# Patient Record
Sex: FEMALE | Race: ASIAN | Employment: FULL TIME | URBAN - METROPOLITAN AREA
[De-identification: names, ages, dates, MRNs, and addresses within clinical notes are randomized per-mention and may not be internally consistent; named-entity substitution may affect disease eponyms.]

---

## 2020-10-13 ENCOUNTER — OFFICE VISIT (OUTPATIENT)
Dept: OBGYN CLINIC | Facility: CLINIC | Age: 35
End: 2020-10-13
Payer: COMMERCIAL

## 2020-10-13 VITALS — WEIGHT: 160 LBS | SYSTOLIC BLOOD PRESSURE: 115 MMHG | DIASTOLIC BLOOD PRESSURE: 82 MMHG | HEART RATE: 90 BPM

## 2020-10-13 DIAGNOSIS — Z32.01 PREGNANCY EXAMINATION OR TEST, POSITIVE RESULT: Primary | ICD-10-CM

## 2020-10-13 DIAGNOSIS — R39.9 UTI SYMPTOMS: ICD-10-CM

## 2020-10-13 DIAGNOSIS — N92.6 MISSED MENSES: ICD-10-CM

## 2020-10-13 PROBLEM — Z86.59 HISTORY OF ANOREXIA NERVOSA: Status: ACTIVE | Noted: 2020-10-13

## 2020-10-13 PROCEDURE — 81025 URINE PREGNANCY TEST: CPT | Performed by: OBSTETRICS & GYNECOLOGY

## 2020-10-13 PROCEDURE — 99202 OFFICE O/P NEW SF 15 MIN: CPT | Performed by: OBSTETRICS & GYNECOLOGY

## 2020-10-13 PROCEDURE — 3074F SYST BP LT 130 MM HG: CPT | Performed by: OBSTETRICS & GYNECOLOGY

## 2020-10-13 PROCEDURE — 3079F DIAST BP 80-89 MM HG: CPT | Performed by: OBSTETRICS & GYNECOLOGY

## 2020-10-13 RX ORDER — NITROFURANTOIN 25; 75 MG/1; MG/1
100 CAPSULE ORAL 2 TIMES DAILY
Qty: 14 CAPSULE | Refills: 0 | Status: SHIPPED | OUTPATIENT
Start: 2020-10-13 | End: 2020-10-20

## 2020-10-13 NOTE — PROGRESS NOTES
[facility], [facility]  Obstetrics and Gynecology  Pregnancy Confirmation  MD CHIRAG Linda     Sukumar Pandey is a 29year old  with Patient's last menstrual period was 2020 (exact date). who presents for pregnancy confirmation.      [employer] Financial resource strain: Not on file      Food insecurity        Worry: Not on file        Inability: Not on file      Transportation needs        Medical: Not on file        Non-medical: Not on file    Tobacco Use      Smoking status: Never Smoker which will be done at RN visit. Pt counseled on AMA and the option of screening versus diagnostic tests. We will discussed this in depth after her ultrasound confirms viability.   Patient counseled on options of empiric treatment for UTI and waiting on re

## 2020-10-20 ENCOUNTER — OFFICE VISIT (OUTPATIENT)
Dept: OBGYN CLINIC | Facility: CLINIC | Age: 35
End: 2020-10-20
Payer: COMMERCIAL

## 2020-10-20 VITALS — HEART RATE: 111 BPM | SYSTOLIC BLOOD PRESSURE: 136 MMHG | DIASTOLIC BLOOD PRESSURE: 86 MMHG

## 2020-10-20 DIAGNOSIS — Z36.87 UNSURE OF LMP (LAST MENSTRUAL PERIOD) AS REASON FOR ULTRASOUND SCAN: Primary | ICD-10-CM

## 2020-10-20 PROCEDURE — 3079F DIAST BP 80-89 MM HG: CPT | Performed by: OBSTETRICS & GYNECOLOGY

## 2020-10-20 PROCEDURE — 99213 OFFICE O/P EST LOW 20 MIN: CPT | Performed by: OBSTETRICS & GYNECOLOGY

## 2020-10-20 PROCEDURE — 76817 TRANSVAGINAL US OBSTETRIC: CPT | Performed by: OBSTETRICS & GYNECOLOGY

## 2020-10-20 PROCEDURE — 3075F SYST BP GE 130 - 139MM HG: CPT | Performed by: OBSTETRICS & GYNECOLOGY

## 2020-10-29 ENCOUNTER — APPOINTMENT (OUTPATIENT)
Dept: GENERAL RADIOLOGY | Age: 35
End: 2020-10-29
Attending: EMERGENCY MEDICINE

## 2020-10-29 ENCOUNTER — HOSPITAL ENCOUNTER (OUTPATIENT)
Age: 35
Setting detail: OBSERVATION
Discharge: HOME | End: 2020-10-30
Attending: EMERGENCY MEDICINE | Admitting: INTERNAL MEDICINE

## 2020-10-29 DIAGNOSIS — R07.9 CHEST PAIN, UNSPECIFIED TYPE: Primary | ICD-10-CM

## 2020-10-29 LAB
ALBUMIN SERPL-MCNC: 3.5 G/DL (ref 3.6–5.1)
ALBUMIN/GLOB SERPL: 0.9 {RATIO} (ref 1–2.4)
ALP SERPL-CCNC: 50 UNITS/L (ref 45–117)
ALT SERPL-CCNC: 24 UNITS/L
ANION GAP SERPL CALC-SCNC: 10 MMOL/L (ref 10–20)
AST SERPL-CCNC: 23 UNITS/L
BASOPHILS # BLD: 0.1 K/MCL (ref 0–0.3)
BASOPHILS NFR BLD: 1 %
BILIRUB SERPL-MCNC: 0.3 MG/DL (ref 0.2–1)
BUN SERPL-MCNC: 14 MG/DL (ref 6–20)
BUN/CREAT SERPL: 23 (ref 7–25)
CALCIUM SERPL-MCNC: 9.2 MG/DL (ref 8.4–10.2)
CHLORIDE SERPL-SCNC: 104 MMOL/L (ref 98–107)
CO2 SERPL-SCNC: 26 MMOL/L (ref 21–32)
CREAT SERPL-MCNC: 0.6 MG/DL (ref 0.51–0.95)
DIFFERENTIAL METHOD BLD: NORMAL
EOSINOPHIL # BLD: 0.2 K/MCL (ref 0.1–0.5)
EOSINOPHIL NFR BLD: 2 %
ERYTHROCYTE [DISTWIDTH] IN BLOOD: 13.1 % (ref 11–15)
GLOBULIN SER-MCNC: 4.1 G/DL (ref 2–4)
GLUCOSE SERPL-MCNC: 108 MG/DL (ref 65–99)
HCG SERPL-ACNC: ABNORMAL MUNITS/ML
HCT VFR BLD CALC: 38.1 % (ref 36–46.5)
HGB BLD-MCNC: 12.5 G/DL (ref 12–15.5)
IMM GRANULOCYTES # BLD AUTO: 0 K/MCL (ref 0–0.2)
IMM GRANULOCYTES NFR BLD: 0 %
LYMPHOCYTES # BLD: 3.3 K/MCL (ref 1–4.8)
LYMPHOCYTES NFR BLD: 31 %
MCH RBC QN AUTO: 29.6 PG (ref 26–34)
MCHC RBC AUTO-ENTMCNC: 32.8 G/DL (ref 32–36.5)
MCV RBC AUTO: 90.3 FL (ref 78–100)
MONOCYTES # BLD: 0.6 K/MCL (ref 0.3–0.9)
MONOCYTES NFR BLD: 6 %
NEUTROPHILS # BLD: 6.6 K/MCL (ref 1.8–7.7)
NEUTROPHILS NFR BLD: 60 %
NRBC BLD MANUAL-RTO: 0 /100 WBC
PLATELET # BLD: 311 K/MCL (ref 140–450)
POTASSIUM SERPL-SCNC: 4 MMOL/L (ref 3.4–5.1)
PROT SERPL-MCNC: 7.6 G/DL (ref 6.4–8.2)
RBC # BLD: 4.22 MIL/MCL (ref 4–5.2)
SODIUM SERPL-SCNC: 136 MMOL/L (ref 135–145)
TROPONIN I SERPL HS-MCNC: <0.02 NG/ML
WBC # BLD: 10.8 K/MCL (ref 4.2–11)

## 2020-10-29 PROCEDURE — 84484 ASSAY OF TROPONIN QUANT: CPT

## 2020-10-29 PROCEDURE — 10004651 HB RX, NO CHARGE ITEM: Performed by: INTERNAL MEDICINE

## 2020-10-29 PROCEDURE — 84702 CHORIONIC GONADOTROPIN TEST: CPT

## 2020-10-29 PROCEDURE — 93005 ELECTROCARDIOGRAM TRACING: CPT | Performed by: EMERGENCY MEDICINE

## 2020-10-29 PROCEDURE — 85379 FIBRIN DEGRADATION QUANT: CPT

## 2020-10-29 PROCEDURE — 99285 EMERGENCY DEPT VISIT HI MDM: CPT

## 2020-10-29 PROCEDURE — 99285 EMERGENCY DEPT VISIT HI MDM: CPT | Performed by: EMERGENCY MEDICINE

## 2020-10-29 PROCEDURE — 96374 THER/PROPH/DIAG INJ IV PUSH: CPT

## 2020-10-29 PROCEDURE — 80053 COMPREHEN METABOLIC PANEL: CPT

## 2020-10-29 PROCEDURE — 85025 COMPLETE CBC W/AUTO DIFF WBC: CPT

## 2020-10-29 PROCEDURE — 71045 X-RAY EXAM CHEST 1 VIEW: CPT

## 2020-10-29 RX ORDER — ACETAMINOPHEN 325 MG/1
650 TABLET ORAL ONCE
Status: COMPLETED | OUTPATIENT
Start: 2020-10-29 | End: 2020-10-29

## 2020-10-29 RX ADMIN — ACETAMINOPHEN 650 MG: 325 TABLET, FILM COATED ORAL at 23:12

## 2020-10-29 ASSESSMENT — PAIN SCALES - GENERAL: PAINLEVEL_OUTOF10: 8

## 2020-10-30 ENCOUNTER — APPOINTMENT (OUTPATIENT)
Dept: CARDIOLOGY | Age: 35
End: 2020-10-30
Attending: INTERNAL MEDICINE

## 2020-10-30 ENCOUNTER — APPOINTMENT (OUTPATIENT)
Dept: CT IMAGING | Age: 35
End: 2020-10-30
Attending: INTERNAL MEDICINE

## 2020-10-30 VITALS
WEIGHT: 165.12 LBS | HEIGHT: 63 IN | BODY MASS INDEX: 29.26 KG/M2 | HEART RATE: 72 BPM | OXYGEN SATURATION: 96 % | DIASTOLIC BLOOD PRESSURE: 61 MMHG | RESPIRATION RATE: 16 BRPM | SYSTOLIC BLOOD PRESSURE: 99 MMHG | TEMPERATURE: 98.2 F

## 2020-10-30 PROBLEM — R07.89 MUSCULOSKELETAL CHEST PAIN: Status: ACTIVE | Noted: 2020-10-30

## 2020-10-30 PROBLEM — Z3A.08 8 WEEKS GESTATION OF PREGNANCY: Status: ACTIVE | Noted: 2020-10-30

## 2020-10-30 PROBLEM — R79.89 ELEVATED D-DIMER: Status: ACTIVE | Noted: 2020-10-30

## 2020-10-30 LAB
ATRIAL RATE (BPM): 63
ATRIAL RATE (BPM): 69
ATRIAL RATE (BPM): 99
CHOLEST SERPL-MCNC: 198 MG/DL
CHOLEST/HDLC SERPL: 3 {RATIO}
CRP SERPL-MCNC: 0.4 MG/DL
D DIMER PPP FEU-MCNC: 0.99 MG/L (FEU)
ERYTHROCYTE [SEDIMENTATION RATE] IN BLOOD BY WESTERGREN METHOD: 8 MM/HR (ref 0–20)
HDLC SERPL-MCNC: 66 MG/DL
LDLC SERPL CALC-MCNC: 104 MG/DL
NONHDLC SERPL-MCNC: 132 MG/DL
P AXIS (DEGREES): 14
P AXIS (DEGREES): 46
P AXIS (DEGREES): 49
PR-INTERVAL (MSEC): 161
PR-INTERVAL (MSEC): 165
PR-INTERVAL (MSEC): 171
QRS-INTERVAL (MSEC): 75
QRS-INTERVAL (MSEC): 79
QRS-INTERVAL (MSEC): 83
QT-INTERVAL (MSEC): 328
QT-INTERVAL (MSEC): 395
QT-INTERVAL (MSEC): 395
QTC: 408
QTC: 419
QTC: 423
R AXIS (DEGREES): 14
R AXIS (DEGREES): 27
R AXIS (DEGREES): 44
REPORT TEXT: NORMAL
SARS-COV-2 RNA RESP QL NAA+PROBE: NOT DETECTED
SERVICE CMNT-IMP: NORMAL
SPECIMEN SOURCE: NORMAL
T AXIS (DEGREES): 18
T AXIS (DEGREES): 18
T AXIS (DEGREES): 47
TRIGL SERPL-MCNC: 140 MG/DL
TROPONIN I SERPL HS-MCNC: <0.02 NG/ML
VENTRICULAR RATE EKG/MIN (BPM): 64
VENTRICULAR RATE EKG/MIN (BPM): 69
VENTRICULAR RATE EKG/MIN (BPM): 98

## 2020-10-30 PROCEDURE — 71275 CT ANGIOGRAPHY CHEST: CPT

## 2020-10-30 PROCEDURE — G0378 HOSPITAL OBSERVATION PER HR: HCPCS

## 2020-10-30 PROCEDURE — 10004651 HB RX, NO CHARGE ITEM: Performed by: NURSE PRACTITIONER

## 2020-10-30 PROCEDURE — 93306 TTE W/DOPPLER COMPLETE: CPT

## 2020-10-30 PROCEDURE — 86140 C-REACTIVE PROTEIN: CPT

## 2020-10-30 PROCEDURE — 84484 ASSAY OF TROPONIN QUANT: CPT

## 2020-10-30 PROCEDURE — 36415 COLL VENOUS BLD VENIPUNCTURE: CPT

## 2020-10-30 PROCEDURE — 87635 SARS-COV-2 COVID-19 AMP PRB: CPT

## 2020-10-30 PROCEDURE — 10002800 HB RX 250 W HCPCS: Performed by: INTERNAL MEDICINE

## 2020-10-30 PROCEDURE — 10002807 HB RX 258: Performed by: INTERNAL MEDICINE

## 2020-10-30 PROCEDURE — 93005 ELECTROCARDIOGRAM TRACING: CPT | Performed by: NURSE PRACTITIONER

## 2020-10-30 PROCEDURE — 93005 ELECTROCARDIOGRAM TRACING: CPT | Performed by: INTERNAL MEDICINE

## 2020-10-30 PROCEDURE — 85652 RBC SED RATE AUTOMATED: CPT

## 2020-10-30 PROCEDURE — 96361 HYDRATE IV INFUSION ADD-ON: CPT

## 2020-10-30 PROCEDURE — 10004651 HB RX, NO CHARGE ITEM: Performed by: INTERNAL MEDICINE

## 2020-10-30 PROCEDURE — 10002805 HB CONTRAST AGENT: Performed by: INTERNAL MEDICINE

## 2020-10-30 PROCEDURE — 10002800 HB RX 250 W HCPCS: Performed by: NURSE PRACTITIONER

## 2020-10-30 PROCEDURE — 96376 TX/PRO/DX INJ SAME DRUG ADON: CPT

## 2020-10-30 PROCEDURE — U0003 INFECTIOUS AGENT DETECTION BY NUCLEIC ACID (DNA OR RNA); SEVERE ACUTE RESPIRATORY SYNDROME CORONAVIRUS 2 (SARS-COV-2) (CORONAVIRUS DISEASE [COVID-19]), AMPLIFIED PROBE TECHNIQUE, MAKING USE OF HIGH THROUGHPUT TECHNOLOGIES AS DESCRIBED BY CMS-2020-01-R: HCPCS

## 2020-10-30 PROCEDURE — 80061 LIPID PANEL: CPT

## 2020-10-30 PROCEDURE — 99244 OFF/OP CNSLTJ NEW/EST MOD 40: CPT | Performed by: INTERNAL MEDICINE

## 2020-10-30 PROCEDURE — 10002803 HB RX 637: Performed by: INTERNAL MEDICINE

## 2020-10-30 RX ORDER — SIMETHICONE 125 MG
125 TABLET,CHEWABLE ORAL 4 TIMES DAILY PRN
Status: DISCONTINUED | OUTPATIENT
Start: 2020-10-30 | End: 2020-10-30 | Stop reason: HOSPADM

## 2020-10-30 RX ORDER — ACETAMINOPHEN 325 MG/1
650 TABLET ORAL EVERY 4 HOURS PRN
Qty: 24 TABLET | Refills: 0 | Status: SHIPPED | COMMUNITY
Start: 2020-10-30

## 2020-10-30 RX ORDER — ONDANSETRON 2 MG/ML
4 INJECTION INTRAMUSCULAR; INTRAVENOUS EVERY 6 HOURS PRN
Status: DISCONTINUED | OUTPATIENT
Start: 2020-10-30 | End: 2020-10-30 | Stop reason: HOSPADM

## 2020-10-30 RX ORDER — LIDOCAINE 4 G/G
1 PATCH TOPICAL ONCE
Status: DISCONTINUED | OUTPATIENT
Start: 2020-10-30 | End: 2020-10-30 | Stop reason: HOSPADM

## 2020-10-30 RX ORDER — ACETAMINOPHEN 325 MG/1
975 TABLET ORAL ONCE
Status: COMPLETED | OUTPATIENT
Start: 2020-10-30 | End: 2020-10-30

## 2020-10-30 RX ORDER — ACETAMINOPHEN 325 MG/1
650 TABLET ORAL EVERY 4 HOURS PRN
Status: DISCONTINUED | OUTPATIENT
Start: 2020-10-30 | End: 2020-10-30 | Stop reason: HOSPADM

## 2020-10-30 RX ORDER — LIDOCAINE 4 G/G
1 PATCH TOPICAL ONCE
Status: COMPLETED | OUTPATIENT
Start: 2020-10-30 | End: 2020-10-30

## 2020-10-30 RX ORDER — VITAMIN A, VITAMIN C, VITAMIN D-3, VITAMIN E, VITAMIN B-1, VITAMIN B-2, NIACIN, VITAMIN B-6, CALCIUM, IRON, ZINC, COPPER 4000; 120; 400; 22; 1.84; 3; 20; 10; 1; 12; 200; 27; 25; 2 [IU]/1; MG/1; [IU]/1; MG/1; MG/1; MG/1; MG/1; MG/1; MG/1; UG/1; MG/1; MG/1; MG/1; MG/1
1 TABLET ORAL DAILY
Status: DISCONTINUED | OUTPATIENT
Start: 2020-10-30 | End: 2020-10-30 | Stop reason: HOSPADM

## 2020-10-30 RX ORDER — 0.9 % SODIUM CHLORIDE 0.9 %
2 VIAL (ML) INJECTION EVERY 12 HOURS SCHEDULED
Status: DISCONTINUED | OUTPATIENT
Start: 2020-10-30 | End: 2020-10-30 | Stop reason: HOSPADM

## 2020-10-30 RX ORDER — VITAMIN A ACETATE, BETA CAROTENE, ASCORBIC ACID, CHOLECALCIFEROL, .ALPHA.-TOCOPHEROL ACETATE, DL-, THIAMINE MONONITRATE, RIBOFLAVIN, NIACINAMIDE, PYRIDOXINE HYDROCHLORIDE, FOLIC ACID, CYANOCOBALAMIN, CALCIUM CARBONATE, FERROUS FUMARATE, ZINC OXIDE, CUPRIC OXIDE 3080; 12; 120; 400; 1; 1.84; 3; 20; 22; 920; 25; 200; 27; 10; 2 [IU]/1; UG/1; MG/1; [IU]/1; MG/1; MG/1; MG/1; MG/1; MG/1; [IU]/1; MG/1; MG/1; MG/1; MG/1; MG/1
1 TABLET, FILM COATED ORAL DAILY
COMMUNITY

## 2020-10-30 RX ADMIN — IOHEXOL 80 ML: 350 INJECTION, SOLUTION INTRAVENOUS at 01:30

## 2020-10-30 RX ADMIN — ACETAMINOPHEN 975 MG: 325 TABLET, FILM COATED ORAL at 01:14

## 2020-10-30 RX ADMIN — ACETAMINOPHEN 650 MG: 325 TABLET, FILM COATED ORAL at 16:32

## 2020-10-30 RX ADMIN — SODIUM CHLORIDE, PRESERVATIVE FREE 2 ML: 5 INJECTION INTRAVENOUS at 09:07

## 2020-10-30 RX ADMIN — MORPHINE SULFATE 2 MG: 2 INJECTION, SOLUTION INTRAMUSCULAR; INTRAVENOUS at 01:56

## 2020-10-30 RX ADMIN — ACETAMINOPHEN 650 MG: 325 TABLET, FILM COATED ORAL at 06:41

## 2020-10-30 RX ADMIN — MORPHINE SULFATE 2 MG: 2 INJECTION, SOLUTION INTRAMUSCULAR; INTRAVENOUS at 09:13

## 2020-10-30 RX ADMIN — SODIUM CHLORIDE 1000 ML: 0.9 INJECTION, SOLUTION INTRAVENOUS at 10:31

## 2020-10-30 RX ADMIN — LIDOCAINE 1 PATCH: 560 PATCH PERCUTANEOUS; TOPICAL; TRANSDERMAL at 01:14

## 2020-10-30 ASSESSMENT — COLUMBIA-SUICIDE SEVERITY RATING SCALE - C-SSRS
2. HAVE YOU ACTUALLY HAD ANY THOUGHTS OF KILLING YOURSELF?: NO
IS THE PATIENT ABLE TO COMPLETE C-SSRS: YES
6. HAVE YOU EVER DONE ANYTHING, STARTED TO DO ANYTHING, OR PREPARED TO DO ANYTHING TO END YOUR LIFE?: NO
1. WITHIN THE PAST MONTH, HAVE YOU WISHED YOU WERE DEAD OR WISHED YOU COULD GO TO SLEEP AND NOT WAKE UP?: NO

## 2020-10-30 ASSESSMENT — PATIENT HEALTH QUESTIONNAIRE - PHQ9
SUM OF ALL RESPONSES TO PHQ9 QUESTIONS 1 AND 2: 0
IS PATIENT ABLE TO COMPLETE PHQ2 OR PHQ9: YES
SUM OF ALL RESPONSES TO PHQ9 QUESTIONS 1 AND 2: 0
2. FEELING DOWN, DEPRESSED OR HOPELESS: NOT AT ALL
1. LITTLE INTEREST OR PLEASURE IN DOING THINGS: NOT AT ALL
CLINICAL INTERPRETATION OF PHQ2 SCORE: NO FURTHER SCREENING NEEDED
CLINICAL INTERPRETATION OF PHQ9 SCORE: NO FURTHER SCREENING NEEDED

## 2020-10-30 ASSESSMENT — ENCOUNTER SYMPTOMS
BACK PAIN: 0
ENDOCRINE NEGATIVE: 1
NAUSEA: 0
WEAKNESS: 0
LIGHT-HEADEDNESS: 0
NUMBNESS: 0
ALLERGIC/IMMUNOLOGIC NEGATIVE: 1
FATIGUE: 0
CONSTIPATION: 0
HEMATOLOGIC/LYMPHATIC NEGATIVE: 1
CONSTITUTIONAL NEGATIVE: 1
HEADACHES: 0
EYE PAIN: 0
EYES NEGATIVE: 1
NEAR-SYNCOPE: 0
FEVER: 0
SHORTNESS OF BREATH: 0
ORTHOPNEA: 0
NEUROLOGICAL NEGATIVE: 1
SYNCOPE: 0
RESPIRATORY NEGATIVE: 1
RHINORRHEA: 0
CHILLS: 0
PSYCHIATRIC NEGATIVE: 1
COUGH: 0
VOMITING: 0
DIZZINESS: 0
DIARRHEA: 0
ABDOMINAL PAIN: 0
SORE THROAT: 0
GASTROINTESTINAL NEGATIVE: 1

## 2020-10-30 ASSESSMENT — ACTIVITIES OF DAILY LIVING (ADL)
CHRONIC_PAIN_PRESENT: NO
ADL_SHORT_OF_BREATH: YES
ADL_SCORE: 12
ADL_BEFORE_ADMISSION: INDEPENDENT
RECENT_DECLINE_ADL: NO

## 2020-10-30 ASSESSMENT — LIFESTYLE VARIABLES
AUDIT-C TOTAL SCORE: 0
HOW MANY STANDARD DRINKS CONTAINING ALCOHOL DO YOU HAVE ON A TYPICAL DAY: 0,1 OR 2
HOW OFTEN DO YOU HAVE A DRINK CONTAINING ALCOHOL: NEVER
HOW OFTEN DO YOU HAVE 6 OR MORE DRINKS ON ONE OCCASION: NEVER

## 2020-10-30 ASSESSMENT — PAIN SCALES - GENERAL
PAINLEVEL_OUTOF10: 6
PAINLEVEL_OUTOF10: 4
PAINLEVEL_OUTOF10: 8
PAINLEVEL_OUTOF10: 8
PAINLEVEL_OUTOF10: 9
PAINLEVEL_OUTOF10: 3
PAINLEVEL_OUTOF10: 4

## 2020-10-30 ASSESSMENT — COGNITIVE AND FUNCTIONAL STATUS - GENERAL
ARE YOU DEAF OR DO YOU HAVE SERIOUS DIFFICULTY  HEARING: NO
ARE YOU BLIND OR DO YOU HAVE SERIOUS DIFFICULTY SEEING, EVEN WHEN WEARING GLASSES: NO

## 2020-10-30 ASSESSMENT — PAIN DESCRIPTION - PAIN TYPE: TYPE: CHEST PAIN

## 2020-11-03 ENCOUNTER — OFFICE VISIT (OUTPATIENT)
Dept: OBGYN CLINIC | Facility: CLINIC | Age: 35
End: 2020-11-03
Payer: COMMERCIAL

## 2020-11-03 VITALS — DIASTOLIC BLOOD PRESSURE: 74 MMHG | SYSTOLIC BLOOD PRESSURE: 118 MMHG | WEIGHT: 164 LBS

## 2020-11-03 DIAGNOSIS — Z36.87 UNSURE OF LMP (LAST MENSTRUAL PERIOD) AS REASON FOR ULTRASOUND SCAN: Primary | ICD-10-CM

## 2020-11-03 DIAGNOSIS — F32.A DEPRESSION, UNSPECIFIED DEPRESSION TYPE: ICD-10-CM

## 2020-11-03 PROCEDURE — 99072 ADDL SUPL MATRL&STAF TM PHE: CPT | Performed by: OBSTETRICS & GYNECOLOGY

## 2020-11-03 PROCEDURE — 76817 TRANSVAGINAL US OBSTETRIC: CPT | Performed by: OBSTETRICS & GYNECOLOGY

## 2020-11-03 PROCEDURE — 99214 OFFICE O/P EST MOD 30 MIN: CPT | Performed by: OBSTETRICS & GYNECOLOGY

## 2020-11-03 PROCEDURE — 3078F DIAST BP <80 MM HG: CPT | Performed by: OBSTETRICS & GYNECOLOGY

## 2020-11-03 PROCEDURE — 3074F SYST BP LT 130 MM HG: CPT | Performed by: OBSTETRICS & GYNECOLOGY

## 2020-11-04 NOTE — PROGRESS NOTES
[facility], [facility]  Obstetrics and Gynecology  Ultrasound Visit  MD CHIRAG Barrow Ivroman Bañuelos is a 29year old  with Patient's last menstrual period was 2020 (exact date).  who presents for ultrasound to confirm viability and da History   Social History    Socioeconomic History      Marital status: Single      Spouse name: Not on file      Number of children: Not on file      Years of education: Not on file      Highest education level: Not on file    Occupational History      Not ovaries bilateral.  Normal cervical length. Bennie Bailey is a 29year old female  with viable IUP at 7-0/7 weeks by ultrasound not consistent with LMP. We will plan on using ultrasound WALLY. Advanced maternal age pregnancy.   Depression in pre

## 2020-11-11 ENCOUNTER — NURSE ONLY (OUTPATIENT)
Dept: OBGYN CLINIC | Facility: CLINIC | Age: 35
End: 2020-11-11

## 2020-11-11 VITALS — HEIGHT: 63 IN | BODY MASS INDEX: 29 KG/M2

## 2020-11-11 DIAGNOSIS — O09.521 MULTIGRAVIDA OF ADVANCED MATERNAL AGE IN FIRST TRIMESTER: ICD-10-CM

## 2020-11-11 DIAGNOSIS — Z34.81 ENCOUNTER FOR SUPERVISION OF OTHER NORMAL PREGNANCY IN FIRST TRIMESTER: Primary | ICD-10-CM

## 2020-11-11 DIAGNOSIS — O99.340 DEPRESSION AFFECTING PREGNANCY: ICD-10-CM

## 2020-11-11 DIAGNOSIS — F32.A DEPRESSION AFFECTING PREGNANCY: ICD-10-CM

## 2020-11-11 RX ORDER — CHOLECALCIFEROL (VITAMIN D3) 25 MCG
1 TABLET,CHEWABLE ORAL DAILY
COMMUNITY
End: 2020-11-16

## 2020-11-11 NOTE — PROGRESS NOTES
Pt is a with EDC of 2021 based on in office U/S on 11/3/20 . Med Hx-depression/anxiety/eating disorder    OB Hx-Primary  section  for pre-eclampsia and oligohydramnios    Telephone OB RN Education visit.  Education materials revie

## 2020-11-13 ENCOUNTER — TELEPHONE (OUTPATIENT)
Dept: OBGYN CLINIC | Facility: CLINIC | Age: 35
End: 2020-11-13

## 2020-11-13 NOTE — TELEPHONE ENCOUNTER
ROSENDA Tavares,     On 11/12, the following referrals for therapy were provided to the patient:     Paulo Marshall, Therapist, [facility]   Berta Mooney, Therapist, [address]

## 2020-11-16 PROBLEM — R79.89 ELEVATED D-DIMER: Status: ACTIVE | Noted: 2020-10-30

## 2020-11-16 PROBLEM — F32.9 MAJOR DEPRESSIVE DISORDER: Status: ACTIVE | Noted: 2019-08-05

## 2020-11-16 PROBLEM — F33.9 EPISODE OF RECURRENT MAJOR DEPRESSIVE DISORDER (HCC): Status: ACTIVE | Noted: 2019-08-05

## 2020-11-16 PROBLEM — R45.89 THOUGHTS OF SELF HARM: Status: ACTIVE | Noted: 2020-11-16

## 2020-11-16 PROBLEM — R45.89 THOUGHTS OF SELF HARM: Chronic | Status: ACTIVE | Noted: 2020-11-16

## 2020-11-16 PROBLEM — F43.12 CHRONIC POST-TRAUMATIC STRESS DISORDER (PTSD): Status: ACTIVE | Noted: 2020-11-16

## 2020-11-16 PROBLEM — F33.9 EPISODE OF RECURRENT MAJOR DEPRESSIVE DISORDER: Status: ACTIVE | Noted: 2019-08-05

## 2020-11-16 PROBLEM — R07.89 MUSCULOSKELETAL CHEST PAIN: Status: ACTIVE | Noted: 2020-10-30

## 2020-11-16 PROBLEM — F90.0 ATTENTION DEFICIT HYPERACTIVITY DISORDER (ADHD), PREDOMINANTLY INATTENTIVE TYPE: Status: ACTIVE | Noted: 2019-08-05

## 2020-11-20 ENCOUNTER — LAB ENCOUNTER (OUTPATIENT)
Dept: LAB | Age: 35
End: 2020-11-20
Attending: OBSTETRICS & GYNECOLOGY
Payer: COMMERCIAL

## 2020-11-20 DIAGNOSIS — Z34.81 ENCOUNTER FOR SUPERVISION OF OTHER NORMAL PREGNANCY IN FIRST TRIMESTER: ICD-10-CM

## 2020-11-20 PROCEDURE — 86850 RBC ANTIBODY SCREEN: CPT

## 2020-11-20 PROCEDURE — 85025 COMPLETE CBC W/AUTO DIFF WBC: CPT

## 2020-11-20 PROCEDURE — 86780 TREPONEMA PALLIDUM: CPT

## 2020-11-20 PROCEDURE — 86900 BLOOD TYPING SEROLOGIC ABO: CPT

## 2020-11-20 PROCEDURE — 86803 HEPATITIS C AB TEST: CPT

## 2020-11-20 PROCEDURE — 36415 COLL VENOUS BLD VENIPUNCTURE: CPT

## 2020-11-20 PROCEDURE — 87389 HIV-1 AG W/HIV-1&-2 AB AG IA: CPT

## 2020-11-20 PROCEDURE — 80053 COMPREHEN METABOLIC PANEL: CPT

## 2020-11-20 PROCEDURE — 87340 HEPATITIS B SURFACE AG IA: CPT

## 2020-11-20 PROCEDURE — 86901 BLOOD TYPING SEROLOGIC RH(D): CPT

## 2020-12-08 ENCOUNTER — INITIAL PRENATAL (OUTPATIENT)
Dept: OBGYN CLINIC | Facility: CLINIC | Age: 35
End: 2020-12-08
Payer: COMMERCIAL

## 2020-12-08 ENCOUNTER — TELEPHONE (OUTPATIENT)
Dept: OBGYN CLINIC | Facility: CLINIC | Age: 35
End: 2020-12-08

## 2020-12-08 VITALS
SYSTOLIC BLOOD PRESSURE: 120 MMHG | WEIGHT: 163.81 LBS | HEART RATE: 108 BPM | DIASTOLIC BLOOD PRESSURE: 79 MMHG | BODY MASS INDEX: 29 KG/M2

## 2020-12-08 DIAGNOSIS — O09.529 SUPERVISION OF ELDERLY MULTIGRAVIDA, UNSPECIFIED TRIMESTER: Primary | ICD-10-CM

## 2020-12-08 DIAGNOSIS — Z34.82 ENCOUNTER FOR SUPERVISION OF OTHER NORMAL PREGNANCY IN SECOND TRIMESTER: ICD-10-CM

## 2020-12-08 PROCEDURE — 3078F DIAST BP <80 MM HG: CPT | Performed by: OBSTETRICS & GYNECOLOGY

## 2020-12-08 PROCEDURE — 3074F SYST BP LT 130 MM HG: CPT | Performed by: OBSTETRICS & GYNECOLOGY

## 2020-12-08 PROCEDURE — 90686 IIV4 VACC NO PRSV 0.5 ML IM: CPT | Performed by: OBSTETRICS & GYNECOLOGY

## 2020-12-08 PROCEDURE — 90471 IMMUNIZATION ADMIN: CPT | Performed by: OBSTETRICS & GYNECOLOGY

## 2020-12-09 NOTE — TELEPHONE ENCOUNTER
12/09/20 [facility], for pt to call back. Panorama kit is pending for pt for . Double check with patient if she would like to know the sex of the baby.

## 2020-12-09 NOTE — TELEPHONE ENCOUNTER
Pt informed req updated per request. Asked pt to send a my chart message when she was here so nurse can receive her at the entrance.

## 2020-12-09 NOTE — TELEPHONE ENCOUNTER
Patient is supposed to have a cell free DNA test with panorama at this time in her pregnancy. Please assist patient in having this ordered. The indication is advanced maternal age.

## 2020-12-09 NOTE — PROGRESS NOTES
[facility], [facility]  Obstetrics and Gynecology  Prenatal Visit  Hector Holt MD    CHIRAG Duff is a 28year old.o.   female with Patient's last menstrual period was 2020 (exact date).   and with an estimated date of delivery of:  Value Reference Range Date Time    HCT        HGB        Platelets        GTT 1 Hr        Glucose Fasting        Glucose 1 Hr        Glucose 2 Hr        Glucose 3 Hr        TSH         Profile        Urine Culture        GC DNA        Chlamydia DN Allergies  Medications     Current Outpatient Medications   Medication Sig Dispense Refill   • buPROPion HCl ER, XL, 300 MG Oral Tablet 24 Hr Take 1 tablet (300 mg total) by mouth daily. • Doxylamine Succinate, Sleep, 25 MG Oral Tab Take by mouth. Not on file    Social History Narrative      Not on file    Exam   /79   Pulse 108   Wt 163 lb 12.8 oz (74.3 kg)   LMP 09/01/2020 (Exact Date)   BMI 29.02 kg/m²   GENERAL: well developed, well nourished, in no apparent distress  SKIN: no rashes, no household contacts or regular caregivers. Flu vaccine offered and was given. Patient follow-up for prenatal visit in 4 weeks.       Daisy Khan MD, MD  7:29 PM  12/8/2020

## 2020-12-09 NOTE — TELEPHONE ENCOUNTER
Patient is calling to make her selection for testing. She would like Advanced panorama panel & H4 tests. Please call her when and where paperwork is ready for signatures. She can be at the offices at 5:30 or later today (12/9) or tomorrow (12/10).

## 2020-12-09 NOTE — TELEPHONE ENCOUNTER
Pt stated she would like to know the gender. Per pt would also like to consider doing the extended panel and carrier screening panel. Pt given codes and Panorama number to call regarding cost of tests.  Per pt will call back once she has made decision about

## 2020-12-14 ENCOUNTER — TELEPHONE (OUTPATIENT)
Dept: OBGYN CLINIC | Facility: CLINIC | Age: 35
End: 2020-12-14

## 2020-12-14 ENCOUNTER — HOSPITAL ENCOUNTER (EMERGENCY)
Facility: HOSPITAL | Age: 35
Discharge: HOME | End: 2020-12-14
Attending: PHYSICIAN ASSISTANT
Payer: COMMERCIAL

## 2020-12-14 ENCOUNTER — APPOINTMENT (OUTPATIENT)
Dept: ULTRASOUND IMAGING | Facility: HOSPITAL | Age: 35
End: 2020-12-14
Attending: PHYSICIAN ASSISTANT
Payer: COMMERCIAL

## 2020-12-14 VITALS
HEART RATE: 84 BPM | RESPIRATION RATE: 20 BRPM | HEIGHT: 63 IN | OXYGEN SATURATION: 97 % | DIASTOLIC BLOOD PRESSURE: 66 MMHG | TEMPERATURE: 98 F | BODY MASS INDEX: 29 KG/M2 | SYSTOLIC BLOOD PRESSURE: 110 MMHG

## 2020-12-14 DIAGNOSIS — M79.661 RIGHT CALF PAIN: Primary | ICD-10-CM

## 2020-12-14 PROCEDURE — 99284 EMERGENCY DEPT VISIT MOD MDM: CPT

## 2020-12-14 PROCEDURE — 93971 EXTREMITY STUDY: CPT | Performed by: PHYSICIAN ASSISTANT

## 2020-12-14 NOTE — ED PROVIDER NOTES
Patient Seen in: [facility] AND [facility]    History   Patient presents with:  Leg Pain    Stated Complaint: R leg pain    HPI      22-year-old female, 13 weeks pregnant presents with chief complaint of posterior right lower leg pain.   Onse All other systems reviewed and negative except as noted above. PSFH elements reviewed from today and agreed except as otherwise stated in HPI.     Physical Exam     ED Triage Vitals [12/14/20 1346]   /68   Pulse 108   Resp 20   Temp 98.2 °F (36 musculoskeletal system is grossly intact. There is no obvious deformity. Neurological: Gross motor movement is intact in all 4 extremities. Patient exhibits normal speech. Skin: Skin is normal to inspection. Warm and dry. No obvious bruising.   No obv

## 2020-12-14 NOTE — ED NOTES
Pt here for right lower leg pain. OB sent for DVT workup. Pt last appt was approx 1 week ago, is taking prenatals and has prenatal care.

## 2020-12-14 NOTE — TELEPHONE ENCOUNTER
Patient name and  verified. Patient informed of MLM recommendations. ER physician recommends scheduling follow up visit for tingling and numbness in right leg . Follow up appointment made for 2020 with ALEKSANDERF.  Patient verbalized understanding,

## 2020-12-14 NOTE — TELEPHONE ENCOUNTER
Pt Name and  verified. Pt states that she has never had a blood clot before. Pt states that she has numbness, pain in one spot of R. calf. Pt states that the pain has increased in intensity since yesterday. Pt is currently 12w.  Pt does feel some heat

## 2020-12-14 NOTE — ED INITIAL ASSESSMENT (HPI)
Pt is 13 weeks pregnant complaining of right lower leg pain since yesterday. Pt sent here by her OB doctor's office for rule out DVT. Pt denies shortness of breath.

## 2020-12-14 NOTE — TELEPHONE ENCOUNTER
Pt went to ER, they admitted her, took ultrasound, didn't show any blood clots, nurse at ER wants to know if she should run anymore test due to the numbness and pain in legs

## 2020-12-14 NOTE — TELEPHONE ENCOUNTER
Patient wanted a nurse to call her back. Patient states she thinks she has a blood clot in her right calf and wants to know if she should go to urgent care of if she can do something to help the issue at home.

## 2020-12-14 NOTE — TELEPHONE ENCOUNTER
Patient called to inform us that she will be in around noon today to  all of the genetic testing material.

## 2020-12-14 NOTE — TELEPHONE ENCOUNTER
No further tests need to be done. If the doppler studies show no blood clot then I would say the pain she is experiencing is a muscle cramp that can be painful in pregnancy.   I recommend that he patient take a Calcium supplement daily and she can increase

## 2020-12-29 ENCOUNTER — TELEPHONE (OUTPATIENT)
Dept: OBGYN CLINIC | Facility: CLINIC | Age: 35
End: 2020-12-29

## 2020-12-29 NOTE — TELEPHONE ENCOUNTER
I reviewed results of Panorama genetic screen results. Please inform patient that all screens were reassuring and low risk- Trisomies, 21,18, 13, Monosomy X, Triploidy, and several microdeletions. If she wishes to know the sex, it is a GIRL!

## 2020-12-29 NOTE — TELEPHONE ENCOUNTER
Incoming fax received from [place]. Report left on [facility] desk to review as JJF not in office.

## 2021-01-05 ENCOUNTER — ROUTINE PRENATAL (OUTPATIENT)
Dept: OBGYN CLINIC | Facility: CLINIC | Age: 36
End: 2021-01-05
Payer: COMMERCIAL

## 2021-01-05 VITALS — DIASTOLIC BLOOD PRESSURE: 76 MMHG | SYSTOLIC BLOOD PRESSURE: 110 MMHG | WEIGHT: 167.69 LBS | BODY MASS INDEX: 30 KG/M2

## 2021-01-05 DIAGNOSIS — O09.522 MULTIGRAVIDA OF ADVANCED MATERNAL AGE IN SECOND TRIMESTER: ICD-10-CM

## 2021-01-05 DIAGNOSIS — O34.219 PREVIOUS CESAREAN DELIVERY, ANTEPARTUM CONDITION OR COMPLICATION: ICD-10-CM

## 2021-01-05 DIAGNOSIS — Z34.82 ENCOUNTER FOR SUPERVISION OF OTHER NORMAL PREGNANCY IN SECOND TRIMESTER: Primary | ICD-10-CM

## 2021-01-05 PROCEDURE — 81002 URINALYSIS NONAUTO W/O SCOPE: CPT | Performed by: OBSTETRICS & GYNECOLOGY

## 2021-01-05 PROCEDURE — 3074F SYST BP LT 130 MM HG: CPT | Performed by: OBSTETRICS & GYNECOLOGY

## 2021-01-05 PROCEDURE — 3078F DIAST BP <80 MM HG: CPT | Performed by: OBSTETRICS & GYNECOLOGY

## 2021-01-05 NOTE — PROGRESS NOTES
[facility], [facility]  Obstetrics and Gynecology  Prenatal Visit  Meghana Ocampo MD    CHIRAG Salinas is a 28year old.o.  16w0d weeks. Here for routine prenatal visit and is without complaints.   Patient denies any regular uterine contractions, patient's facial rash. MFM ultrasound ordered for 4 weeks. Pt counseled on option of MSAFP to screen for NTD such as spina bifida.   I discussed that with an elevated MSAFP the initial follow up would be an US to confirm dating (which she has already had)

## 2021-01-20 ENCOUNTER — ROUTINE PRENATAL (OUTPATIENT)
Dept: OBGYN CLINIC | Facility: CLINIC | Age: 36
End: 2021-01-20
Payer: COMMERCIAL

## 2021-01-20 ENCOUNTER — TELEPHONE (OUTPATIENT)
Dept: OBGYN CLINIC | Facility: CLINIC | Age: 36
End: 2021-01-20

## 2021-01-20 VITALS — DIASTOLIC BLOOD PRESSURE: 82 MMHG | SYSTOLIC BLOOD PRESSURE: 126 MMHG

## 2021-01-20 DIAGNOSIS — Z34.82 ENCOUNTER FOR SUPERVISION OF OTHER NORMAL PREGNANCY IN SECOND TRIMESTER: Primary | ICD-10-CM

## 2021-01-20 LAB
APPEARANCE: CLEAR
MULTISTIX LOT#: 1044 NUMERIC
PH, URINE: 6.5 (ref 4.5–8)
SPECIFIC GRAVITY: 1.01 (ref 1–1.03)
URINE-COLOR: YELLOW
UROBILINOGEN,SEMI-QN: 0.2 MG/DL (ref 0–1.9)

## 2021-01-20 PROCEDURE — 3079F DIAST BP 80-89 MM HG: CPT | Performed by: OBSTETRICS & GYNECOLOGY

## 2021-01-20 PROCEDURE — 3074F SYST BP LT 130 MM HG: CPT | Performed by: OBSTETRICS & GYNECOLOGY

## 2021-01-20 NOTE — TELEPHONE ENCOUNTER
For the past couple of days, pt has had a stuffy nose and dry cough. No fever, extreme fatigue, or loss of taste or smell. Pt voices since she has been sick, she has not felt her baby move. See pt's [place] message.  Scheduled pt to see mlm today at 4:00 pm

## 2021-01-20 NOTE — PROGRESS NOTES
Problem Visit:  Patient has not felt movement in 3 days. + FHTs on exam.  Patient has OB ultrasound scheduled in 2 weeks. Encouraged patient to schedule Colposcopy next week. Keep regular appointment.

## 2021-01-20 NOTE — TELEPHONE ENCOUNTER
----- Message from Di Torres sent at 1/20/2021  8:16 AM CST -----  Regarding: RE: Non-Urgent Medical Question  Contact: [phone]  Hello,    Since I've had a cold, I haven't felt my baby move. It's been 2 days now.  I started to feel her move at

## 2021-02-08 ENCOUNTER — HOSPITAL ENCOUNTER (OUTPATIENT)
Dept: PERINATAL CARE | Facility: HOSPITAL | Age: 36
Discharge: HOME | End: 2021-02-08
Attending: OBSTETRICS & GYNECOLOGY
Payer: COMMERCIAL

## 2021-02-08 VITALS
WEIGHT: 177 LBS | DIASTOLIC BLOOD PRESSURE: 72 MMHG | SYSTOLIC BLOOD PRESSURE: 115 MMHG | HEART RATE: 105 BPM | BODY MASS INDEX: 31 KG/M2

## 2021-02-08 DIAGNOSIS — O34.219 PREVIOUS CESAREAN DELIVERY, ANTEPARTUM CONDITION OR COMPLICATION: ICD-10-CM

## 2021-02-08 DIAGNOSIS — O09.522 MULTIGRAVIDA OF ADVANCED MATERNAL AGE IN SECOND TRIMESTER: ICD-10-CM

## 2021-02-08 DIAGNOSIS — O09.529 SUPERVISION OF ELDERLY MULTIGRAVIDA, UNSPECIFIED TRIMESTER: ICD-10-CM

## 2021-02-08 DIAGNOSIS — F33.2 SEVERE EPISODE OF RECURRENT MAJOR DEPRESSIVE DISORDER, WITHOUT PSYCHOTIC FEATURES (HCC): ICD-10-CM

## 2021-02-08 DIAGNOSIS — O09.529 SUPERVISION OF ELDERLY MULTIGRAVIDA, UNSPECIFIED TRIMESTER: Primary | ICD-10-CM

## 2021-02-08 PROCEDURE — 76811 OB US DETAILED SNGL FETUS: CPT | Performed by: OBSTETRICS & GYNECOLOGY

## 2021-02-08 PROCEDURE — 99243 OFF/OP CNSLTJ NEW/EST LOW 30: CPT | Performed by: OBSTETRICS & GYNECOLOGY

## 2021-02-08 NOTE — PROGRESS NOTES
Outpatient Maternal-Fetal Medicine Consultation    Dear Dr. Chilango Wong    Thank you for requesting ultrasound evaluation and maternal fetal medicine consultation on your patient Jorge Alberto Myles.   As you are aware she is a 28year old female  with a Used    Alcohol use: Not Currently      Frequency: Monthly or less      Binge frequency: Never      Comment: hx alcohol use disorder    Drug use: Not Currently      Types: Cannabis      Comment: \"doesn't work for her\"         PHYSICAL EXAMINATION:  BP 11 is a higher rate of:  · Fetal malformations  · Preeclampsia  · Gestational diabetes  · Intrauterine fetal death    As a result, enhanced pregnancy surveillance is advised for these patients including a comprehensive ultrasound to assess for fetal malformat weekly testing starting at 36 weeks of gestation would drop the risk of fetal death from 5.2 to 1.3 per 1000 pregnancies.  While a policy of antepartum testing in older women does increase the chance that a women will be induced (71 inductions per fetal candi lowest false positive rate) for the detection of fetal aneuploidy amongst high-risk patients. The limitations of detailed mid-trimester sonography was reviewed with the patient.  First trimester screening and second trimester multiple-marker serum serum scr pregnancy-induced hypertension, Chronic hypertension,  Chronic renal disease,  Antiphospholipid antibody syndrome or inherited thrombophilia, Vascular or connective tissue disease, Diabetes mellitus (pregestational and gestational),  Multifetal gestation, or maternal factors, with significant overlap among these entities. The susceptibility to FGR is also heritable; in epidemiologic studies, women who were SGA themselves at birth have a two-fold increase in risk of FGR in their offspring.  Women who give b protect against the development of new onset major depression or relapse of existing major depressive illness. Women who discontinue or reduce their doses of antidepressants are at particularly high risk.   The overall risk of suicide in pregnancy is low; d hyper-reflexia, and jitteriness or tremor. Exposure to SSRIs late in pregnancy has also been associated with persistent pulmonary hypertension of the .    Adverse effects may be due to toxic effects of the SSRI or drug withdrawal due to discontinua further emphasizes the need for an individualized approach to care.                  IMPRESSION:  · IUP at  20w6d  AMA---NIPT low risk, declines invasive testing  · Hx preeclampsia, oligo  · Hx IUGR  · PTSD/recurrent major depression with history severe dep

## 2021-02-08 NOTE — PROGRESS NOTES
Pt for level 2 US  HX low risk cfdna  Denies pregnancy complaints  States active fetus  PMADS information given and discussed

## 2021-02-22 ENCOUNTER — HOSPITAL ENCOUNTER (OUTPATIENT)
Facility: HOSPITAL | Age: 36
Setting detail: OBSERVATION
Discharge: ANOTHER HOSPITAL | End: 2021-02-22
Attending: OBSTETRICS & GYNECOLOGY | Admitting: OBSTETRICS & GYNECOLOGY
Payer: COMMERCIAL

## 2021-02-22 ENCOUNTER — PATIENT MESSAGE (OUTPATIENT)
Dept: OBGYN CLINIC | Facility: CLINIC | Age: 36
End: 2021-02-22

## 2021-02-22 ENCOUNTER — HOSPITAL ENCOUNTER (EMERGENCY)
Facility: HOSPITAL | Age: 36
Discharge: HOME HEALTH | End: 2021-02-22
Payer: COMMERCIAL

## 2021-02-22 ENCOUNTER — HOSPITAL ENCOUNTER (OUTPATIENT)
Facility: HOSPITAL | Age: 36
End: 2021-02-22
Attending: OBSTETRICS & GYNECOLOGY | Admitting: OBSTETRICS & GYNECOLOGY
Payer: COMMERCIAL

## 2021-02-22 ENCOUNTER — HOSPITAL ENCOUNTER (OUTPATIENT)
Age: 36
Discharge: ANOTHER HOSPITAL | End: 2021-02-22
Payer: COMMERCIAL

## 2021-02-22 ENCOUNTER — HOSPITAL ENCOUNTER (EMERGENCY)
Facility: HOSPITAL | Age: 36
Discharge: STILL ADMITTED OR ADMINISTRATIVE | End: 2021-02-22
Payer: COMMERCIAL

## 2021-02-22 VITALS
SYSTOLIC BLOOD PRESSURE: 128 MMHG | HEART RATE: 105 BPM | DIASTOLIC BLOOD PRESSURE: 73 MMHG | OXYGEN SATURATION: 100 % | RESPIRATION RATE: 20 BRPM | TEMPERATURE: 98 F

## 2021-02-22 VITALS
TEMPERATURE: 99 F | OXYGEN SATURATION: 99 % | HEART RATE: 92 BPM | DIASTOLIC BLOOD PRESSURE: 79 MMHG | SYSTOLIC BLOOD PRESSURE: 118 MMHG | BODY MASS INDEX: 31.25 KG/M2 | HEIGHT: 63 IN | RESPIRATION RATE: 18 BRPM | WEIGHT: 176.38 LBS

## 2021-02-22 VITALS
SYSTOLIC BLOOD PRESSURE: 117 MMHG | HEART RATE: 95 BPM | RESPIRATION RATE: 18 BRPM | TEMPERATURE: 98 F | DIASTOLIC BLOOD PRESSURE: 69 MMHG

## 2021-02-22 DIAGNOSIS — O26.812 PREGNANCY RELATED FATIGUE IN SECOND TRIMESTER: ICD-10-CM

## 2021-02-22 DIAGNOSIS — Z34.92 SECOND TRIMESTER PREGNANCY: Primary | ICD-10-CM

## 2021-02-22 DIAGNOSIS — R10.9 ABDOMINAL PAIN, ACUTE: ICD-10-CM

## 2021-02-22 DIAGNOSIS — N93.9 VAGINAL SPOTTING: ICD-10-CM

## 2021-02-22 PROBLEM — Z34.90 PREGNANCY: Status: ACTIVE | Noted: 2021-02-22

## 2021-02-22 PROBLEM — Z34.90 PREGNANCY (HCC): Status: ACTIVE | Noted: 2021-02-22

## 2021-02-22 PROCEDURE — 99212 OFFICE O/P EST SF 10 MIN: CPT

## 2021-02-22 PROCEDURE — 99215 OFFICE O/P EST HI 40 MIN: CPT | Performed by: NURSE PRACTITIONER

## 2021-02-22 RX ORDER — ASPIRIN 81 MG/1
TABLET, CHEWABLE ORAL DAILY
Status: ON HOLD | COMMUNITY
End: 2021-04-04

## 2021-02-22 RX ORDER — CETIRIZINE HYDROCHLORIDE 5 MG/1
5 TABLET ORAL DAILY
Status: ON HOLD | COMMUNITY
End: 2021-05-12

## 2021-02-22 NOTE — ED NOTES
Pt has history of precclampsia with her first pregnancy. States she doesn't feel well since Thursday. She alert in no distress.

## 2021-02-22 NOTE — ED PROVIDER NOTES
Patient presents with:  Fatigue      HPI:     Nancy Moreno is a 28year old female who presents with a chief complaint of fatigue, abdominal pain, and vaginal spotting. The symptoms started 3 to 4 days ago.   The patient is currently a  2 para 1 and is 21 w file      Transportation needs        Medical: Not on file        Non-medical: Not on file    Tobacco Use      Smoking status: Former Smoker        Packs/day: 0.25        Years: 16.00        Pack years: 4      Smokeless tobacco: Never Used    Substance and without murmur  EXTREMITIES: Mild swelling to bilateral hands. PECK without difficulty  GI: Abdomen pregnant. Generalized tenderness to entire abdomen with palpation. No CVA tenderness. NEURO: No deficts.    MDM/Assessment/Plan:   Orders for this encounter:

## 2021-02-22 NOTE — ED INITIAL ASSESSMENT (HPI)
Extreme fatigue, 23 weeks pregnant, lower abd pain with cramping all over her belly, shooting pain to her back also. Spotting on Thursday, brownish and slight. Pt feels fetal movement. Also reports hands and feet swelling.
no

## 2021-02-23 ENCOUNTER — LAB ENCOUNTER (OUTPATIENT)
Dept: LAB | Age: 36
End: 2021-02-23
Attending: OBSTETRICS & GYNECOLOGY
Payer: COMMERCIAL

## 2021-02-23 ENCOUNTER — ROUTINE PRENATAL (OUTPATIENT)
Dept: OBGYN CLINIC | Facility: CLINIC | Age: 36
End: 2021-02-23
Payer: COMMERCIAL

## 2021-02-23 ENCOUNTER — PATIENT MESSAGE (OUTPATIENT)
Dept: OBGYN CLINIC | Facility: CLINIC | Age: 36
End: 2021-02-23

## 2021-02-23 VITALS
SYSTOLIC BLOOD PRESSURE: 109 MMHG | WEIGHT: 179 LBS | BODY MASS INDEX: 32 KG/M2 | HEART RATE: 114 BPM | DIASTOLIC BLOOD PRESSURE: 71 MMHG

## 2021-02-23 DIAGNOSIS — O26.812 FATIGUE DURING PREGNANCY IN SECOND TRIMESTER: ICD-10-CM

## 2021-02-23 DIAGNOSIS — Z34.92 NORMAL PREGNANCY IN SECOND TRIMESTER: Primary | ICD-10-CM

## 2021-02-23 LAB
APPEARANCE: CLEAR
BASOPHILS # BLD AUTO: 0.05 X10(3) UL (ref 0–0.2)
BASOPHILS NFR BLD AUTO: 0.4 %
DEPRECATED RDW RBC AUTO: 42.3 FL (ref 35.1–46.3)
EOSINOPHIL # BLD AUTO: 0.12 X10(3) UL (ref 0–0.7)
EOSINOPHIL NFR BLD AUTO: 0.9 %
ERYTHROCYTE [DISTWIDTH] IN BLOOD BY AUTOMATED COUNT: 13.1 % (ref 11–15)
HCT VFR BLD AUTO: 34.3 %
HGB BLD-MCNC: 11.3 G/DL
IMM GRANULOCYTES # BLD AUTO: 0.08 X10(3) UL (ref 0–1)
IMM GRANULOCYTES NFR BLD: 0.6 %
LYMPHOCYTES # BLD AUTO: 2.23 X10(3) UL (ref 1–4)
LYMPHOCYTES NFR BLD AUTO: 16.3 %
MCH RBC QN AUTO: 30 PG (ref 26–34)
MCHC RBC AUTO-ENTMCNC: 32.9 G/DL (ref 31–37)
MCV RBC AUTO: 91 FL
MONOCYTES # BLD AUTO: 0.73 X10(3) UL (ref 0.1–1)
MONOCYTES NFR BLD AUTO: 5.3 %
MULTISTIX LOT#: 1044 NUMERIC
NEUTROPHILS # BLD AUTO: 10.47 X10 (3) UL (ref 1.5–7.7)
NEUTROPHILS # BLD AUTO: 10.47 X10(3) UL (ref 1.5–7.7)
NEUTROPHILS NFR BLD AUTO: 76.5 %
PH, URINE: 6 (ref 4.5–8)
PLATELET # BLD AUTO: 322 10(3)UL (ref 150–450)
RBC # BLD AUTO: 3.77 X10(6)UL
SPECIFIC GRAVITY: 1.02 (ref 1–1.03)
TSI SER-ACNC: 1.7 MIU/ML (ref 0.36–3.74)
URINE-COLOR: YELLOW
UROBILINOGEN,SEMI-QN: 0.2 MG/DL (ref 0–1.9)
WBC # BLD AUTO: 13.7 X10(3) UL (ref 4–11)

## 2021-02-23 PROCEDURE — 3074F SYST BP LT 130 MM HG: CPT | Performed by: OBSTETRICS & GYNECOLOGY

## 2021-02-23 PROCEDURE — 3078F DIAST BP <80 MM HG: CPT | Performed by: OBSTETRICS & GYNECOLOGY

## 2021-02-23 PROCEDURE — 36415 COLL VENOUS BLD VENIPUNCTURE: CPT

## 2021-02-23 PROCEDURE — 84443 ASSAY THYROID STIM HORMONE: CPT

## 2021-02-23 PROCEDURE — 81002 URINALYSIS NONAUTO W/O SCOPE: CPT | Performed by: OBSTETRICS & GYNECOLOGY

## 2021-02-23 PROCEDURE — 85025 COMPLETE CBC W/AUTO DIFF WBC: CPT

## 2021-02-23 NOTE — PROGRESS NOTES
Pt is a 28year old female admitted to TR5/TR5-A.    Patient presents with:  Medical Complication: pt states overall not feeling well for past 3 days \"feeling fu-sony and fatigued\"; pt states chest tightness with no cough; pt states increased swelling in h

## 2021-02-23 NOTE — PROGRESS NOTES
[facility], [facility]  Obstetrics and Gynecology  Prenatal Visit  MD CHIRAG Robbins   Jorge Alberto Myles is a 28year old.matty. Margot Monaco 23w0d weeks. Here for routine prenatal visit.   Patient denies any regular uterine contractions, spontaneous rupture membr mouth daily. • Cetirizine HCl 5 MG Oral Tab Take 5 mg by mouth daily. • buPROPion HCl ER, XL, 300 MG Oral Tablet 24 Hr Take 1 tablet (300 mg total) by mouth daily. • Doxylamine Succinate, Sleep, 25 MG Oral Tab Take by mouth.      • Prenatal MV-M

## 2021-02-23 NOTE — PROGRESS NOTES
Patient was just seen at [facility] yesterday for severe nausea, dizziness, short of breath and  Fatigue no fever ,good FM patient was sent to ER from [facility] last night but patient didn't want to wait in the ER decided to come here today.  Patient missed days of work

## 2021-02-23 NOTE — TRIAGE
[facility]      Triage Note    Jorge Alberto Myles Patient Status:  Outpatient    1985 MRN [id number]   Location [address] Attending No att. providers found   [address] Day # 0 PCP Evelin Stroud MD, MD increased swelling in hands and feet; pt states one occurance brown spotting Thursday and again this morning; pt states +FM; pt states lower pelvic pressure that has been increasing discomfort    FHR tracing moderate with no decels and no cxns by TOCO; all

## 2021-02-23 NOTE — ED INITIAL ASSESSMENT (HPI)
23 wk preg already cleared by [facility].     C/o fatigue, abd cramping, swelling on and off in 4 extermities

## 2021-02-24 NOTE — TELEPHONE ENCOUNTER
Anjel Mendoza RN 2/23/2021 2:36 PM CST      ----- Message -----  From: BIBA Apparels Nani  Sent: 2/23/2021 2:28 PM CST  To: Brandy Mccloud Ob/Gyne Clinical Staff  Subject: Test Results Question     I have a question about CBC W/ DIFFERENTIAL resulted on 2/23/21

## 2021-02-24 NOTE — TELEPHONE ENCOUNTER
I spoke with patient on the phone regarding her results. No further testing at this time is necessary.

## 2021-03-05 ENCOUNTER — HOSPITAL ENCOUNTER (EMERGENCY)
Facility: HOSPITAL | Age: 36
Discharge: HOME | End: 2021-03-05
Attending: EMERGENCY MEDICINE
Payer: COMMERCIAL

## 2021-03-05 ENCOUNTER — APPOINTMENT (OUTPATIENT)
Dept: MRI IMAGING | Facility: HOSPITAL | Age: 36
End: 2021-03-05
Attending: EMERGENCY MEDICINE
Payer: COMMERCIAL

## 2021-03-05 ENCOUNTER — ROUTINE PRENATAL (OUTPATIENT)
Dept: OBGYN CLINIC | Facility: CLINIC | Age: 36
End: 2021-03-05
Payer: COMMERCIAL

## 2021-03-05 ENCOUNTER — PATIENT MESSAGE (OUTPATIENT)
Dept: OBGYN CLINIC | Facility: CLINIC | Age: 36
End: 2021-03-05

## 2021-03-05 VITALS
BODY MASS INDEX: 32 KG/M2 | SYSTOLIC BLOOD PRESSURE: 108 MMHG | RESPIRATION RATE: 18 BRPM | HEART RATE: 92 BPM | OXYGEN SATURATION: 97 % | HEIGHT: 63 IN | TEMPERATURE: 98 F | DIASTOLIC BLOOD PRESSURE: 86 MMHG

## 2021-03-05 VITALS — DIASTOLIC BLOOD PRESSURE: 79 MMHG | SYSTOLIC BLOOD PRESSURE: 128 MMHG

## 2021-03-05 DIAGNOSIS — Z34.90 PREGNANCY, UNSPECIFIED GESTATIONAL AGE: ICD-10-CM

## 2021-03-05 DIAGNOSIS — M54.9 BACK PAIN AFFECTING PREGNANCY IN SECOND TRIMESTER: ICD-10-CM

## 2021-03-05 DIAGNOSIS — O99.891 BACK PAIN AFFECTING PREGNANCY IN SECOND TRIMESTER: ICD-10-CM

## 2021-03-05 DIAGNOSIS — O26.892 PELVIC PAIN AFFECTING PREGNANCY IN SECOND TRIMESTER, ANTEPARTUM: Primary | ICD-10-CM

## 2021-03-05 DIAGNOSIS — R10.2 PELVIC PAIN IN FEMALE: Primary | ICD-10-CM

## 2021-03-05 DIAGNOSIS — R10.2 PELVIC PAIN AFFECTING PREGNANCY IN SECOND TRIMESTER, ANTEPARTUM: Primary | ICD-10-CM

## 2021-03-05 LAB
ANION GAP SERPL CALC-SCNC: 9 MMOL/L (ref 0–18)
B-HCG UR QL: POSITIVE
BASOPHILS # BLD AUTO: 0.04 X10(3) UL (ref 0–0.2)
BASOPHILS NFR BLD AUTO: 0.4 %
BILIRUB UR QL: NEGATIVE
BUN BLD-MCNC: 8 MG/DL (ref 7–18)
BUN/CREAT SERPL: 10 (ref 10–20)
CALCIUM BLD-MCNC: 8.9 MG/DL (ref 8.5–10.1)
CHLORIDE SERPL-SCNC: 108 MMOL/L (ref 98–112)
CLARITY UR: CLEAR
CO2 SERPL-SCNC: 23 MMOL/L (ref 21–32)
COLOR UR: YELLOW
CREAT BLD-MCNC: 0.8 MG/DL
DEPRECATED RDW RBC AUTO: 42.6 FL (ref 35.1–46.3)
EOSINOPHIL # BLD AUTO: 0.08 X10(3) UL (ref 0–0.7)
EOSINOPHIL NFR BLD AUTO: 0.7 %
ERYTHROCYTE [DISTWIDTH] IN BLOOD BY AUTOMATED COUNT: 13.1 % (ref 11–15)
GLUCOSE BLD-MCNC: 149 MG/DL (ref 70–99)
GLUCOSE UR-MCNC: NEGATIVE MG/DL
HCT VFR BLD AUTO: 32.3 %
HGB BLD-MCNC: 10.7 G/DL
HGB UR QL STRIP.AUTO: NEGATIVE
IMM GRANULOCYTES # BLD AUTO: 0.07 X10(3) UL (ref 0–1)
IMM GRANULOCYTES NFR BLD: 0.6 %
KETONES UR-MCNC: NEGATIVE MG/DL
LEUKOCYTE ESTERASE UR QL STRIP.AUTO: NEGATIVE
LYMPHOCYTES # BLD AUTO: 2.17 X10(3) UL (ref 1–4)
LYMPHOCYTES NFR BLD AUTO: 19.5 %
MCH RBC QN AUTO: 30.1 PG (ref 26–34)
MCHC RBC AUTO-ENTMCNC: 33.1 G/DL (ref 31–37)
MCV RBC AUTO: 90.7 FL
MONOCYTES # BLD AUTO: 0.67 X10(3) UL (ref 0.1–1)
MONOCYTES NFR BLD AUTO: 6 %
NEUTROPHILS # BLD AUTO: 8.08 X10 (3) UL (ref 1.5–7.7)
NEUTROPHILS # BLD AUTO: 8.08 X10(3) UL (ref 1.5–7.7)
NEUTROPHILS NFR BLD AUTO: 72.8 %
NITRITE UR QL STRIP.AUTO: NEGATIVE
OSMOLALITY SERPL CALC.SUM OF ELEC: 291 MOSM/KG (ref 275–295)
PH UR: 6 [PH] (ref 5–8)
PLATELET # BLD AUTO: 290 10(3)UL (ref 150–450)
POTASSIUM SERPL-SCNC: 3.8 MMOL/L (ref 3.5–5.1)
PROT UR-MCNC: NEGATIVE MG/DL
RBC # BLD AUTO: 3.56 X10(6)UL
SODIUM SERPL-SCNC: 140 MMOL/L (ref 136–145)
SP GR UR STRIP: 1.02 (ref 1–1.03)
UROBILINOGEN UR STRIP-ACNC: <2
WBC # BLD AUTO: 11.1 X10(3) UL (ref 4–11)

## 2021-03-05 PROCEDURE — 96361 HYDRATE IV INFUSION ADD-ON: CPT

## 2021-03-05 PROCEDURE — 80048 BASIC METABOLIC PNL TOTAL CA: CPT | Performed by: EMERGENCY MEDICINE

## 2021-03-05 PROCEDURE — 99285 EMERGENCY DEPT VISIT HI MDM: CPT

## 2021-03-05 PROCEDURE — 97530 THERAPEUTIC ACTIVITIES: CPT

## 2021-03-05 PROCEDURE — 96375 TX/PRO/DX INJ NEW DRUG ADDON: CPT

## 2021-03-05 PROCEDURE — 97162 PT EVAL MOD COMPLEX 30 MIN: CPT

## 2021-03-05 PROCEDURE — 72195 MRI PELVIS W/O DYE: CPT | Performed by: EMERGENCY MEDICINE

## 2021-03-05 PROCEDURE — 85025 COMPLETE CBC W/AUTO DIFF WBC: CPT | Performed by: EMERGENCY MEDICINE

## 2021-03-05 PROCEDURE — 97116 GAIT TRAINING THERAPY: CPT

## 2021-03-05 PROCEDURE — 3074F SYST BP LT 130 MM HG: CPT | Performed by: OBSTETRICS & GYNECOLOGY

## 2021-03-05 PROCEDURE — 81025 URINE PREGNANCY TEST: CPT

## 2021-03-05 PROCEDURE — 74181 MRI ABDOMEN W/O CONTRAST: CPT | Performed by: EMERGENCY MEDICINE

## 2021-03-05 PROCEDURE — 81003 URINALYSIS AUTO W/O SCOPE: CPT | Performed by: EMERGENCY MEDICINE

## 2021-03-05 PROCEDURE — 96374 THER/PROPH/DIAG INJ IV PUSH: CPT

## 2021-03-05 PROCEDURE — 99214 OFFICE O/P EST MOD 30 MIN: CPT | Performed by: OBSTETRICS & GYNECOLOGY

## 2021-03-05 PROCEDURE — 3078F DIAST BP <80 MM HG: CPT | Performed by: OBSTETRICS & GYNECOLOGY

## 2021-03-05 RX ORDER — HYDROCODONE BITARTRATE AND ACETAMINOPHEN 5; 325 MG/1; MG/1
1 TABLET ORAL EVERY 6 HOURS PRN
Qty: 15 TABLET | Refills: 0 | Status: ON HOLD | OUTPATIENT
Start: 2021-03-05 | End: 2021-04-13

## 2021-03-05 RX ORDER — ONDANSETRON 2 MG/ML
4 INJECTION INTRAMUSCULAR; INTRAVENOUS ONCE
Status: COMPLETED | OUTPATIENT
Start: 2021-03-05 | End: 2021-03-05

## 2021-03-05 RX ORDER — MORPHINE SULFATE 4 MG/ML
2 INJECTION, SOLUTION INTRAMUSCULAR; INTRAVENOUS ONCE
Status: COMPLETED | OUTPATIENT
Start: 2021-03-05 | End: 2021-03-05

## 2021-03-05 RX ORDER — ACETAMINOPHEN 500 MG
1000 TABLET ORAL ONCE
Status: COMPLETED | OUTPATIENT
Start: 2021-03-05 | End: 2021-03-05

## 2021-03-05 NOTE — ED INITIAL ASSESSMENT (HPI)
C/o pelvic pain x 2 weeks with difficulty walking r/t pain. Evaluated by her OB in the office today. 24 weeks preg.

## 2021-03-05 NOTE — TELEPHONE ENCOUNTER
From: Tino Prabhakar  To: Ashley Ulrich MD, MD  Sent: 3/5/2021 5:55 AM CST  Subject: Other    Hi Dr. Alejandra Carmichael,    This past week, I've been experiencing severe pelvic/pubic/groin area pain.  It is limiting my ability to walk, get up, move, go up and do

## 2021-03-05 NOTE — ED PROVIDER NOTES
Patient Seen in: [facility] AND [facility] Emergency Department    History   Patient presents with:  Pelvic Pain  Difficulty Walking    Stated Complaint: pelvic pain difficulty walking sent by OB from the office    HPI    Patient is here with pelvic pain for 3 m GUMMIES/DHA & FA OR),           Review of Systems  Constitutional: no fever  Cardiovascular: no chest pain  Respiratory: no shortness of breath  Genitourinary: no dysuria        Positive for stated complaint: pelvic pain difficulty walking sent by OB from discussed giving anything IV she did want pain medication we will give her morphine 2 mg and Zofran with that as well. We will do blood work urinalysis as well as do MRI to ensure no acute serious process going on. Patient work-up was reassuring.   I di results for these tests on the individual orders.    RAINBOW DRAW BLUE   RAINBOW DRAW GOLD        MDM     99% Normal  Pulse oximetry         Disposition and Plan     We recommend that you schedule follow up care with a primary care provider within the next

## 2021-03-05 NOTE — CM/SW NOTE
Cm notified patient is requesting physical therapy at home    Physical therapy notified for eval    Per RN, Pt recommends outpatient physical therapy, RX provided by MD

## 2021-03-05 NOTE — TELEPHONE ENCOUNTER
Please contact patient and have her make an appointment to be seen in the office with one of our providers to discuss her concerns.

## 2021-03-05 NOTE — PHYSICAL THERAPY NOTE
PHYSICAL THERAPY EVALUATION - INPATIENT     Room Number: 11/11  Evaluation Date: 3/5/2021  Type of Evaluation: Initial   Physician Order: See Comment for Specific Order(unable to ambulate)    Presenting Problem: severe L hip/pelvic pain, inability to ambu with twisting and bending. Pt educated on log roll to reduce hip pain. Pt transferred sit to supine at MOD A for BLE management, verbal cues for sequencing. Spouse present for education and to assist at home.  Pt educated on positioning in bed to reduce nickolas likely related to current pregnancy, recommend pt f/u with OP Women's Health specialist to address current deficits. PT recommendation home with 24 hr supervision, OP Women's Health PT at d/c.      Patient will benefit from continued IP PT services to addre functional limits     Lower extremity ROM is within functional limits except L hip flexion ROM limited to ~90 deg PROM and AROM d/t pain    Lower extremity strength is within functional limits     BALANCE  Static Sitting: Fair  Dynamic Sitting: Fair  [employer] BLE management, verbal cues for sequencing              Supine to sit tx via log roll to L at MIN A, assist for trunk control, verbal cues for sequencing    Transfers: sit to stand tx with rolling walker x 3 reps at SBA, verbal cues for walker positioning

## 2021-03-05 NOTE — PROGRESS NOTES
Problem :  Patient complains of severe pain across her lower abdomen and pubic area and lower back progressively worse over the last 3 to 4 days with inability to walk without support from her .   She states that the pain began to some degree 3 month

## 2021-03-24 ENCOUNTER — OFFICE VISIT (OUTPATIENT)
Dept: FAMILY MEDICINE CLINIC | Facility: CLINIC | Age: 36
End: 2021-03-24
Payer: COMMERCIAL

## 2021-03-24 VITALS
HEIGHT: 63 IN | WEIGHT: 179 LBS | SYSTOLIC BLOOD PRESSURE: 115 MMHG | TEMPERATURE: 97 F | OXYGEN SATURATION: 98 % | DIASTOLIC BLOOD PRESSURE: 70 MMHG | HEART RATE: 102 BPM | RESPIRATION RATE: 16 BRPM | BODY MASS INDEX: 31.71 KG/M2

## 2021-03-24 DIAGNOSIS — J31.0 OTHER RHINITIS: Primary | ICD-10-CM

## 2021-03-24 DIAGNOSIS — Z34.90 PREGNANCY, UNSPECIFIED GESTATIONAL AGE: ICD-10-CM

## 2021-03-24 LAB
OPERATOR ID: NORMAL
RAPID SARS-COV-2 BY PCR: NOT DETECTED

## 2021-03-24 PROCEDURE — U0002 COVID-19 LAB TEST NON-CDC: HCPCS | Performed by: NURSE PRACTITIONER

## 2021-03-24 PROCEDURE — 3074F SYST BP LT 130 MM HG: CPT | Performed by: NURSE PRACTITIONER

## 2021-03-24 PROCEDURE — 3078F DIAST BP <80 MM HG: CPT | Performed by: NURSE PRACTITIONER

## 2021-03-24 PROCEDURE — 3008F BODY MASS INDEX DOCD: CPT | Performed by: NURSE PRACTITIONER

## 2021-03-24 PROCEDURE — 99213 OFFICE O/P EST LOW 20 MIN: CPT | Performed by: NURSE PRACTITIONER

## 2021-03-24 NOTE — PROGRESS NOTES
Rob Valadez    Dear  Baby Jamul    Thank you for requesting an ultrasound and maternal-fetal medicine consultation on your patient Lev Estrada.   As you are aware she is a 28year old female  with a pisano p detailed review.     IMPRESSION:  · IUP at 28w0d  · AMA---NIPT low risk, declines invasive testing  · Hx preeclampsia, oligo  · Hx IUGR  · PTSD/recurrent major depression with history severe depression     RECOMMENDATIONS:  · Continue care with Dr. Chel Pfeiffer

## 2021-03-24 NOTE — PROGRESS NOTES
CHIEF COMPLAINT:   Patient presents with:  Sinus Problem: On-going sinus/nasal issues for the past few weeks, more bothersome the past 4 days. HPI:   Sarkis Tatum is a 28year old female who presents for nasal/sinus issues.   Pt is 27 weeks pregna Diagnosis Date   • ADHD    • Anxiety    • Chronic post-traumatic stress disorder (PTSD)    • Decorative tattoo    • Depression    • History of anorexia nervosa       Past Surgical History:   Pro[facility] Laterality Date   •                [facility])  Pregnancy, unspecified gestational age    PLAN:   - Negative rapid COVID test.  - Suspect pregnancy rhinitis (vs allergies). No signs of true bacterial infection/sinusitis.   - Advised nasal saline, elevate HOB at night, keep beneficial for treatment of allergic rhinitis [7]. ? Exercise – Regular physical exercise can help by causing physiologic nasal vasoconstriction [8,9].     ?Nasal dilator strips – Adhesive strips that mechanically hold the nasal valves open (external nasa

## 2021-03-24 NOTE — PATIENT INSTRUCTIONS
Nondrug therapies — Nonpharmacologic interventions may be considered first in pregnant women with rhinitis symptoms:    ?Saline nasal sprays or nasal irrigation – Nasal irrigation is a nonpharmacologic intervention that is associated with improveme

## 2021-03-30 ENCOUNTER — HOSPITAL ENCOUNTER (OUTPATIENT)
Dept: PERINATAL CARE | Facility: HOSPITAL | Age: 36
Discharge: HOME | End: 2021-03-30
Attending: OBSTETRICS & GYNECOLOGY
Payer: COMMERCIAL

## 2021-03-30 VITALS
WEIGHT: 179 LBS | SYSTOLIC BLOOD PRESSURE: 126 MMHG | HEART RATE: 116 BPM | DIASTOLIC BLOOD PRESSURE: 82 MMHG | BODY MASS INDEX: 32 KG/M2

## 2021-03-30 DIAGNOSIS — O09.523 AMA (ADVANCED MATERNAL AGE) MULTIGRAVIDA 35+, THIRD TRIMESTER: ICD-10-CM

## 2021-03-30 DIAGNOSIS — O09.523 AMA (ADVANCED MATERNAL AGE) MULTIGRAVIDA 35+, THIRD TRIMESTER: Primary | ICD-10-CM

## 2021-03-30 PROCEDURE — 99213 OFFICE O/P EST LOW 20 MIN: CPT | Performed by: OBSTETRICS & GYNECOLOGY

## 2021-03-30 PROCEDURE — 76816 OB US FOLLOW-UP PER FETUS: CPT | Performed by: OBSTETRICS & GYNECOLOGY

## 2021-04-01 ENCOUNTER — HOSPITAL ENCOUNTER (INPATIENT)
Facility: HOSPITAL | Age: 36
LOS: 3 days | Discharge: HOME | DRG: 832 | End: 2021-04-04
Attending: OBSTETRICS & GYNECOLOGY | Admitting: OBSTETRICS & GYNECOLOGY
Payer: COMMERCIAL

## 2021-04-01 PROBLEM — N93.9 VAGINAL BLEEDING: Status: ACTIVE | Noted: 2021-04-01

## 2021-04-01 PROCEDURE — 99222 1ST HOSP IP/OBS MODERATE 55: CPT | Performed by: OBSTETRICS & GYNECOLOGY

## 2021-04-01 PROCEDURE — 59025 FETAL NON-STRESS TEST: CPT | Performed by: OBSTETRICS & GYNECOLOGY

## 2021-04-01 RX ORDER — DIPHENHYDRAMINE HCL 50 MG
50 CAPSULE ORAL NIGHTLY PRN
Status: DISCONTINUED | OUTPATIENT
Start: 2021-04-01 | End: 2021-04-04

## 2021-04-01 RX ORDER — BETAMETHASONE SODIUM PHOSPHATE AND BETAMETHASONE ACETATE 3; 3 MG/ML; MG/ML
12 INJECTION, SUSPENSION INTRA-ARTICULAR; INTRALESIONAL; INTRAMUSCULAR; SOFT TISSUE EVERY 24 HOURS
Status: COMPLETED | OUTPATIENT
Start: 2021-04-01 | End: 2021-04-02

## 2021-04-01 RX ORDER — DEXTROSE, SODIUM CHLORIDE, SODIUM LACTATE, POTASSIUM CHLORIDE, AND CALCIUM CHLORIDE 5; .6; .31; .03; .02 G/100ML; G/100ML; G/100ML; G/100ML; G/100ML
INJECTION, SOLUTION INTRAVENOUS CONTINUOUS
Status: DISCONTINUED | OUTPATIENT
Start: 2021-04-01 | End: 2021-04-04

## 2021-04-01 RX ORDER — ACETAMINOPHEN 500 MG
1000 TABLET ORAL EVERY 6 HOURS PRN
Status: DISCONTINUED | OUTPATIENT
Start: 2021-04-01 | End: 2021-04-04

## 2021-04-01 RX ORDER — BETAMETHASONE SODIUM PHOSPHATE AND BETAMETHASONE ACETATE 3; 3 MG/ML; MG/ML
INJECTION, SUSPENSION INTRA-ARTICULAR; INTRALESIONAL; INTRAMUSCULAR; SOFT TISSUE
Status: COMPLETED
Start: 2021-04-01 | End: 2021-04-01

## 2021-04-01 NOTE — CONSULTS
[facility]  Maternal-Fetal Medicine Inpatient Consultation    Date of Admission:  4/1/2021  Date of Consult:  4/1/2021    Reason for Consult:   A maternal-fetal medicine consultation was requested secondary to vaginal bleeding.     History of Allergies:  No Known Allergies   Current Meds:  No current outpatient medications on file.         Medications:    Current Facility-Administered Medications:   •  dextrose 5 % /lactated ringers infusion, , Intravenous, Continuous  •  acetaminophen (TY 272.0 04/01/2021       HIV Antigen Antibody Combo   Date Value Ref Range Status   11/20/2020 Non-Reactive Non-Reactive Final     Fibrinogen 524.     OBSTETRIC ULTRASOUND  N/A  See PACS/Imaging Tab For Complete Ultrasound Report  I interpreted the results an majority of the time (>50%) was spent in review of records, consultation and coordination of care. Our discussion is summarized above. The approximate physician face-to-face time was 45 minutes.       Sean Justice MD  4/1/2021  1:01 PM

## 2021-04-01 NOTE — PROGRESS NOTES
Pt is a 28year old female admitted to TR1/TR1-A. Patient presents with:  Vaginal Bleeding: Pt reported seeing mucousy bright red, dark red, and pinkish bleeding this morning when she wiped at home.  No bleeding on her pad or underwear, and a scant amount

## 2021-04-01 NOTE — H&P
[address] Patient Status:  Observation    1985 MRN [id number]   Location [address]  Attending Yelena Flores MD   Hosp Day # 0 PCP Severa Dixons, MD, MD tobacco: Never Used    Alcohol use: Not Currently      Comment: hx alcohol use disorder       Allergies/Medications: Allergies:   No Known Allergies  Medications:  aspirin 81 MG Oral Chew Tab, Chew by mouth daily. , Disp: , Rfl: , Past Week at Unknown sin List:     History of anorexia nervosa     Attention deficit hyperactivity disorder (ADHD), predominantly inattentive type     Elevated d-dimer     Episode of recurrent major depressive disorder (HCC)     Musculoskeletal chest pain     Chronic post-traumati

## 2021-04-02 PROCEDURE — 99231 SBSQ HOSP IP/OBS SF/LOW 25: CPT | Performed by: OBSTETRICS & GYNECOLOGY

## 2021-04-02 RX ORDER — ACETAMINOPHEN 500 MG
TABLET ORAL
Status: DISPENSED
Start: 2021-04-02 | End: 2021-04-02

## 2021-04-02 RX ORDER — ACETAMINOPHEN 500 MG
TABLET ORAL
Status: COMPLETED
Start: 2021-04-02 | End: 2021-04-02

## 2021-04-02 RX ORDER — BUPROPION HYDROCHLORIDE 300 MG/1
300 TABLET ORAL DAILY
Status: DISCONTINUED | OUTPATIENT
Start: 2021-04-02 | End: 2021-04-04

## 2021-04-02 RX ORDER — ZOLPIDEM TARTRATE 5 MG/1
5 TABLET ORAL NIGHTLY PRN
Status: DISCONTINUED | OUTPATIENT
Start: 2021-04-02 | End: 2021-04-04

## 2021-04-02 RX ORDER — HYDROCODONE BITARTRATE AND ACETAMINOPHEN 5; 325 MG/1; MG/1
1 TABLET ORAL ONCE
Status: COMPLETED | OUTPATIENT
Start: 2021-04-02 | End: 2021-04-02

## 2021-04-02 RX ORDER — CHOLECALCIFEROL (VITAMIN D3) 25 MCG
1 TABLET,CHEWABLE ORAL DAILY
Status: DISCONTINUED | OUTPATIENT
Start: 2021-04-02 | End: 2021-04-04

## 2021-04-02 RX ORDER — CETIRIZINE HYDROCHLORIDE 10 MG/1
10 TABLET ORAL NIGHTLY
Status: DISCONTINUED | OUTPATIENT
Start: 2021-04-02 | End: 2021-04-04

## 2021-04-02 NOTE — PROGRESS NOTES
Patient noticed some bright red bleeding when using washroom at 0900. Patient flushed toilet but did save last wipe she used. On wipe rn visualized a small amount of old blood/ mucous. Lamra Lopez md notified.  Patient to be observed for 48 hours from 0900 ble

## 2021-04-02 NOTE — PROGRESS NOTES
[facility]OSP - [facility]    Antepartum  Progress Note    Lanny Ruthie Patient Status:  Inpatient    1985 MRN [id number]   Location [address] [facility] Attending Joseph Rdz MD   Hosp Day # 1 PCP Carola Aquino MD,

## 2021-04-03 RX ORDER — HYDROCODONE BITARTRATE AND ACETAMINOPHEN 5; 325 MG/1; MG/1
1 TABLET ORAL DAILY
Status: DISCONTINUED | OUTPATIENT
Start: 2021-04-03 | End: 2021-04-04

## 2021-04-03 NOTE — PROGRESS NOTES
[facility] HOSP - [facility]    OB/GYNE Progress Note      Formerly [facility] Patient Status:  Inpatient    1985 MRN [id number]   [address]  Attending Diego Braxton MD   Hosp Day # 2 PCP Emily Dobson MD, Margie Mejia HCT 33.1 (L) 04/01/2021    .0 04/01/2021    CREATSERUM 0.80 03/05/2021    BUN 8 03/05/2021     03/05/2021    K 3.8 03/05/2021     03/05/2021    CO2 23.0 03/05/2021     (H) 03/05/2021    CA 8.9 03/05/2021    ALB 3.7 11/20/2020    A

## 2021-04-04 VITALS
HEART RATE: 93 BPM | RESPIRATION RATE: 18 BRPM | TEMPERATURE: 98 F | DIASTOLIC BLOOD PRESSURE: 71 MMHG | SYSTOLIC BLOOD PRESSURE: 121 MMHG

## 2021-04-04 PROBLEM — R82.71 GBS BACTERIURIA: Status: ACTIVE | Noted: 2021-04-04

## 2021-04-04 PROCEDURE — 59025 FETAL NON-STRESS TEST: CPT | Performed by: OBSTETRICS & GYNECOLOGY

## 2021-04-04 PROCEDURE — 99239 HOSP IP/OBS DSCHRG MGMT >30: CPT | Performed by: OBSTETRICS & GYNECOLOGY

## 2021-04-04 RX ORDER — ACETAMINOPHEN 500 MG
1000 TABLET ORAL EVERY 6 HOURS PRN
Refills: 0 | Status: ON HOLD | COMMUNITY
Start: 2021-04-04 | End: 2021-05-12

## 2021-04-04 NOTE — DISCHARGE SUMMARY
[facility]    OB Discharge    Sharif Ugalde Patient Status:  Inpatient    1985 MRN [id number]   [address]  Attending Jaja Redmond MD   Hosp Day # 3 PCP Daisy Khan MD, MD     Date , Rfl: , 3/31/2021 at Unknown time  buPROPion HCl ER, XL, 300 MG Oral Tablet 24 Hr, Take 1 tablet (300 mg total) by mouth daily. , Disp: , Rfl: , 3/31/2021 at Unknown time  Doxylamine Succinate, Sleep, 25 MG Oral Tab, Take by mouth., Disp: , Rfl: , 3/31/202 post-traumatic stress disorder (PTSD)     Thoughts of self harm     Supervision of elderly multigravida, unspecified trimester     Previous  delivery, antepartum condition or complication     Pregnancy     Pelvic pain affecting pregnancy in second

## 2021-04-04 NOTE — PLAN OF CARE
Problem: ANTEPARTUM/LABOR and DELIVERY  Goal: Maintain pregnancy as long as maternal and/or fetal condition is stable  Description: INTERVENTIONS:  - Maternal surveillance  - Fetal surveillance  - Monitor uterine activity  - Medications as ordered  - Bed you? Having a girl   - Provide timely, complete, and accurate information to patient/family  - Incorporate patient and family knowledge, values, beliefs, and cultural backgrounds into the planning and delivery of care  - Encourage patient/family to partici

## 2021-04-04 NOTE — PROGRESS NOTES
Discharge orders received and AVS discharge paperwork reviewed w/ pt. All questions answered and encouraged. Pt discharged to home in stable condition via wheelchair accompanied by .

## 2021-04-05 ENCOUNTER — ROUTINE PRENATAL (OUTPATIENT)
Dept: OBGYN CLINIC | Facility: CLINIC | Age: 36
End: 2021-04-05
Payer: COMMERCIAL

## 2021-04-05 VITALS — DIASTOLIC BLOOD PRESSURE: 86 MMHG | BODY MASS INDEX: 33 KG/M2 | WEIGHT: 187.38 LBS | SYSTOLIC BLOOD PRESSURE: 132 MMHG

## 2021-04-05 DIAGNOSIS — O34.219 PREVIOUS CESAREAN DELIVERY, ANTEPARTUM CONDITION OR COMPLICATION: ICD-10-CM

## 2021-04-05 DIAGNOSIS — O09.529 SUPERVISION OF ELDERLY MULTIGRAVIDA, UNSPECIFIED TRIMESTER: Primary | ICD-10-CM

## 2021-04-05 PROCEDURE — 3079F DIAST BP 80-89 MM HG: CPT | Performed by: OBSTETRICS & GYNECOLOGY

## 2021-04-05 PROCEDURE — 3075F SYST BP GE 130 - 139MM HG: CPT | Performed by: OBSTETRICS & GYNECOLOGY

## 2021-04-05 NOTE — PROGRESS NOTES
[address] [facility]  Obstetrics and Gynecology  Prenatal Visit  Thang Siegel MD    CHIRAG Camara is a 28year old.o.  28w6d weeks. Here for routine prenatal visit and is without complaints. Patient feeling normal fetal movement.   She had viable IUP at 28w6d weeks.   AMA pregnancy with prior C/S.    (O09.529) Supervision of elderly multigravida, unspecified trimester  (primary encounter diagnosis)  Plan: GLUCOSE 1HR OB    (O34.219) Previous  delivery, antepartum condition or complica

## 2021-04-05 NOTE — PAYOR COMM NOTE
--------------  ADMISSION REVIEW     Payor: [employer]  Subscriber #:  [id number]  Authorization Number: [id number]    Admit date: 4/1/21  Admit time:  1:10 PM       Admitting Physician: Chepe Benavides MD  Attending Physician:  No att. providers found (PTSD)    • Decorative tattoo    • Depression    • History of anorexia nervosa      Past Social History:   Past Surgical History:   Procedure Laterality Date   •            Family History:   Family History   Problem Relation Age of Onset   • S speculum: dark blood in vault. No active bleeding per cervix. No inflammation of cervix.   Closed/thick/posterior/-3      Results:   @BRIEFLAB(TREPONEMALAB,RAPIDHIVSCRN,HBVSAG,ABO,RH,WBC,HGB,HCT,PLT,CREATSERUM,BUN,NA,K,CL,CO2,GLU,CA,ALB,ALKPHO,BILT,TP,AST MD on 4/1/2021 12:32 PM         [facility] Consultation   Date of Admission: 4/1/2021   Date of Consult: 4/1/2021   Reason for Consult:   A maternal-fetal medicine consultation was requested secondary to va Oligohydramnios     Allergies:   No Known Allergies   Current Meds:   No current outpatient medications on file.       Medications:     Current Facility-Administered Medications:   • dextrose 5 % /lactated ringers infusion, , Intravenous, Continuous   • ace Antibody Combo   Date Value Ref Range Status   11/20/2020 Non-Reactive Non-Reactive Final     Fibrinogen 524. OBSTETRIC ULTRASOUND   N/A   See PACS/Imaging Tab For Complete Ultrasound Report   I interpreted the results and reviewed them with the patient. and coordination of care. Our discussion is summarized above. The approximate physician face-to-face time was 45 minutes.    Teddy Taylor MD   4/1/2021   1:01 PM    4/2     Antepartum  Progress Note           Monika Monae Patient Status:  Inpatient   D bleeding  Resolved.   Last episode 4/2/2021 @ 9 AM.  Negotiated that patient can have one Norco 5/325 daily at the time of her choosing.                Discussed with:  nurse                               patient and spouse      Plan discussed with patient 42 11/20/2020     BILT 0.3 11/20/2020     TP 7.7 11/20/2020     AST 15 11/20/2020     ALT 18 11/20/2020     TSH 1.700 02/23/2021               Lab Results   Component Value Date     COLORUR Yellow 04/01/2021     CLARITY Hazy (A) 04/01/2021     SPECGRAVITY Medical History:        Past Medical History:   Diagnosis Date   • ADHD     • Anxiety     • Chronic post-traumatic stress disorder (PTSD)     • Decorative tattoo     • Depression     • History of anorexia nervosa        Past Social History:         Past [place] cooperative  Respiratory: clear to auscultation bilaterally  Cardiac: regular rate and rhythm, S1, S2 normal, no murmur, click, rub or gallop  Abdomen: soft, nontender  Fetal heart rate:  Reactive; category 1  Uterine monitoring:  No u/c  Sterile vaginal e and possible complications have been discussed in detail with the patient. Pre-admission, admission, and post admission procedures and expectations were discussed in detail.     All questions answered, all appropriate consents will be signed at the [facility]

## 2021-04-13 ENCOUNTER — HOSPITAL ENCOUNTER (OUTPATIENT)
Dept: PERINATAL CARE | Facility: HOSPITAL | Age: 36
Setting detail: OBSERVATION
Discharge: HOME | End: 2021-04-13
Attending: OBSTETRICS & GYNECOLOGY
Payer: COMMERCIAL

## 2021-04-13 ENCOUNTER — HOSPITAL ENCOUNTER (OUTPATIENT)
Facility: HOSPITAL | Age: 36
Setting detail: OBSERVATION
Discharge: HOME | End: 2021-04-14
Attending: OBSTETRICS & GYNECOLOGY | Admitting: OBSTETRICS & GYNECOLOGY
Payer: COMMERCIAL

## 2021-04-13 DIAGNOSIS — O47.03 PRETERM UTERINE CONTRACTIONS IN THIRD TRIMESTER, ANTEPARTUM: Primary | ICD-10-CM

## 2021-04-13 DIAGNOSIS — N93.9 VAGINAL BLEEDING: ICD-10-CM

## 2021-04-13 PROCEDURE — 76816 OB US FOLLOW-UP PER FETUS: CPT | Performed by: OBSTETRICS & GYNECOLOGY

## 2021-04-13 PROCEDURE — 99220 INITIAL OBSERVATION CARE,LEVL III: CPT | Performed by: OBSTETRICS & GYNECOLOGY

## 2021-04-13 PROCEDURE — 59025 FETAL NON-STRESS TEST: CPT | Performed by: OBSTETRICS & GYNECOLOGY

## 2021-04-13 RX ORDER — SODIUM CHLORIDE, SODIUM LACTATE, POTASSIUM CHLORIDE, CALCIUM CHLORIDE 600; 310; 30; 20 MG/100ML; MG/100ML; MG/100ML; MG/100ML
INJECTION, SOLUTION INTRAVENOUS CONTINUOUS
Status: DISCONTINUED | OUTPATIENT
Start: 2021-04-13 | End: 2021-04-14

## 2021-04-13 RX ORDER — NIFEDIPINE 10 MG/1
10 CAPSULE ORAL EVERY 6 HOURS
Status: DISCONTINUED | OUTPATIENT
Start: 2021-04-13 | End: 2021-04-14

## 2021-04-13 RX ORDER — HYDROCODONE BITARTRATE AND ACETAMINOPHEN 5; 325 MG/1; MG/1
1 TABLET ORAL ONCE
Status: COMPLETED | OUTPATIENT
Start: 2021-04-13 | End: 2021-04-13

## 2021-04-13 RX ORDER — CETIRIZINE HYDROCHLORIDE 5 MG/1
5 TABLET ORAL DAILY
Status: DISCONTINUED | OUTPATIENT
Start: 2021-04-13 | End: 2021-04-14

## 2021-04-13 RX ORDER — DOCUSATE SODIUM 100 MG/1
100 CAPSULE, LIQUID FILLED ORAL 2 TIMES DAILY
Status: DISCONTINUED | OUTPATIENT
Start: 2021-04-13 | End: 2021-04-14

## 2021-04-13 RX ORDER — ACETAMINOPHEN 500 MG
1000 TABLET ORAL EVERY 6 HOURS PRN
Status: DISCONTINUED | OUTPATIENT
Start: 2021-04-13 | End: 2021-04-14

## 2021-04-13 RX ORDER — NIFEDIPINE 10 MG/1
10 CAPSULE ORAL EVERY 6 HOURS SCHEDULED
Status: DISCONTINUED | OUTPATIENT
Start: 2021-04-13 | End: 2021-04-13

## 2021-04-13 RX ORDER — ACETAMINOPHEN 500 MG
500 TABLET ORAL EVERY 6 HOURS PRN
Status: DISCONTINUED | OUTPATIENT
Start: 2021-04-13 | End: 2021-04-14

## 2021-04-13 RX ORDER — CALCIUM CARBONATE 200(500)MG
1000 TABLET,CHEWABLE ORAL
Status: DISCONTINUED | OUTPATIENT
Start: 2021-04-13 | End: 2021-04-14

## 2021-04-13 RX ORDER — HYDROCODONE BITARTRATE AND ACETAMINOPHEN 5; 325 MG/1; MG/1
1 TABLET ORAL EVERY 6 HOURS PRN
Status: ON HOLD | COMMUNITY
End: 2021-05-12

## 2021-04-13 RX ORDER — ZOLPIDEM TARTRATE 5 MG/1
5 TABLET ORAL NIGHTLY PRN
Status: DISCONTINUED | OUTPATIENT
Start: 2021-04-13 | End: 2021-04-14

## 2021-04-13 NOTE — PROGRESS NOTES
Pt is a 28year old female admitted to 375/375-A. Patient presents with:  R/o  Labor: cxs since 2 am  Vaginal Bleeding: started bleeding at 6 am and \"lost mucous plug at 10 am\"     Pt is  30w0d intra-uterine pregnancy.   History obtained,

## 2021-04-13 NOTE — PLAN OF CARE
Problem: ANTEPARTUM/LABOR and DELIVERY  Goal: Maintain pregnancy as long as maternal and/or fetal condition is stable  Description: INTERVENTIONS:  - Maternal surveillance  - Fetal surveillance  - Monitor uterine activity  - Medications as ordered  - Bed perspectives and choices  Outcome: Progressing     Problem: Patient/Family Goals  Goal: Patient/Family Long Term Goal  Description: Patient's Long Term Goal: Maintain pregnancy    Interventions:  - Multidisciplinary approach to provide holistic care  - Pro

## 2021-04-13 NOTE — H&P
[facility]    Triage History & Physical    [facility] Patient Status:  Observation    1985 MRN [id number]   Location [address]  Attending Lucy Cedeno MD   Hosp Day # 0 PCP ALPHONSO Celeste Chronic post-traumatic stress disorder (PTSD)    • Decorative tattoo    • Depression    • History of anorexia nervosa      Past Social History:   Past Surgical History:   Procedure Laterality Date   •            Family History:   Family Histor exam: closed/thick/posterior/-3 per RN; no blood in vagina      Results:   @BRIEFLAB(TREPONEMALAB,RAPIDHIVSCRN,HBVSAG,ABO,RH,WBC,HGB,HCT,PLT,CREATSERUM,BUN,NA,K,CL,CO2,GLU,CA,ALB,ALKPHO,BILT,TP,AST,ALT,PTT,INR,PT,T4F,TSH,TSHREFLEX,SHANNON,LIP,GGT,DDIMER,ESRML,

## 2021-04-13 NOTE — PROGRESS NOTES
Pt is a 28year old female admitted to TR2/TR2-A. Patient presents with:  R/o  Labor: cxs since 2 am  Vaginal Bleeding: started bleeding at 6 am and \"lost mucous plug at 10 am\"     Pt is  30w0d intra-uterine pregnancy.   History obtained,

## 2021-04-13 NOTE — CONSULTS
[address] Patient Status:  Observation    1985 MRN [id number]   Location [facility] Attending Simi Cosme MD   Hosp Day # 0 PCP Ashley Ulrich MD, MD     [facility] severe oligohydramnios and pre-eclampsia-no labor. pt voices she was pre-diabetic with pregnancy.       Complications: Meconium, Oligohydramnios           Social History:  Social History    Tobacco Use      Smoking status: Former Smoker        Packs/day: 0. without cervical change      PLAN:  Observe overnight for evidence of  labor  Procardia 10mg q 6 hours to help with contractions    Thank you for allowing me to participate in the care of your patient.   Please do not hesitate to call with any questi

## 2021-04-14 ENCOUNTER — TELEPHONE (OUTPATIENT)
Dept: OBGYN CLINIC | Facility: CLINIC | Age: 36
End: 2021-04-14

## 2021-04-14 VITALS
DIASTOLIC BLOOD PRESSURE: 74 MMHG | BODY MASS INDEX: 33.13 KG/M2 | SYSTOLIC BLOOD PRESSURE: 121 MMHG | TEMPERATURE: 99 F | HEIGHT: 63 IN | WEIGHT: 187 LBS | RESPIRATION RATE: 17 BRPM | HEART RATE: 93 BPM

## 2021-04-14 PROBLEM — O47.03 PRETERM UTERINE CONTRACTIONS, ANTEPARTUM, THIRD TRIMESTER: Status: ACTIVE | Noted: 2021-04-13

## 2021-04-14 PROBLEM — O47.03 PRETERM UTERINE CONTRACTIONS, ANTEPARTUM, THIRD TRIMESTER (HCC): Status: ACTIVE | Noted: 2021-04-13

## 2021-04-14 PROCEDURE — 99217 OBSERVATION CARE DISCHARGE: CPT | Performed by: OBSTETRICS & GYNECOLOGY

## 2021-04-14 PROCEDURE — 59025 FETAL NON-STRESS TEST: CPT | Performed by: OBSTETRICS & GYNECOLOGY

## 2021-04-14 RX ORDER — ACETAMINOPHEN 500 MG
TABLET ORAL
Status: COMPLETED
Start: 2021-04-14 | End: 2021-04-14

## 2021-04-14 RX ORDER — ACETAMINOPHEN 500 MG
500 TABLET ORAL EVERY 6 HOURS PRN
Refills: 0 | Status: ON HOLD | COMMUNITY
Start: 2021-04-14 | End: 2021-05-12

## 2021-04-14 NOTE — PLAN OF CARE
Problem: ANTEPARTUM/LABOR and DELIVERY  Goal: Maintain pregnancy as long as maternal and/or fetal condition is stable  Description: INTERVENTIONS:  - Maternal surveillance  - Fetal surveillance  - Monitor uterine activity  - Medications as ordered  - Bed and choices  Outcome: Completed     Problem: Patient/Family Goals  Goal: Patient/Family Long Term Goal  Description: Patient's Long Term Goal: Maintain pregnancy    Interventions:  - Multidisciplinary approach to provide holistic care  - Provide disease sp

## 2021-04-14 NOTE — TELEPHONE ENCOUNTER
Pt was admitted to hospital overnight for bleeding & pre term contractions.  Dr Anjelica Chicas told pt to call to get return to work letter

## 2021-04-14 NOTE — PROGRESS NOTES
Per patient request, Dr. Mathew Clarke called to request for order amendment. Intermittent monitoring ordered over night. Will resume monitoring at 6am unless contractions begin to get more painful and frequent. Patient can have 1 tablet Norco 5 PRN for hip pain.

## 2021-04-14 NOTE — DISCHARGE SUMMARY
[facility]    OB/GYNE Discharge Note      Librado Gann Patient Status:  Observation    1985 MRN [id number]   [address]  Attending Daisha Hackett MD   Hosp Day # 0 PCP Loyda Ayala MD pregnancy in second trimester     Vaginal bleeding     GBS bacteriuria      uterine contractions, antepartum, third trimester     30 1/7 weeks gest age   contractions. Resolved.   Stable  Home to pelvic rest and modified bedrest  FM counting

## 2021-04-14 NOTE — TELEPHONE ENCOUNTER
If dr Franco Jaquez authorized return to work,  make write letter after confirming with dr Franco Jaquez.

## 2021-04-14 NOTE — PROGRESS NOTES
Patient states very few contractions felt since Procardia administration on 4/13. Discharge instructions explained to and given to patient.  Patient acknowledged discharge instructions, including medication changes, next appointments, and opioid medication

## 2021-04-16 NOTE — TELEPHONE ENCOUNTER
Pt Name and  verified. Pt informed of [employer]'s recommendation. States that she was requesting a note for work indicating that she is being recommended to work remotely. Note generated and routed to pt via [address]

## 2021-04-16 NOTE — TELEPHONE ENCOUNTER
Pt calling in regards to return to work note from being in the Hospital recently. Needs a note asap stating to return to work on Monday. Patient would like this in my chart and needs asap.  Please contact

## 2021-04-19 ENCOUNTER — TELEPHONE (OUTPATIENT)
Dept: OBGYN CLINIC | Facility: CLINIC | Age: 36
End: 2021-04-19

## 2021-04-19 NOTE — TELEPHONE ENCOUNTER
Esther Barcenas from [address]   stating she has been trying to reach the patient to help discuss help with options as needed. Wanted to leave this note if the insured contacts us to provide her information.      thanks

## 2021-04-21 ENCOUNTER — TELEPHONE (OUTPATIENT)
Dept: PERINATAL CARE | Facility: HOSPITAL | Age: 36
End: 2021-04-21

## 2021-04-21 NOTE — TELEPHONE ENCOUNTER
LV,  [phone], to explain cancellation of appointments on 4/27 and 5/25/21.   Requested patient return call to reschedule

## 2021-04-23 ENCOUNTER — LAB ENCOUNTER (OUTPATIENT)
Dept: LAB | Facility: HOSPITAL | Age: 36
End: 2021-04-23
Attending: OBSTETRICS & GYNECOLOGY
Payer: COMMERCIAL

## 2021-04-23 DIAGNOSIS — L29.9 ITCHING: ICD-10-CM

## 2021-04-23 DIAGNOSIS — O34.219 PREVIOUS CESAREAN DELIVERY, ANTEPARTUM CONDITION OR COMPLICATION: ICD-10-CM

## 2021-04-23 DIAGNOSIS — O09.529 SUPERVISION OF ELDERLY MULTIGRAVIDA, UNSPECIFIED TRIMESTER: ICD-10-CM

## 2021-04-23 PROCEDURE — 36415 COLL VENOUS BLD VENIPUNCTURE: CPT

## 2021-04-23 PROCEDURE — 82950 GLUCOSE TEST: CPT

## 2021-04-23 PROCEDURE — 80053 COMPREHEN METABOLIC PANEL: CPT

## 2021-04-23 PROCEDURE — 82239 BILE ACIDS TOTAL: CPT

## 2021-04-26 ENCOUNTER — TELEPHONE (OUTPATIENT)
Dept: PERINATAL CARE | Facility: HOSPITAL | Age: 36
End: 2021-04-26

## 2021-04-26 ENCOUNTER — TELEPHONE (OUTPATIENT)
Dept: OBGYN CLINIC | Facility: CLINIC | Age: 36
End: 2021-04-26

## 2021-04-26 DIAGNOSIS — R73.09 ELEVATED GLUCOSE TOLERANCE TEST: Primary | ICD-10-CM

## 2021-04-26 NOTE — TELEPHONE ENCOUNTER
----- Message from Calin Pierre MD sent at 4/25/2021  9:52 PM CDT -----  Please notify patient of abnormal 50 gram glucola that was high enough to give the diagnosis of gestational diabetes.   Patient will need to go for diabetic education and learn to

## 2021-04-27 ENCOUNTER — TELEPHONE (OUTPATIENT)
Dept: OBGYN CLINIC | Facility: CLINIC | Age: 36
End: 2021-04-27

## 2021-04-27 DIAGNOSIS — O24.419 GESTATIONAL DIABETES MELLITUS (GDM) IN THIRD TRIMESTER, GESTATIONAL DIABETES METHOD OF CONTROL UNSPECIFIED: Primary | ICD-10-CM

## 2021-04-27 NOTE — TELEPHONE ENCOUNTER
----- Message from Robbin Barboza MD sent at 4/25/2021  9:52 PM CDT -----  Please notify patient of abnormal 50 gram glucola that was high enough to give the diagnosis of gestational diabetes.   Patient will need to go for diabetic education and learn to

## 2021-04-27 NOTE — TELEPHONE ENCOUNTER
04/27/2021 Pt was informed about her Glucose test results. Pt verbalized understanding, pt did not have any further questions.

## 2021-04-29 ENCOUNTER — APPOINTMENT (OUTPATIENT)
Dept: ENDOCRINOLOGY | Facility: HOSPITAL | Age: 36
End: 2021-04-29
Attending: OBSTETRICS & GYNECOLOGY
Payer: COMMERCIAL

## 2021-05-04 ENCOUNTER — TELEPHONE (OUTPATIENT)
Dept: OBGYN CLINIC | Facility: CLINIC | Age: 36
End: 2021-05-04

## 2021-05-04 ENCOUNTER — HOSPITAL ENCOUNTER (INPATIENT)
Facility: HOSPITAL | Age: 36
LOS: 8 days | Discharge: HOME | End: 2021-05-12
Attending: OBSTETRICS & GYNECOLOGY | Admitting: OBSTETRICS & GYNECOLOGY
Payer: COMMERCIAL

## 2021-05-04 PROCEDURE — 59025 FETAL NON-STRESS TEST: CPT | Performed by: OBSTETRICS & GYNECOLOGY

## 2021-05-04 PROCEDURE — 99223 1ST HOSP IP/OBS HIGH 75: CPT | Performed by: OBSTETRICS & GYNECOLOGY

## 2021-05-04 RX ORDER — HYDRALAZINE HYDROCHLORIDE 20 MG/ML
10 INJECTION INTRAMUSCULAR; INTRAVENOUS ONCE AS NEEDED
Status: COMPLETED | OUTPATIENT
Start: 2021-05-04 | End: 2021-05-06

## 2021-05-04 RX ORDER — ACETAMINOPHEN 500 MG
1000 TABLET ORAL EVERY 6 HOURS PRN
Status: DISCONTINUED | OUTPATIENT
Start: 2021-05-04 | End: 2021-05-08

## 2021-05-04 RX ORDER — BETAMETHASONE SODIUM PHOSPHATE AND BETAMETHASONE ACETATE 3; 3 MG/ML; MG/ML
INJECTION, SUSPENSION INTRA-ARTICULAR; INTRALESIONAL; INTRAMUSCULAR; SOFT TISSUE
Status: COMPLETED
Start: 2021-05-04 | End: 2021-05-04

## 2021-05-04 RX ORDER — DIPHENHYDRAMINE HYDROCHLORIDE 50 MG/ML
50 INJECTION INTRAMUSCULAR; INTRAVENOUS EVERY 4 HOURS PRN
Status: DISCONTINUED | OUTPATIENT
Start: 2021-05-04 | End: 2021-05-08

## 2021-05-04 RX ORDER — BETAMETHASONE SODIUM PHOSPHATE AND BETAMETHASONE ACETATE 3; 3 MG/ML; MG/ML
12 INJECTION, SUSPENSION INTRA-ARTICULAR; INTRALESIONAL; INTRAMUSCULAR; SOFT TISSUE EVERY 24 HOURS
Status: DISPENSED | OUTPATIENT
Start: 2021-05-04 | End: 2021-05-06

## 2021-05-04 RX ORDER — ACETAMINOPHEN 500 MG
500 TABLET ORAL EVERY 6 HOURS PRN
Status: DISCONTINUED | OUTPATIENT
Start: 2021-05-04 | End: 2021-05-08

## 2021-05-04 RX ORDER — SODIUM CHLORIDE, SODIUM LACTATE, POTASSIUM CHLORIDE, CALCIUM CHLORIDE 600; 310; 30; 20 MG/100ML; MG/100ML; MG/100ML; MG/100ML
INJECTION, SOLUTION INTRAVENOUS CONTINUOUS
Status: DISCONTINUED | OUTPATIENT
Start: 2021-05-04 | End: 2021-05-08

## 2021-05-04 RX ORDER — DOCUSATE SODIUM 100 MG/1
100 CAPSULE, LIQUID FILLED ORAL 2 TIMES DAILY
Status: DISCONTINUED | OUTPATIENT
Start: 2021-05-04 | End: 2021-05-08

## 2021-05-04 RX ORDER — LABETALOL HYDROCHLORIDE 5 MG/ML
40 INJECTION, SOLUTION INTRAVENOUS ONCE AS NEEDED
Status: COMPLETED | OUTPATIENT
Start: 2021-05-04 | End: 2021-05-06

## 2021-05-04 RX ORDER — LABETALOL HYDROCHLORIDE 5 MG/ML
80 INJECTION, SOLUTION INTRAVENOUS ONCE AS NEEDED
Status: DISCONTINUED | OUTPATIENT
Start: 2021-05-04 | End: 2021-05-08

## 2021-05-04 RX ORDER — ACETAMINOPHEN 500 MG
1000 TABLET ORAL ONCE
Status: COMPLETED | OUTPATIENT
Start: 2021-05-04 | End: 2021-05-04

## 2021-05-04 RX ORDER — ACETAMINOPHEN 500 MG
TABLET ORAL
Status: DISPENSED
Start: 2021-05-04 | End: 2021-05-05

## 2021-05-04 RX ORDER — DIPHENHYDRAMINE HYDROCHLORIDE 50 MG/ML
INJECTION INTRAMUSCULAR; INTRAVENOUS
Status: DISPENSED
Start: 2021-05-04 | End: 2021-05-05

## 2021-05-04 RX ORDER — LABETALOL HYDROCHLORIDE 5 MG/ML
20 INJECTION, SOLUTION INTRAVENOUS ONCE AS NEEDED
Status: COMPLETED | OUTPATIENT
Start: 2021-05-04 | End: 2021-05-06

## 2021-05-04 RX ORDER — ZOLPIDEM TARTRATE 5 MG/1
5 TABLET ORAL NIGHTLY PRN
Status: DISCONTINUED | OUTPATIENT
Start: 2021-05-04 | End: 2021-05-08

## 2021-05-04 RX ORDER — CALCIUM CARBONATE 200(500)MG
1000 TABLET,CHEWABLE ORAL
Status: DISCONTINUED | OUTPATIENT
Start: 2021-05-04 | End: 2021-05-08

## 2021-05-04 NOTE — TELEPHONE ENCOUNTER
Incoming calling received from [address]. Recommendations for patient:     1. Repeat bile acids lab draw. Order entered for patient per AJB verbal order. 2. Patient can try otc Zyrtec  3.  Patient needs follow up PN appt in office this week.     Can send to derm

## 2021-05-04 NOTE — TELEPHONE ENCOUNTER
Regarding: RE: Other  ----- Message from Persian Mock sent at 5/4/2021  1:15 PM CDT -----       ----- Message sent from Dontrell Miles RN to Selena Menchaca at 5/3/2021 10:38 AM -----   Laney Pierson,    I just tried to reach you but got your voicemail.  Ca liver enzymes so that we can rule out cholestasis as a factor? Additionally, what can I put on my body for itch relief? Is it safe to use calamine lotion or topical cortisteroid creams?     Thank you,  Joyce Bello

## 2021-05-04 NOTE — TELEPHONE ENCOUNTER
----- Message from Sharif Ugalde sent at 5/4/2021  4:18 PM CDT -----  Regarding: Other  Contact: [phone]  Hi Dr. Ananya Hein,    For the past 2 days, my blood pressure has consistently over 150. I've also been experiencing severe fatigue and headaches.

## 2021-05-04 NOTE — TELEPHONE ENCOUNTER
Advised pt if she is having Blood Pressures with systolics over 822'B, swelling in her hands, feet and face and seeing spots and having headaches she needs to go to the [facility] now for evaluation. Pt voices understanding. Report given to [facility].

## 2021-05-04 NOTE — PROGRESS NOTES
Pt is a 28year old female admitted to TR2/TR2-A.    Patient presents with:  R/o Pih: elevated blood pressures at home, severe headache unrelieved by medication, spots in vision, nausea worsened, per patient swelling worse than usual    Assessment:

## 2021-05-05 ENCOUNTER — HOSPITAL ENCOUNTER (INPATIENT)
Dept: PERINATAL CARE | Facility: HOSPITAL | Age: 36
Discharge: HOME | End: 2021-05-05
Attending: OBSTETRICS & GYNECOLOGY
Payer: COMMERCIAL

## 2021-05-05 PROCEDURE — 76816 OB US FOLLOW-UP PER FETUS: CPT | Performed by: OBSTETRICS & GYNECOLOGY

## 2021-05-05 PROCEDURE — 59025 FETAL NON-STRESS TEST: CPT | Performed by: OBSTETRICS & GYNECOLOGY

## 2021-05-05 PROCEDURE — 99233 SBSQ HOSP IP/OBS HIGH 50: CPT | Performed by: OBSTETRICS & GYNECOLOGY

## 2021-05-05 RX ORDER — ONDANSETRON 2 MG/ML
4 INJECTION INTRAMUSCULAR; INTRAVENOUS EVERY 6 HOURS PRN
Status: DISCONTINUED | OUTPATIENT
Start: 2021-05-05 | End: 2021-05-08

## 2021-05-05 RX ORDER — ACETAMINOPHEN 500 MG
TABLET ORAL
Status: DISPENSED
Start: 2021-05-05 | End: 2021-05-06

## 2021-05-05 RX ORDER — ONDANSETRON 2 MG/ML
INJECTION INTRAMUSCULAR; INTRAVENOUS
Status: COMPLETED
Start: 2021-05-05 | End: 2021-05-05

## 2021-05-05 RX ORDER — ACETAMINOPHEN 500 MG
TABLET ORAL
Status: COMPLETED
Start: 2021-05-05 | End: 2021-05-05

## 2021-05-05 NOTE — CONSULTS
[address] Patient Status:  Inpatient    1985 MRN [id number]   Location [facility] Attending Reena Goyal MD   Hosp Day # 1 PCP Rachelle Shabazz MD, MD Roberto Spear History:  OB History    Para Term  AB Living   2 1 1 0 0 1   SAB TAB Ectopic Multiple Live Births           1      # Outcome Date GA Lbr Skip/2nd Weight Sex Delivery Anes PTL Lv   2 Current            1 Term 08 40w0d  5 lb 6 oz (2.438 kg .0 05/05/2021    CREATSERUM 1.05 05/04/2021    BUN 14 05/04/2021     05/04/2021    K 4.0 05/04/2021     05/04/2021    CO2 20.0 05/04/2021     05/04/2021    CA 9.8 05/04/2021    ALB 2.3 05/04/2021    ALKPHO 93 05/04/2021    BILT 0. previous pregnancy,  Age >40 years or <18 years,  Family history of pregnancy-induced hypertension, Chronic hypertension,  Chronic renal disease,  Antiphospholipid antibody syndrome or inherited thrombophilia, Vascular or connective tissue disease, Diabete late in gestation in primigravid women and followed by a normotensive pregnancy does not appear to be associated with increased remote cardiovascular risk. For this reason screening for the inherited thrombophilia may be worth while.                We disc respiratory distress syndrome. The best approach is early delivery, the timing of which should be guided by the patients symptoms (mostly pruritus), the gestational age, and whether the cervix is favorable.  In most patients delivery should be acc

## 2021-05-05 NOTE — CONSULTS
[facility]    Report of Consultation    Luann Jose Patient Status:  Inpatient    1985 MRN [id number]   Location [address]  Attending Marlen Brantley, [address] Day # 1 PCP ALPHONSO Draper Allergies    Medications:    Current Facility-Administered Medications:   •  ondansetron HCl (ZOFRAN) injection 4 mg, 4 mg, Intravenous, Q6H PRN  •  Insulin Regular Human (NOVOLIN R) 100 UNIT/ML injection 2 Units, 2 Units, Subcutaneous, Once  •  lactated r Episode of recurrent major depressive disorder [facility])     Musculoskeletal chest pain     Chronic post-traumatic stress disorder (PTSD)     Thoughts of self harm     Supervision of elderly multigravida, unspecified trimester     Previous  delivery, a

## 2021-05-05 NOTE — PROGRESS NOTES
[facility] HOSP - [facility]    OB/GYNE Progress Note      Sukumar Fat Patient Status:  Inpatient    1985 MRN [id number]   [address]  Attending Tim Wolfe MD   Hosp Day # 1 PCP Nic Salgado MD potential etiology for headache,  Regular insulin ordered and await response. abd gravid, nontender. Ext nt,  scds ordered.      Labs:  ALT (SGPT)  Order: [id number]  Collected:  5/5/2021  7:34 AM Status:  Final result   0 Result Notes  Component   Re TREPONEMALAB Negative 04/02/2021    RAPIDHIVSCRN Nonreactive 04/02/2021    ABO A 05/04/2021    RH Positive 05/04/2021    WBC 11.9 (H) 05/05/2021    HGB 10.7 (L) 05/05/2021    HCT 32.0 (L) 05/05/2021    .0 05/05/2021    CREATSERUM 1.05 (H) 05/04/2021

## 2021-05-05 NOTE — H&P
[address] Patient Status:  Inpatient    1985 MRN [id number]   Location 9 [facility] Attending Caprice Lraa MD   Hosp Day # 0 PCP Everette Contreras MD, MD Packs/day: 0.25        Years: 16.00        Pack years: 4      Smokeless tobacco: Never Used    Alcohol use: Not Currently      Comment: hx alcohol use disorder       Allergies/Medications:    Allergies:   No Known Allergies  Medications:  acetaminophen 5 05/04/2021     05/04/2021    K 4.0 05/04/2021     05/04/2021    CO2 20.0 (L) 05/04/2021     (H) 05/04/2021    CA 9.8 05/04/2021    ALB 2.3 (L) 05/04/2021    ALKPHO 93 05/04/2021    BILT 0.2 05/04/2021    TP 6.5 05/04/2021    AST 46 (H) 0

## 2021-05-05 NOTE — PAYOR COMM NOTE
--------------  ADMISSION REVIEW     Payor: [employer] [facility]  Subscriber #:  [id number]  Authorization Number: [id number]    Admit date: 5/4/21  Admit time:  8:05 PM       Admitting Physician: Marvin Kerr MD  Attending Physician:  Jose Guadalupe Hanna Diagnosis Date   •     •     • Chronic post-traumatic stress disorder (PTSD)    • Decorative tattoo    •     •       Past Social History:   Past Surgical History:   Procedure Laterality Date   • BREAST PROSTHESIS, SILICONE OR EQUAL, WITH INTEGRAL ADHESIVE no murmur, click, rub or gallop  Abdomen: FHT present  Fetal Surveillance:  Reactive nst cat 1  Sterile vaginal exam: deferred  Cervix: no lesions or cervical motion tenderness and not digitally examined    Results:     Lab Results   Component Value Date the patient. Pre-admission, admission, and post admission procedures and expectations were discussed in detail. All questions answered, all appropriate consents will be signed at the Hospital. Admission is planned for today.    Continue present managem accuchecks were ordered last night,  fbs was done by staff this morning and 180,  2 u regular ordered, and I called and spoke with dr Edward Ruiz endocrinology who is coming to see pt this morning to manage pt's diabetes.   Labs done this am shows lfts normal thi Order: [id number]  Collected:  5/5/2021  7:34 AM Status:  Final result   0 Result Notes  Component   Ref Range & Units 5/5/21  7:34 AM   AST   15 - 37 U/L 34    Resulting Agency [facility])          Specimen Collected: 05/05/21  7:34 AM Last Resulted 9.8 05/04/2021     ALB 2.3 (L) 05/04/2021     ALKPHO 93 05/04/2021     BILT 0.2 05/04/2021     TP 6.5 05/04/2021     AST 34 05/05/2021     ALT 32 05/05/2021     TSH 1.700 02/23/2021         Lab Results   Component Value Date     COLORUR Yellow 04/01/2021

## 2021-05-05 NOTE — DIABETES ED
[facility] HOSP - [facility]    Diabetes Education  Note    Fanta Freitas Patient Status:  Inpatient   1985 MRN [id number]  Location [address]  Attending Mary Huertas MD  Hosp Day # 1 PCP Sophia Jha MD, provided.       Recommendations:  Patient to self-administer all insulin doses with RN supervision       Enedina Muir RN  Diabetes Educator  5/5/2021  4:01 PM

## 2021-05-06 ENCOUNTER — APPOINTMENT (OUTPATIENT)
Dept: ENDOCRINOLOGY | Facility: HOSPITAL | Age: 36
End: 2021-05-06
Attending: OBSTETRICS & GYNECOLOGY
Payer: COMMERCIAL

## 2021-05-06 ENCOUNTER — TELEPHONE (OUTPATIENT)
Dept: OBGYN CLINIC | Facility: CLINIC | Age: 36
End: 2021-05-06

## 2021-05-06 PROCEDURE — 99222 1ST HOSP IP/OBS MODERATE 55: CPT | Performed by: INTERNAL MEDICINE

## 2021-05-06 PROCEDURE — 3078F DIAST BP <80 MM HG: CPT | Performed by: INTERNAL MEDICINE

## 2021-05-06 PROCEDURE — 3075F SYST BP GE 130 - 139MM HG: CPT | Performed by: INTERNAL MEDICINE

## 2021-05-06 RX ORDER — ACETAMINOPHEN 325 MG/1
650 TABLET ORAL EVERY 6 HOURS PRN
Status: DISCONTINUED | OUTPATIENT
Start: 2021-05-06 | End: 2021-05-08

## 2021-05-06 RX ORDER — ACETAMINOPHEN 325 MG/1
TABLET ORAL
Status: COMPLETED
Start: 2021-05-06 | End: 2021-05-06

## 2021-05-06 RX ORDER — BUPROPION HYDROCHLORIDE 300 MG/1
300 TABLET ORAL DAILY
Status: DISCONTINUED | OUTPATIENT
Start: 2021-05-06 | End: 2021-05-08

## 2021-05-06 RX ORDER — DEXTROSE MONOHYDRATE 25 G/50ML
50 INJECTION, SOLUTION INTRAVENOUS
Status: DISCONTINUED | OUTPATIENT
Start: 2021-05-06 | End: 2021-05-08

## 2021-05-06 RX ORDER — CHOLECALCIFEROL (VITAMIN D3) 25 MCG
1 TABLET,CHEWABLE ORAL DAILY
Status: DISCONTINUED | OUTPATIENT
Start: 2021-05-06 | End: 2021-05-08

## 2021-05-06 NOTE — PROGRESS NOTES
RN confirming with Dr. Radha Walsh, pt to receive 10 units of novolg before meals and Dr. Radha Walsh adding Levemir dose for 1700 today q day.

## 2021-05-06 NOTE — TELEPHONE ENCOUNTER
Pts partner dropped off STD forms to be filled out. TED signed, payment collected. Would like to  at [facility] once completed.

## 2021-05-06 NOTE — PLAN OF CARE
Problem: ANTEPARTUM/LABOR and DELIVERY  Goal: Maintain pregnancy as long as maternal and/or fetal condition is stable  Description: INTERVENTIONS:  - Maternal surveillance  - Fetal surveillance  - Monitor uterine activity  - Medications as ordered  - Bed range  - Assess barriers to adequate nutritional intake and initiate nutrition consult as needed  - Instruct patient on self management of diabetes  5/6/2021 0227 by Shannon Walsh, RN  Outcome: Progressing  5/6/2021 0226 by Shannon Walsh, RN  Outcome: Prog

## 2021-05-06 NOTE — PROGRESS NOTES
RN received call from Astrid Cárdenas, diabetic educator. Per diabetic educator, insulin drip is to keep pt's glucose between 110-180. If pt has difficulty meeting those parameters, RN to notify Dr. Alona Robison.

## 2021-05-06 NOTE — PLAN OF CARE
Problem: ANTEPARTUM/LABOR and DELIVERY  Goal: Maintain pregnancy as long as maternal and/or fetal condition is stable  Description: INTERVENTIONS:  - Maternal surveillance  - Fetal surveillance  - Monitor uterine activity  - Medications as ordered  - Bed goal  Description: INTERVENTIONS:  - Encourage pt to monitor pain and request assistance  - Assess pain using appropriate pain scale  - Administer analgesics based on type and severity of pain and evaluate response  - Implement non-pharmacological measures

## 2021-05-06 NOTE — NST
Nonstress Test   Patient: Issa Camara    Gestation: 33w2d    NST:       Variability: Moderate           Accelerations: Yes           Decelerations: None            Baseline: 140 BPM           Uterine Irritability: No           Contractions: Not prese

## 2021-05-06 NOTE — PAYOR COMM NOTE
--------------  CONTINUED STAY REVIEW    Payor: [employer]  Subscriber #:  [id number]  Authorization Number: [id number]    Admit date: 5/4/21  Admit time:  8:05 PM    FAXING CLINICAL UPDATE FOR 5/6/21 5/6/21   Assessment & Plan:     Pregnancy  IUP-33 2/ 5/5/2021 1547 Given 1,000 mg Oral       betamethasone sod phos & acet (CELESTONE) 6 (3-3) MG/ML injection 12 mg     Date Action Dose Route     5/5/2021 2102 Given 12 mg Intramuscular (Other)       buPROPion HCl ER (XL) (WELLBUTRIN XL) 300 MG 24 hr tab 300 Units Subcutaneous (Right Upper Arm)       Labetalol HCl (TRANDATE) injection 20 mg     Date Action Dose Route     5/6/2021 1229 Given 20 mg Intravenous       prenatal multivitamin plus DHA (PNV with DHA) cap 1 capsule     Date Action Dose Route     5/6/20

## 2021-05-06 NOTE — PROGRESS NOTES
RN notified Dr. Luis Carlos Blanchard, endocrinologist pt's Imelda Fraser noted to be 217 mg/dl before lunch. T.O. received for pt to receive 10 units novolog before meals and RN to administer 12 mg/dl novolog total subcutaneous now due to pt's recent accucheck.

## 2021-05-06 NOTE — PROGRESS NOTES
[facility]    OB/GYNE Progress Note      Maryam Francis Patient Status:  Inpatient    1985 MRN [id number]   Location [address] Attending Lucia Yoon MD   Hosp Day # 2 PCP Ericka Bass MD CREATSERUM 1.05 (H) 05/04/2021    BUN 14 05/04/2021     05/04/2021    K 4.0 05/04/2021     05/04/2021    CO2 20.0 (L) 05/04/2021     (H) 05/04/2021    CA 9.8 05/04/2021    ALB 2.3 (L) 05/04/2021    ALKPHO 93 05/04/2021    BILT 0.2 05/

## 2021-05-07 ENCOUNTER — APPOINTMENT (OUTPATIENT)
Dept: MRI IMAGING | Facility: HOSPITAL | Age: 36
End: 2021-05-07
Attending: OBSTETRICS & GYNECOLOGY
Payer: COMMERCIAL

## 2021-05-07 PROCEDURE — 99233 SBSQ HOSP IP/OBS HIGH 50: CPT | Performed by: INTERNAL MEDICINE

## 2021-05-07 PROCEDURE — 99223 1ST HOSP IP/OBS HIGH 75: CPT | Performed by: OTHER

## 2021-05-07 PROCEDURE — 70544 MR ANGIOGRAPHY HEAD W/O DYE: CPT | Performed by: OBSTETRICS & GYNECOLOGY

## 2021-05-07 RX ORDER — METOCLOPRAMIDE HYDROCHLORIDE 5 MG/ML
10 INJECTION INTRAMUSCULAR; INTRAVENOUS EVERY 12 HOURS
Status: DISCONTINUED | OUTPATIENT
Start: 2021-05-07 | End: 2021-05-08

## 2021-05-07 RX ORDER — DIPHENHYDRAMINE HYDROCHLORIDE 50 MG/ML
25 INJECTION INTRAMUSCULAR; INTRAVENOUS EVERY 12 HOURS
Status: DISCONTINUED | OUTPATIENT
Start: 2021-05-07 | End: 2021-05-08

## 2021-05-07 NOTE — TELEPHONE ENCOUNTER
Dr. Koo Must,     Please sign off on form: Disab  -Highlight the patient and hit \"Chart\" button.   -In Chart Review, w/in the Encounter tab - click 1 time on the Telephone call encounter for 5/6/21 Scroll down the telephone encounter.  -Click \"scan on\" bl

## 2021-05-07 NOTE — DIABETES ED
[facility] HOSP - [facility]    Diabetes Education  Note    Wesley Homans Patient Status:  Inpatient   1985 MRN [id number]  [address]  Attending Sola Rodriguez MD  Hosp Day # 3 PCP Octaviano Britton MD,

## 2021-05-07 NOTE — PROGRESS NOTES
[facility]    OB Antepartum Note    Oziel Aylin Patient Status:  Inpatient    1985 MRN [id number]   Location [address]  Attending Efrain Bernabe MD   Hosp Day # 3 PCP Mann Duggan MD, MD for severe preeclampsia. She has been having normal movements and denies any cramping contractions or bleeding. Pt currently states she feels unstable with walking. She has been up to void.   Prenatal Care   Prenatal Care with [facility] Bifida (Required questions in OE to answer )        QUAD/AFP - Interpretation        Free Fetal DNA         Genetic testing        Genetic testing        Genetic testing        GC DNA  Negative  Negative 12/08/20 1911    Chlamydia DNA  Negative  Negative 1 HIV Combo        GC DNA        Chlamydia DNA              Optional Labs     Test Value Reference Range Date Time    HgB A1c  7.4 % <5.7 05/04/21 1849    HGB Electrophoresis        Varicella Zoster        Cystic Fibrosis-Old         Cystic Fibrosis[32] (Req Family History   Problem Relation Age of Onset   • Stroke Father    • Personality Disorder Father    • Diabetes Mother    • Personality Disorder Mother    • Diabetes Maternal Grandmother    • Stroke Maternal Grandfather      Social History: Social History deferred    Results:   @  Recent Results (from the past 24 hour(s))   POCT GLUCOSE    Collection Time: 05/06/21 11:25 AM   Result Value Ref Range    POC Glucose  215 (H) 70 - 99 mg/dL   POCT GLUCOSE    Collection Time: 05/06/21  2:09 PM   Result Value Ref Component Value Date    TREPONEMALAB Negative 04/02/2021    RAPIDHIVSCRN Nonreactive 04/02/2021    HIV Non-Reactive 11/20/2020    ABO A 05/04/2021    RH Positive 05/04/2021    WBC 15.7 (H) 05/06/2021    HGB 10.6 (L) 05/06/2021    HCT 32.4 (L) 05/06/2021

## 2021-05-07 NOTE — PROGRESS NOTES
Progress note:  I spoke with dr Skip Montez who saw the pt in consult and she adivsed a neurology consult,  Neurology on call Dr. Javon Hirsch paged//called by cell phone by answering service and he is coming to see her now. rn notified.

## 2021-05-07 NOTE — PAYOR COMM NOTE
--------------  CONTINUED STAY REVIEW    Payor: [employer]MM LYON  Subscriber #:  [id number]  Authorization Number: [id number]    Admit date: 5/4/21  Admit time:  8:05 PM    FAXING CLINICAL UPDATE FOR 5/7/21 5/7/21  OB Antepartum Note           Lidia Garza Result Value Ref Range     POC Glucose  150 (H) 70 - 99 mg/dL      WBC 15.7 (H) 05/06/2021     HGB 10.6 (L) 05/06/2021     HCT 32.4 (L) 05/06/2021     .0 05/06/2021      AST 29 05/06/2021     ALT 29 05/06/2021     Assessment/Plan:    Lisa Parma Units Subcutaneous (Left Buttocks)     5/6/2021 2228 Given 10 Units Subcutaneous (Left Upper Arm)     5/6/2021 1615 Given 10 Units Subcutaneous (Right Buttocks)     5/6/2021 1420 Given 10 Units Subcutaneous (Right Leg)     5/6/2021 1130 Given 10 Units Subc

## 2021-05-07 NOTE — CONSULTS
[facility]    Maternal Fetal Medicine Progress Note    Fanta Freitas Patient Status:  Inpatient    1985 MRN [id number]   Location [address]  Attending [address] [place] [address] Day # 3 PCP RENETTA edema in lower abdomen   Musculoskeletal:         General: Swelling present. Comments: 1+ bilateral edema in calves. No edema in feet. Neurological:      Mental Status: She is alert and oriented to person, place, and time.    Skin:     General: Skin elevated blood pressures and to control diabetes. Elevated glucose could be contributing to her headache. 2. Consult neurology for evaluation of headache and visual changes.   3. If the headache is thought to be due to neurologic symptoms other than preec

## 2021-05-07 NOTE — CONSULTS
[facility]    Report of Consultation    Radha Salinas Patient Status:  Inpatient    1985 MRN [id number]   Location [address]  Attending Magdalene Ivory MD   Hosp Day # 3 PCP Srinivas Doe MD, Personality Disorder Mother    • Diabetes Maternal Grandmother    • Stroke Maternal Grandfather        Social History  Patient Guardians:  Not on file    Other Topics            Concern    None on file    Social History Narrative    None on file infusion, , Intravenous, Continuous  acetaminophen (TYLENOL EXTRA STRENGTH) tab 500 mg, 500 mg, Oral, Q6H PRN   Or  acetaminophen (TYLENOL EXTRA STRENGTH) tab 1,000 mg, 1,000 mg, Oral, Q6H PRN  zolpidem (AMBIEN) tab 5 mg, 5 mg, Oral, Nightly PRN  docusate process intact  Memory intact- recent and remote  Mood intact  Fund of knowledge appropriate for education and age    Language intact including: comprehension, naming, repetition, vocabulary    Cranial Nerves:  II.- Visual fields full to confrontation  III will avoid Depakote, Solu-Medrol, avoid DHE    Thank you for allowing me to participate in the care of your patient.     Brea Pagan  5/7/2021

## 2021-05-07 NOTE — PROGRESS NOTES
Endocrine Update from Yesterday    5/6 - BG levels increased significantly after 2nd dose of betamethasone. Initially attempted to correct BG levels with Novolog doses approximately every 2-3 hours.   Levemir increased to 16 units subcutaneous QAM; 20 unit

## 2021-05-07 NOTE — PROGRESS NOTES
Pt's mother is here and would like to give her a bed bath. Monitors removed shortly for her to bathe.

## 2021-05-07 NOTE — PROGRESS NOTES
MRV normal.      Spoke with dr Lance Barba, he already placed orders for his treatment plan for migraine headache. Called rn.

## 2021-05-07 NOTE — PROGRESS NOTES
Pt c/o of severe headache saying \"it feels like an elephant is sitting on my head\" and increasing blurry vision. States tylenol she has been getting doesn't help headache at all.  Sig other states he has noticed that she seems unsteady when she is up walk

## 2021-05-07 NOTE — PROGRESS NOTES
Informed pt that we needed to get her back on the monitor and would now be on continuous monitoring and she said she needed 15 minutes so she could get up and go to the bathroom. Told her I would be back at 1045.

## 2021-05-08 ENCOUNTER — ANESTHESIA EVENT (OUTPATIENT)
Dept: OBGYN UNIT | Facility: HOSPITAL | Age: 36
End: 2021-05-08
Payer: COMMERCIAL

## 2021-05-08 ENCOUNTER — ANESTHESIA (OUTPATIENT)
Dept: OBGYN UNIT | Facility: HOSPITAL | Age: 36
End: 2021-05-08
Payer: COMMERCIAL

## 2021-05-08 PROCEDURE — 59025 FETAL NON-STRESS TEST: CPT | Performed by: OBSTETRICS & GYNECOLOGY

## 2021-05-08 PROCEDURE — 59514 CESAREAN DELIVERY ONLY: CPT | Performed by: OBSTETRICS & GYNECOLOGY

## 2021-05-08 PROCEDURE — 59510 CESAREAN DELIVERY: CPT | Performed by: OBSTETRICS & GYNECOLOGY

## 2021-05-08 RX ORDER — FAMOTIDINE 20 MG/1
20 TABLET ORAL ONCE
Status: COMPLETED | OUTPATIENT
Start: 2021-05-08 | End: 2021-05-08

## 2021-05-08 RX ORDER — NALBUPHINE HCL 10 MG/ML
2.5 AMPUL (ML) INJECTION EVERY 4 HOURS PRN
Status: ACTIVE | OUTPATIENT
Start: 2021-05-08 | End: 2021-05-09

## 2021-05-08 RX ORDER — DEXAMETHASONE SODIUM PHOSPHATE 4 MG/ML
VIAL (ML) INJECTION AS NEEDED
Status: DISCONTINUED | OUTPATIENT
Start: 2021-05-08 | End: 2021-05-08 | Stop reason: SURG

## 2021-05-08 RX ORDER — MISOPROSTOL 200 UG/1
TABLET ORAL
Status: DISPENSED
Start: 2021-05-08 | End: 2021-05-09

## 2021-05-08 RX ORDER — GABAPENTIN 300 MG/1
300 CAPSULE ORAL EVERY 8 HOURS PRN
Status: DISCONTINUED | OUTPATIENT
Start: 2021-05-08 | End: 2021-05-12

## 2021-05-08 RX ORDER — ONDANSETRON 2 MG/ML
4 INJECTION INTRAMUSCULAR; INTRAVENOUS EVERY 6 HOURS PRN
Status: DISCONTINUED | OUTPATIENT
Start: 2021-05-08 | End: 2021-05-12

## 2021-05-08 RX ORDER — DEXTROSE, SODIUM CHLORIDE, SODIUM LACTATE, POTASSIUM CHLORIDE, AND CALCIUM CHLORIDE 5; .6; .31; .03; .02 G/100ML; G/100ML; G/100ML; G/100ML; G/100ML
INJECTION, SOLUTION INTRAVENOUS CONTINUOUS
Status: DISCONTINUED | OUTPATIENT
Start: 2021-05-08 | End: 2021-05-12

## 2021-05-08 RX ORDER — METOCLOPRAMIDE 10 MG/1
10 TABLET ORAL ONCE
Status: COMPLETED | OUTPATIENT
Start: 2021-05-08 | End: 2021-05-08

## 2021-05-08 RX ORDER — HYDROMORPHONE HYDROCHLORIDE 1 MG/ML
0.6 INJECTION, SOLUTION INTRAMUSCULAR; INTRAVENOUS; SUBCUTANEOUS
Status: ACTIVE | OUTPATIENT
Start: 2021-05-08 | End: 2021-05-09

## 2021-05-08 RX ORDER — METOCLOPRAMIDE 10 MG/1
10 TABLET ORAL 2 TIMES DAILY
Status: DISCONTINUED | OUTPATIENT
Start: 2021-05-08 | End: 2021-05-08

## 2021-05-08 RX ORDER — HYDROCODONE BITARTRATE AND ACETAMINOPHEN 7.5; 325 MG/1; MG/1
2 TABLET ORAL EVERY 6 HOURS PRN
Status: ACTIVE | OUTPATIENT
Start: 2021-05-08 | End: 2021-05-09

## 2021-05-08 RX ORDER — DIPHENHYDRAMINE HCL 25 MG
25 CAPSULE ORAL 2 TIMES DAILY
Status: DISCONTINUED | OUTPATIENT
Start: 2021-05-08 | End: 2021-05-08

## 2021-05-08 RX ORDER — KETOROLAC TROMETHAMINE 30 MG/ML
30 INJECTION, SOLUTION INTRAMUSCULAR; INTRAVENOUS EVERY 6 HOURS
Status: COMPLETED | OUTPATIENT
Start: 2021-05-08 | End: 2021-05-09

## 2021-05-08 RX ORDER — TRISODIUM CITRATE DIHYDRATE AND CITRIC ACID MONOHYDRATE 500; 334 MG/5ML; MG/5ML
SOLUTION ORAL
Status: COMPLETED
Start: 2021-05-08 | End: 2021-05-08

## 2021-05-08 RX ORDER — IBUPROFEN 600 MG/1
600 TABLET ORAL EVERY 6 HOURS
Status: DISCONTINUED | OUTPATIENT
Start: 2021-05-09 | End: 2021-05-09

## 2021-05-08 RX ORDER — BUPIVACAINE HYDROCHLORIDE 7.5 MG/ML
INJECTION, SOLUTION INTRASPINAL
Status: COMPLETED | OUTPATIENT
Start: 2021-05-08 | End: 2021-05-08

## 2021-05-08 RX ORDER — SODIUM PHOSPHATE, DIBASIC AND SODIUM PHOSPHATE, MONOBASIC 7; 19 G/133ML; G/133ML
1 ENEMA RECTAL ONCE AS NEEDED
Status: DISCONTINUED | OUTPATIENT
Start: 2021-05-08 | End: 2021-05-12

## 2021-05-08 RX ORDER — CALCIUM GLUCONATE 94 MG/ML
1 INJECTION, SOLUTION INTRAVENOUS ONCE AS NEEDED
Status: DISCONTINUED | OUTPATIENT
Start: 2021-05-08 | End: 2021-05-12

## 2021-05-08 RX ORDER — CARBOPROST TROMETHAMINE 250 UG/ML
INJECTION, SOLUTION INTRAMUSCULAR
Status: DISPENSED
Start: 2021-05-08 | End: 2021-05-09

## 2021-05-08 RX ORDER — METOCLOPRAMIDE 10 MG/1
TABLET ORAL
Status: COMPLETED
Start: 2021-05-08 | End: 2021-05-08

## 2021-05-08 RX ORDER — SODIUM CHLORIDE, SODIUM LACTATE, POTASSIUM CHLORIDE, CALCIUM CHLORIDE 600; 310; 30; 20 MG/100ML; MG/100ML; MG/100ML; MG/100ML
INJECTION, SOLUTION INTRAVENOUS CONTINUOUS PRN
Status: DISCONTINUED | OUTPATIENT
Start: 2021-05-08 | End: 2021-05-08 | Stop reason: SURG

## 2021-05-08 RX ORDER — NALOXONE HYDROCHLORIDE 0.4 MG/ML
0.08 INJECTION, SOLUTION INTRAMUSCULAR; INTRAVENOUS; SUBCUTANEOUS
Status: ACTIVE | OUTPATIENT
Start: 2021-05-08 | End: 2021-05-09

## 2021-05-08 RX ORDER — METOCLOPRAMIDE HYDROCHLORIDE 5 MG/ML
10 INJECTION INTRAMUSCULAR; INTRAVENOUS ONCE
Status: COMPLETED | OUTPATIENT
Start: 2021-05-08 | End: 2021-05-08

## 2021-05-08 RX ORDER — CEFAZOLIN SODIUM/WATER 2 G/20 ML
2 SYRINGE (ML) INTRAVENOUS ONCE
Status: COMPLETED | OUTPATIENT
Start: 2021-05-08 | End: 2021-05-08

## 2021-05-08 RX ORDER — ONDANSETRON 2 MG/ML
INJECTION INTRAMUSCULAR; INTRAVENOUS AS NEEDED
Status: DISCONTINUED | OUTPATIENT
Start: 2021-05-08 | End: 2021-05-08 | Stop reason: SURG

## 2021-05-08 RX ORDER — POLYETHYLENE GLYCOL 3350 17 G/17G
17 POWDER, FOR SOLUTION ORAL DAILY PRN
Status: DISCONTINUED | OUTPATIENT
Start: 2021-05-08 | End: 2021-05-12

## 2021-05-08 RX ORDER — CARBOPROST TROMETHAMINE 250 UG/ML
INJECTION, SOLUTION INTRAMUSCULAR AS NEEDED
Status: DISCONTINUED | OUTPATIENT
Start: 2021-05-08 | End: 2021-05-08 | Stop reason: SURG

## 2021-05-08 RX ORDER — HYDROMORPHONE HYDROCHLORIDE 1 MG/ML
0.4 INJECTION, SOLUTION INTRAMUSCULAR; INTRAVENOUS; SUBCUTANEOUS
Status: ACTIVE | OUTPATIENT
Start: 2021-05-08 | End: 2021-05-09

## 2021-05-08 RX ORDER — SODIUM CHLORIDE, SODIUM LACTATE, POTASSIUM CHLORIDE, CALCIUM CHLORIDE 600; 310; 30; 20 MG/100ML; MG/100ML; MG/100ML; MG/100ML
10 INJECTION, SOLUTION INTRAVENOUS CONTINUOUS
Status: DISCONTINUED | OUTPATIENT
Start: 2021-05-08 | End: 2021-05-12

## 2021-05-08 RX ORDER — ACETAMINOPHEN 325 MG/1
650 TABLET ORAL EVERY 6 HOURS PRN
Status: ACTIVE | OUTPATIENT
Start: 2021-05-08 | End: 2021-05-09

## 2021-05-08 RX ORDER — PROCHLORPERAZINE EDISYLATE 5 MG/ML
5 INJECTION INTRAMUSCULAR; INTRAVENOUS ONCE AS NEEDED
Status: ACTIVE | OUTPATIENT
Start: 2021-05-08 | End: 2021-05-08

## 2021-05-08 RX ORDER — ACETAMINOPHEN 500 MG
1000 TABLET ORAL EVERY 6 HOURS
Status: DISCONTINUED | OUTPATIENT
Start: 2021-05-09 | End: 2021-05-11

## 2021-05-08 RX ORDER — MORPHINE SULFATE 1 MG/ML
INJECTION, SOLUTION EPIDURAL; INTRATHECAL; INTRAVENOUS
Status: COMPLETED | OUTPATIENT
Start: 2021-05-08 | End: 2021-05-08

## 2021-05-08 RX ORDER — MAGNESIUM OXIDE 400 MG (241.3 MG MAGNESIUM) TABLET
400 TABLET 2 TIMES DAILY WITH MEALS
Status: DISCONTINUED | OUTPATIENT
Start: 2021-05-08 | End: 2021-05-08

## 2021-05-08 RX ORDER — DIPHENHYDRAMINE HCL 25 MG
CAPSULE ORAL
Status: COMPLETED
Start: 2021-05-08 | End: 2021-05-08

## 2021-05-08 RX ORDER — DOCUSATE SODIUM 100 MG/1
100 CAPSULE, LIQUID FILLED ORAL
Status: DISCONTINUED | OUTPATIENT
Start: 2021-05-08 | End: 2021-05-12

## 2021-05-08 RX ORDER — AMMONIA INHALANTS 0.04 G/.3ML
0.3 INHALANT RESPIRATORY (INHALATION) AS NEEDED
Status: DISCONTINUED | OUTPATIENT
Start: 2021-05-08 | End: 2021-05-12

## 2021-05-08 RX ORDER — DIPHENHYDRAMINE HYDROCHLORIDE 50 MG/ML
12.5 INJECTION INTRAMUSCULAR; INTRAVENOUS EVERY 4 HOURS PRN
Status: DISPENSED | OUTPATIENT
Start: 2021-05-08 | End: 2021-05-09

## 2021-05-08 RX ORDER — MISOPROSTOL 100 UG/1
TABLET ORAL
Status: DISCONTINUED | OUTPATIENT
Start: 2021-05-08 | End: 2021-05-08

## 2021-05-08 RX ORDER — BISACODYL 10 MG
10 SUPPOSITORY, RECTAL RECTAL
Status: DISCONTINUED | OUTPATIENT
Start: 2021-05-08 | End: 2021-05-12

## 2021-05-08 RX ORDER — BUPROPION HYDROCHLORIDE 300 MG/1
300 TABLET ORAL DAILY
Status: DISCONTINUED | OUTPATIENT
Start: 2021-05-09 | End: 2021-05-12

## 2021-05-08 RX ORDER — HYDROCODONE BITARTRATE AND ACETAMINOPHEN 7.5; 325 MG/1; MG/1
1 TABLET ORAL EVERY 6 HOURS PRN
Status: ACTIVE | OUTPATIENT
Start: 2021-05-08 | End: 2021-05-09

## 2021-05-08 RX ORDER — ONDANSETRON 2 MG/ML
4 INJECTION INTRAMUSCULAR; INTRAVENOUS ONCE AS NEEDED
Status: ACTIVE | OUTPATIENT
Start: 2021-05-08 | End: 2021-05-08

## 2021-05-08 RX ORDER — SIMETHICONE 80 MG
80 TABLET,CHEWABLE ORAL 3 TIMES DAILY PRN
Status: DISCONTINUED | OUTPATIENT
Start: 2021-05-08 | End: 2021-05-12

## 2021-05-08 RX ORDER — FAMOTIDINE 10 MG/ML
20 INJECTION, SOLUTION INTRAVENOUS ONCE
Status: COMPLETED | OUTPATIENT
Start: 2021-05-08 | End: 2021-05-08

## 2021-05-08 RX ORDER — HALOPERIDOL 5 MG/ML
0.5 INJECTION INTRAMUSCULAR ONCE AS NEEDED
Status: ACTIVE | OUTPATIENT
Start: 2021-05-08 | End: 2021-05-08

## 2021-05-08 RX ORDER — DEXTROSE, SODIUM CHLORIDE, SODIUM LACTATE, POTASSIUM CHLORIDE, AND CALCIUM CHLORIDE 5; .6; .31; .03; .02 G/100ML; G/100ML; G/100ML; G/100ML; G/100ML
INJECTION, SOLUTION INTRAVENOUS CONTINUOUS
Status: DISCONTINUED | OUTPATIENT
Start: 2021-05-08 | End: 2021-05-08

## 2021-05-08 RX ORDER — DIPHENHYDRAMINE HCL 25 MG
25 CAPSULE ORAL EVERY 4 HOURS PRN
Status: ACTIVE | OUTPATIENT
Start: 2021-05-08 | End: 2021-05-09

## 2021-05-08 RX ADMIN — DEXAMETHASONE SODIUM PHOSPHATE 4 MG: 4 MG/ML VIAL (ML) INJECTION at 15:42:00

## 2021-05-08 RX ADMIN — BUPIVACAINE HYDROCHLORIDE 1.6 ML: 7.5 INJECTION, SOLUTION INTRASPINAL at 15:40:00

## 2021-05-08 RX ADMIN — MORPHINE SULFATE 0.3 MG: 1 INJECTION, SOLUTION EPIDURAL; INTRATHECAL; INTRAVENOUS at 15:40:00

## 2021-05-08 RX ADMIN — SODIUM CHLORIDE, SODIUM LACTATE, POTASSIUM CHLORIDE, CALCIUM CHLORIDE: 600; 310; 30; 20 INJECTION, SOLUTION INTRAVENOUS at 16:44:00

## 2021-05-08 RX ADMIN — CEFAZOLIN SODIUM/WATER 2 G: 2 G/20 ML SYRINGE (ML) INTRAVENOUS at 15:44:00

## 2021-05-08 RX ADMIN — ONDANSETRON 4 MG: 2 INJECTION INTRAMUSCULAR; INTRAVENOUS at 15:42:00

## 2021-05-08 RX ADMIN — SODIUM CHLORIDE, SODIUM LACTATE, POTASSIUM CHLORIDE, CALCIUM CHLORIDE: 600; 310; 30; 20 INJECTION, SOLUTION INTRAVENOUS at 15:29:00

## 2021-05-08 RX ADMIN — CARBOPROST TROMETHAMINE 250 MCG: 250 INJECTION, SOLUTION INTRAMUSCULAR at 16:25:00

## 2021-05-08 NOTE — PROGRESS NOTES
[facility]    Progress Note    Edith Downing Patient Status:  Inpatient    1985 MRN [id number]   Location [address]  Attending Marina Velasquez MD   Hosp Day # 3 PCP Ariane Murray MD, MD has never used smokeless tobacco. She reports previous alcohol use. She reports previous drug use. Drug: Cannabis.     Allergies:  No Known Allergies    Medications:    Current Facility-Administered Medications:   •  Insulin Aspart Pen (NOVOLOG) 100 UNIT/ML Intravenous, Continuous  •  acetaminophen (TYLENOL EXTRA STRENGTH) tab 500 mg, 500 mg, Oral, Q6H PRN **OR** acetaminophen (TYLENOL EXTRA STRENGTH) tab 1,000 mg, 1,000 mg, Oral, Q6H PRN  •  zolpidem (AMBIEN) tab 5 mg, 5 mg, Oral, Nightly PRN  •  docusate so antepartum     Back pain affecting pregnancy in second trimester     Vaginal bleeding     GBS bacteriuria      uterine contractions, antepartum, third trimester     Persistent headaches    1.  Gestational Diabetes  - Discussed importance of BG contro

## 2021-05-08 NOTE — PROGRESS NOTES
[facility] HOSP - [facility]    OB/GYNE Progress Note      Vickie Pulido Patient Status:  Inpatient    1985 MRN [id number]   Location [address] [facility] Attending Easton Whitt MD   Hosp Day # 4 PCP Dayna Dean MD and parameters were utilized for visualization of suspected pathology.  Images were performed without contrast.       FINDINGS: Superior sagittal sinus and inferior sagittal sinus, internal cerebral veins, vein of Kyrie, straight sinus, transverse and sigm 7 lb 13 oz  EFW by Kwasi (BPD-HC-AC-FL)  Page 2 of 2 for report of pa? ?ent SAIRA MENDEZ, RIVER 1985  Head / Face / Neck Biometry:  Cephalic index 5.67 59% Nicolaides   7.9 mm  CM 3.8 mm <1% Nicolaides  Extremi??es / Bony Struc Biometry:  FL / 05/07/2021    TSH 1.700 02/23/2021       Lab Results   Component Value Date    COLORUR Yellow 04/01/2021    CLARITY Hazy (A) 04/01/2021    SPECGRAVITY 1.023 04/01/2021    PROUR Negative 04/01/2021    GLUUR Negative 04/01/2021    KETUR Negative 04/01/2021 12/2017

## 2021-05-08 NOTE — L&D DELIVERY NOTE
Jennifer Rome, Girl [[id number]]    Labor Events     labor?: No   steroids?: Full Course  Antibiotics received during labor?: No  Antibiotics (enter # doses in comment): none  Rupture date/time: 2021 1614     Rupture type: AROM  Fluid co Removal  Appearance: Intact  Disposition: Pathology     Apgars    Living status: Living   Apgar Scoring Key:    0 1 2    Skin color Blue or pale Acrocyanotic Completely pink    Heart rate Absent <100 bpm >100 bpm    Reflex irritability No response Grimace Ruth Becky Girl [[id number]]   female    Infant Birthweight  Information for the patient's : Ruth Pena, Girl [[id number]]   7 lb 6 oz (3.345 kg)     Presentation Vertex [1]  Position          Apgars:  1 minute: 8               5 minutes: 9

## 2021-05-08 NOTE — ANESTHESIA PROCEDURE NOTES
Spinal Block    Date/Time: 5/8/2021 3:40 PM  Performed by: Eloise Woodall MD  Authorized by: Eloise Woodall MD       General Information and Staff    Start Time:  5/8/2021 3:36 PM  End Time:  5/8/2021 3:40 PM  Anesthesiologist:  Eloise Woodall MD  Performe

## 2021-05-08 NOTE — ANESTHESIA PREPROCEDURE EVALUATION
Anesthesia PreOp Note    HPI:     Lanny Hendricks is a 28year old female who presents for preoperative consultation requested by: Joseph Rdz MD    Date of Surgery: 2021    Procedure(s):  REPEAT  SECTION  Indication: Prior Uterine Surg Decorative tattoo    • Depression    • History of anorexia nervosa        Past Surgical History:   Procedure Laterality Date   • BREAST PROSTHESIS, SILICONE OR EQUAL, WITH INTEGRAL ADHESIVE Bilateral     saline implants    •              aceta Antonio Fontenot MD, 17 Units at 05/08/21 6079  magnesium sulfate 0.5 g in sodium chloride 0.9% 50 mL IVPB, 0.5 g, Intravenous, Q12H, Marvin Pickering MD, Last Rate: 50 mL/hr at 05/07/21 1907, 0.5 g at 05/07/21 1907  diphenhydrAMINE HCl (BENADRYL) Oral, Q6H PRN, Juan Guerrero MD   Or  acetaminophen (TYLENOL EXTRA STRENGTH) tab 1,000 mg, 1,000 mg, Oral, Q6H PRN, Olegario Valdez MD, 1,000 mg at 05/07/21 0086  zolpidem (AMBIEN) tab 5 mg, 5 mg, Oral, Nightly PRN, Easton Whitt MD, 5 m of Transportation (Medical):       Lack of Transportation (Non-Medical):   Physical Activity:       Days of Exercise per Week:       Minutes of Exercise per Session:   Stress:       Feeling of Stress :   Social Connections:       Frequency of Communication (PTSD), anxiety/panic attacks,  depression,       GI/Hepatic/Renal - negative ROS     Endo/Other    (+) diabetes mellitus,     Comments: Preeclampsia  Abdominal                Anesthesia Plan:   ASA:  2  Plan:   Spinal  Post-op Pain Management: Intrathecal

## 2021-05-08 NOTE — ANESTHESIA POSTPROCEDURE EVALUATION
Patient: Caitlin Calderon    Procedure Summary     Date: 21 Room / Location: [address] [place] [address] L+D OR  [facility] L+D OR    Anesthesia Start: 152 Anesthesia Stop:     Procedure: REPEAT  SECTION (N/A Abdomen) Diagnosis: (as as preop )    Surgeons: Misty Gordon

## 2021-05-09 PROCEDURE — 99233 SBSQ HOSP IP/OBS HIGH 50: CPT | Performed by: INTERNAL MEDICINE

## 2021-05-09 RX ORDER — ACETAMINOPHEN 500 MG
TABLET ORAL
Status: COMPLETED
Start: 2021-05-09 | End: 2021-05-09

## 2021-05-09 RX ORDER — ACETAMINOPHEN 500 MG
TABLET ORAL
Status: DISPENSED
Start: 2021-05-09 | End: 2021-05-10

## 2021-05-09 RX ORDER — DEXTROSE MONOHYDRATE 25 G/50ML
50 INJECTION, SOLUTION INTRAVENOUS
Status: DISCONTINUED | OUTPATIENT
Start: 2021-05-09 | End: 2021-05-12

## 2021-05-09 RX ORDER — HYDROCODONE BITARTRATE AND ACETAMINOPHEN 5; 325 MG/1; MG/1
1 TABLET ORAL EVERY 4 HOURS PRN
Status: DISCONTINUED | OUTPATIENT
Start: 2021-05-09 | End: 2021-05-10

## 2021-05-09 RX ORDER — IBUPROFEN 600 MG/1
TABLET ORAL
Status: COMPLETED
Start: 2021-05-09 | End: 2021-05-09

## 2021-05-09 RX ORDER — LORAZEPAM 0.5 MG/1
0.5 TABLET ORAL 2 TIMES DAILY PRN
Status: DISCONTINUED | OUTPATIENT
Start: 2021-05-09 | End: 2021-05-12

## 2021-05-09 NOTE — PROGRESS NOTES
[facility] HOSP - [facility]    OB/GYNE Progress Note      Issa Camara Patient Status:  Inpatient    1985 MRN [id number]   Location [address]  Attending Soha Mcmahan MD   Hosp Day # 5 PCP Jacinta Ceron MD complication     Pregnancy     Pelvic pain affecting pregnancy in second trimester, antepartum     Back pain affecting pregnancy in second trimester     Vaginal bleeding     GBS bacteriuria      uterine contractions, antepartum, third trimester

## 2021-05-09 NOTE — ANESTHESIA POST-OP FOLLOW-UP NOTE
[facility]  Anesthesiology Epidural Follow-up Note  2021    Patient name: Sukumar Pandey 28year old female  : 1985  MRN: [id number]    Diagnosis: No diagnosis found.     S/P:C/section  Pain treatment modality: Duramorph    C

## 2021-05-09 NOTE — PROGRESS NOTES
Patient continues to be tearful, requesting that RN discuss lab draws and advancing diet with provider. Writer discussed plan of care  with DR. Gilbert.   Due to acuity of medical condition provider states that it is in patients best interest to have labs

## 2021-05-09 NOTE — CM/SW NOTE
CM spoke w/RNReece who stated pt is groggy from IV Magnesium infusion to plan dc this evening the Mg. At that time pt will be encouraged to fill out EPDS. Per Guy Tay pt has been demonstrating appropriate behavior. No harm voiced to self.     Pt

## 2021-05-09 NOTE — PROGRESS NOTES
Patient refusing lab draws at this time. Need for labs explained to patient by RN. Pt. Is tearful and upset, states she is feeling overwhelmed and id tired of have blood draws. Will allow pt to calm down and relax and discuss again.

## 2021-05-09 NOTE — PLAN OF CARE
Problem: ANTEPARTUM/LABOR and DELIVERY  Goal: Maintain pregnancy as long as maternal and/or fetal condition is stable  Description: INTERVENTIONS:  - Maternal surveillance  - Fetal surveillance  - Monitor uterine activity  - Medications as ordered  - Bed goal  Description: INTERVENTIONS:  - Encourage pt to monitor pain and request assistance  - Assess pain using appropriate pain scale  - Administer analgesics based on type and severity of pain and evaluate response  - Implement non-pharmacological measures Assess mother's knowledge and previous experience with breast feeding.  - Provide information as needed about early infant feeding cues (e.g., rooting, lip smacking, sucking fingers/hand) versus late cue of crying.  - Discuss/demonstrate breast feeding aid mother/family.  - Provide /case management support as needed.   Outcome: Progressing

## 2021-05-09 NOTE — LACTATION NOTE
Patient reports she doesn't plan to breastfeed/pump at this time. Reviewed breast changes to expect. Informed patient regarding Lactation Support Services and encouraged her to call for RN or LC if she changes her mind.  Reviewed the importance of colostrum

## 2021-05-09 NOTE — OPERATIVE REPORT
[facility]    PATIENT'S NAME: Sy Hall   ATTENDING PHYSICIAN: Armando A. Irving Frankel, MD   OPERATING PHYSICIAN: Tung Acosta MD   PATIENT ACCOUNT#:   [de-identified]    LOCATION:  [address] [place] [address] #:   [id number]       DATE OF manner. After an adequate level of spinal anesthetic was assessed, a low transverse Pfannenstiel incision was made through the patient's old prior .   Scar carried down through the subcutaneous tissue to the level of the anterior rectus fascia whi then closed with 0 Vicryl suture in continuing stitch in 2 halves, and subcutaneous tissue was irrigated and dried and any oozing point was Bovie cauterized and hemostasis was noted. The subcutaneous tissue was approximated with 3-0 plain gut suture.   The

## 2021-05-10 PROBLEM — O14.13 SEVERE PRE-ECLAMPSIA IN THIRD TRIMESTER: Status: ACTIVE | Noted: 2021-05-10

## 2021-05-10 PROBLEM — Z34.90 PREGNANCY: Status: RESOLVED | Noted: 2021-02-22 | Resolved: 2021-05-10

## 2021-05-10 PROBLEM — O14.13 SEVERE PRE-ECLAMPSIA IN THIRD TRIMESTER (HCC): Status: ACTIVE | Noted: 2021-05-10

## 2021-05-10 PROBLEM — Z34.90 PREGNANCY (HCC): Status: RESOLVED | Noted: 2021-02-22 | Resolved: 2021-05-10

## 2021-05-10 RX ORDER — HYDROCODONE BITARTRATE AND ACETAMINOPHEN 5; 325 MG/1; MG/1
2 TABLET ORAL EVERY 6 HOURS PRN
Status: DISCONTINUED | OUTPATIENT
Start: 2021-05-10 | End: 2021-05-12

## 2021-05-10 NOTE — BH PROGRESS NOTE
MEEK PPD SCREENING    Reason for Referral: EPDS 14    Current Stressors:    History of PPD: hx PPD with first pregnancy, \"I felt a lot of depression, I was very shut down and I was in very different circumstances, at that time I was in a different state an is a lot of circumstantial anxiety because of the preeclampsia and premature delivery. I feel like I've been in a very good place emotionally during the pregnancy and know that I have tools, resources, and support when I need it.  I want to keep taking my w discharge instructions   Other Information Given: MOPS, outpatient providers in discharge instructions    Discussed case with RN following completion of consultation    Dwaine Wilson

## 2021-05-10 NOTE — PLAN OF CARE
Patient received into room 370 via wheelchair. Report received from [facility], [facility]. Patient transferred to bed without incident. Bed in locked and low position. Side rails up x 2. VSS. Fundus firm at u/1, lochia small, no clots noted. IV intact.  Pain level 8/1 maintain glucose within target range  - Assess barriers to adequate nutritional intake and initiate nutrition consult as needed  - Instruct patient on self management of diabetes  Outcome: Progressing     Problem: PAIN - ADULT  Goal: Verbalizes/displays ad INTERVENTIONS:  - Assess caregiver- interactions. - Assess caregiver's emotional status and coping mechanisms. - Encourage caregiver to participate in  daily care.   - Assess support systems available to mother/family.  - Provide social serv

## 2021-05-10 NOTE — PLAN OF CARE
Problem: ANTEPARTUM/LABOR and DELIVERY  Goal: Anxiety is at manageable level  Description: INTERVENTIONS:  - [place] patient to unit/surroundings  - Establish a trusting relationship with patient  - Discuss possible complications or alterations in birth p measures as appropriate and evaluate response  - Consider cultural and social influences on pain and pain management  - Manage/alleviate anxiety  - Utilize distraction and/or relaxation techniques  - Monitor for opioid side effects  - Notify MD/LIP if inte

## 2021-05-10 NOTE — PAYOR COMM NOTE
--------------  CONTINUED STAY REVIEW------REQUESTING ADDITIONAL DAY 5/10, CLINICALS OR OR REPORT 5/8      Payor: [employer]MM LYON  Subscriber #:  [id number]  Authorization Number: [id number]    Admit date: 5/4/21  Admit time:  8:05 PM    Admitting Physician:   piggyback, Cytotec 800 mcg per rectum and Hemabate 250 mcg IM in addition to high-dose Pitocin.     URINE OUTPUT:  150 mL.     INTRAVENOUS FLUID USED:  Crystalloids 500 mL.     FINDINGS:  Viable female infant weighing 7 pounds 6 ounces and Apgar scores of incision was grasped circumferentially with ring forceps, and there was transient brisk uterine bleeding from the lower segment requiring high-dose IV Pitocin as well as IM Hemabate 250 mg and Cytotec 800 mcg per rectum.   Uterine incision was closed with a    Subjective   Seen in SCN. Feeling better. Pain well controlled. No headaches.   Eating and ambulating.           Objective   Vital signs in last 24 hours:  Temp:  [98.1 °F (36.7 °C)-98.7 °F (37.1 °C)] 98.7 °F (37.1 °C)  Pulse:  [69-96] 96  Resp:  [14 Gestational diabetes mellitus (GDM) in third trimester     Severe pre-eclampsia in third trimester     PO 2  S/p repeat LTCS 33 4/7 wks; preeclampsia w/ severe; poorly controlled GDM; macrosomia  Recovering.   Anxiety- on Lorazepam 0.5 mg po bid  Social ser

## 2021-05-10 NOTE — PROGRESS NOTES
[facility] HOSP - [facility]    Progress Note    Amita Rome Patient Status:  Inpatient    1985 MRN [id number]   Location [address]  Attending Johana Hernandez MD   Hosp Day # 5 PCP Eyad Hart MD, MD liquid 15 g, 15 g, Oral, Q15 Min PRN **OR** Glucose-Vitamin C (DEX-4) chewable tab 4 tablet, 4 tablet, Oral, Q15 Min PRN **OR** dextrose 50 % injection 50 mL, 50 mL, Intravenous, Q15 Min PRN **OR** glucose (DEX4) oral liquid 30 g, 30 g, Oral, Q15 Min PRN * rate 16, weight 186 lb 15.2 oz (84.8 kg), last menstrual period 09/01/2020, SpO2 96 %, not currently breastfeeding. General: Alert, orientated x3. Cooperative. No apparent distress. HEENT: Exam is unremarkable. Neck: Supple.   Lungs: Clear bilaterall

## 2021-05-10 NOTE — PROGRESS NOTES
[facility]    OB/GYNE Progress Note      Amita Rome Patient Status:  Inpatient    1985 MRN [id number]   Location [facility] 3SE Attending Johana Hernandez MD   Hosp Day # 6 PCP Eyad Hart MD, MD Korina Carmichael second trimester, antepartum     Back pain affecting pregnancy in second trimester     Vaginal bleeding     GBS bacteriuria      uterine contractions, antepartum, third trimester     Persistent headaches     Gestational diabetes mellitus (GDM) in th

## 2021-05-10 NOTE — CM/SW NOTE
SW self referral due to infant in SCN    SW met with patient and FOB [place] bedside. SW confirmed face sheet contact as correct.     Baby boy/girl name: baby girl: Erick  Date & time of delivery: 21 @4:15pm  Delivery method: Section  [employer]

## 2021-05-11 RX ORDER — ACETAMINOPHEN 500 MG
1000 TABLET ORAL EVERY 6 HOURS PRN
Status: DISCONTINUED | OUTPATIENT
Start: 2021-05-11 | End: 2021-05-12

## 2021-05-11 RX ORDER — IBUPROFEN 600 MG/1
600 TABLET ORAL EVERY 6 HOURS PRN
Status: DISCONTINUED | OUTPATIENT
Start: 2021-05-11 | End: 2021-05-12

## 2021-05-11 NOTE — PROGRESS NOTES
0830  Staff attempted to check blood sugar but patient states she is not ready to eat breakfast yet and declines to have sugar checked at this time. 10:25    Patient ate breakfast and did not call RN/staff for accucheck.  RN not notified by dietary servi

## 2021-05-11 NOTE — PLAN OF CARE
Pain currently managed with Norco 10mg and gabapentin. Voiding freely. Fundus firm and midline. Bleeding WDL. Up ad reshma. Diet tolerated. Interacting appropriately with infant. Plan for D/C home on 05/12.      Problem: ANTEPARTUM/LABOR and DELIVERY  Goal: An to monitor pain and request assistance  - Assess pain using appropriate pain scale  - Administer analgesics based on type and severity of pain and evaluate response  - Implement non-pharmacological measures as appropriate and evaluate response  - Consider

## 2021-05-11 NOTE — PLAN OF CARE
Problem: POSTPARTUM  Goal: Long Term Goal:Experiences normal postpartum course  Description: INTERVENTIONS:  - Assess and monitor vital signs and lab values. - Assess fundus and lochia. - Provide ice/sitz baths for perineum discomfort.   - Monitor heali nursing footstool/pillows, and breast pumps). - Encourage mother/other family members to express feelings/concerns, and actively listen. - Educate father/SO about benefits of breast feeding and how to manage common lactation challenges.   - Recommend avoi

## 2021-05-11 NOTE — PAYOR COMM NOTE
--------------  CONTINUED STAY REVIEW    Payor: [employer]MM LYON  Subscriber #:  [id number]  Authorization Number: [id number]    Admit date: 5/4/21  Admit time:  8:05 PM    Admitting Physician: Rosanne Gerber MD  Attending Physician:  David Harrell, 05/08/2021     WBC 13.3 (H) 05/10/2021     HGB 10.1 (L) 05/10/2021     HCT 30.5 (L) 05/10/2021     .0 05/10/2021     CREATSERUM 0.80 05/07/2021     BUN 12 05/07/2021      05/07/2021     K 3.7 05/07/2021      05/07/2021     CO2 21.0 05/07 1-7 Units     Date Action Dose Route User    5/10/2021 1807 Given 1 Units Subcutaneous (Right Upper Arm) Jacinta Savage RN      [facility]) chewable tab 80 mg     Date Action Dose Route User    5/10/2021 1620 Given 80 mg Oral Mancel Janette RN

## 2021-05-11 NOTE — PROGRESS NOTES
[facility]    OB/GYNE Progress Note      Makaylaallan Romerojaswinder Patient Status:  Inpatient    1985 MRN [id number]   Location [facility] 3SE Attending Shanice Carl MD   Hosp Day # 7 PCP Agnieszka English MD, MD Pratibha Carey 05/07/2021    BUN 12 05/07/2021     05/07/2021    K 3.7 05/07/2021     05/07/2021    CO2 21.0 05/07/2021     (H) 05/07/2021    CA 9.1 05/07/2021    ALB 2.2 (L) 05/07/2021    ALKPHO 89 05/07/2021    BILT 0.1 05/07/2021    TP 6.2 (L) 05/07

## 2021-05-12 ENCOUNTER — TELEPHONE (OUTPATIENT)
Dept: OBGYN CLINIC | Facility: CLINIC | Age: 36
End: 2021-05-12

## 2021-05-12 ENCOUNTER — MED REC SCAN ONLY (OUTPATIENT)
Dept: ADMINISTRATIVE | Age: 36
End: 2021-05-12

## 2021-05-12 VITALS
TEMPERATURE: 98 F | WEIGHT: 186.94 LBS | OXYGEN SATURATION: 96 % | RESPIRATION RATE: 14 BRPM | BODY MASS INDEX: 33 KG/M2 | DIASTOLIC BLOOD PRESSURE: 78 MMHG | HEART RATE: 82 BPM | SYSTOLIC BLOOD PRESSURE: 120 MMHG

## 2021-05-12 PROBLEM — N93.9 VAGINAL BLEEDING: Status: RESOLVED | Noted: 2021-04-01 | Resolved: 2021-05-12

## 2021-05-12 RX ORDER — PSEUDOEPHEDRINE HCL 30 MG
100 TABLET ORAL 2 TIMES DAILY
Qty: 20 CAPSULE | Refills: 0 | Status: SHIPPED | OUTPATIENT
Start: 2021-05-12

## 2021-05-12 RX ORDER — IBUPROFEN 600 MG/1
600 TABLET ORAL EVERY 6 HOURS PRN
Qty: 12 TABLET | Refills: 0 | Status: SHIPPED | OUTPATIENT
Start: 2021-05-12

## 2021-05-12 RX ORDER — HYDROCODONE BITARTRATE AND ACETAMINOPHEN 7.5; 325 MG/1; MG/1
1 TABLET ORAL EVERY 6 HOURS PRN
Qty: 15 TABLET | Refills: 0 | Status: SHIPPED | OUTPATIENT
Start: 2021-05-12 | End: 2021-05-17

## 2021-05-12 RX ORDER — LORAZEPAM 0.5 MG/1
0.5 TABLET ORAL 2 TIMES DAILY PRN
Qty: 12 TABLET | Refills: 0 | Status: SHIPPED | OUTPATIENT
Start: 2021-05-12

## 2021-05-12 NOTE — DISCHARGE PLANNING
Discharge order received from MD. Mom in stable condition. Discharge instructions given regarding pelvic rest, bleeding expectations, preeclampsia signs and symptoms, MD followup, incisional care, and pain medication. Prescriptions explained.  Patient disch

## 2021-05-12 NOTE — DISCHARGE SUMMARY
[facility] HOSP - [facility]    OB/GYNE Discharge Note      Nate Durbin Patient Status:  Inpatient    1985 MRN [id number]   Location [facility] 3SE Attending Suzanne English MD   Hosp Day # 8 PCP Talya Cuello MD, MD     Admit mellitus (GDM) in third trimester     Severe pre-eclampsia in third trimester     PO 4  S/p primary LTCS  Preeclampsia, htn stable BP.   Counseled on trying to avoid Ibuprofen if possible  FU in 2 days in office for BP check with nurse along with wound chec

## 2021-05-12 NOTE — PAYOR COMM NOTE
--------------  DISCHARGE REVIEW    Payor: [employer]  Subscriber #:  [id number]  Authorization Number: [id number]    Admit date: 5/4/21  Admit time:   8:05 PM  Discharge Date: 5/12/2021 11:04 AM     Admitting Physician: Sola Rodriguez MD  Attending Attention deficit hyperactivity disorder (ADHD), predominantly inattentive type     Elevated d-dimer     Episode of recurrent major depressive disorder [facility])     Musculoskeletal chest pain     Chronic post-traumatic stress disorder (PTSD)     Thoughts of s

## 2021-05-12 NOTE — PLAN OF CARE
Pain controlled with motrin and norco - pt reporting better pain control today. Voiding freely. Fundus firm and midline. Bleeding WDL. Up ad reshma. Diet tolerated. Infant in SCN. Plan for D/C home on 05/12.     Problem: ANTEPARTUM/LABOR and DELIVERY  Goal: An to monitor pain and request assistance  - Assess pain using appropriate pain scale  - Administer analgesics based on type and severity of pain and evaluate response  - Implement non-pharmacological measures as appropriate and evaluate response  - Consider the breast  Description: INTERVENTIONS:  - Initiate breast feeding within first hour after birth. - Monitor effectiveness of current breast feeding efforts.   - Assess support systems available to mother/family.  - Identify cultural beliefs/practices joaquina comfort measures.   Outcome: Progressing

## 2021-05-13 NOTE — TELEPHONE ENCOUNTER
Dr. Yung Lyon,     Please sign off on form: FMLA - Maternity leave   -Highlight the patient and hit \"Chart\" button.   -In Chart Review, w/in the Encounter tab - click 1 time on the Telephone call encounter for 5/12/21 Scroll down the telephone encounter.  -Cl

## 2021-05-14 ENCOUNTER — TELEPHONE (OUTPATIENT)
Dept: OBGYN CLINIC | Facility: CLINIC | Age: 36
End: 2021-05-14

## 2021-05-14 NOTE — TELEPHONE ENCOUNTER
Just an FYI, I rescheduled an incision check from 5/14 to 5/17 per request from the pt. Please advise if this is an issue for the pt.

## 2021-05-17 ENCOUNTER — NURSE ONLY (OUTPATIENT)
Dept: OBGYN CLINIC | Facility: CLINIC | Age: 36
End: 2021-05-17
Payer: COMMERCIAL

## 2021-05-17 ENCOUNTER — TELEPHONE (OUTPATIENT)
Dept: OBGYN CLINIC | Facility: CLINIC | Age: 36
End: 2021-05-17

## 2021-05-17 VITALS — TEMPERATURE: 98 F | DIASTOLIC BLOOD PRESSURE: 78 MMHG | SYSTOLIC BLOOD PRESSURE: 112 MMHG

## 2021-05-17 DIAGNOSIS — Z48.02 ENCOUNTER FOR STAPLE REMOVAL: Primary | ICD-10-CM

## 2021-05-17 PROCEDURE — 3078F DIAST BP <80 MM HG: CPT | Performed by: OBSTETRICS & GYNECOLOGY

## 2021-05-17 PROCEDURE — 99024 POSTOP FOLLOW-UP VISIT: CPT | Performed by: OBSTETRICS & GYNECOLOGY

## 2021-05-17 PROCEDURE — 3074F SYST BP LT 130 MM HG: CPT | Performed by: OBSTETRICS & GYNECOLOGY

## 2021-05-17 RX ORDER — HYDROCODONE BITARTRATE AND ACETAMINOPHEN 7.5; 325 MG/1; MG/1
1 TABLET ORAL EVERY 6 HOURS PRN
Qty: 5 TABLET | Refills: 0 | Status: SHIPPED | OUTPATIENT
Start: 2021-05-17

## 2021-05-17 RX ORDER — IBUPROFEN 600 MG/1
600 TABLET ORAL EVERY 6 HOURS PRN
Qty: 12 TABLET | Refills: 0 | OUTPATIENT
Start: 2021-05-17

## 2021-05-17 RX ORDER — HYDROCODONE BITARTRATE AND ACETAMINOPHEN 7.5; 325 MG/1; MG/1
1 TABLET ORAL EVERY 6 HOURS PRN
Qty: 5 TABLET | Refills: 0 | OUTPATIENT
Start: 2021-05-17

## 2021-05-17 NOTE — PROGRESS NOTES
Pt Name and  verified. 21  1045   BP:    Temp: 98.4 °F (36.9 °C)       Pt here for staple removal. Incision well appoximated and intact, no redness, discharge or swelling.  Staples removed, cleaned with normal saline and hydrogen peroxide

## 2021-05-17 NOTE — TELEPHONE ENCOUNTER
Pt was seen today for Staple removal. Pt is requesting if [facility] can send in a refill for Mar[place] and ibuprofen as she still has pain around incision site and this medication has been helping with keeping pain at [place] [address].  If refill can be sent just for two more day

## 2021-05-17 NOTE — TELEPHONE ENCOUNTER
DAVID completed and faxed to [facility] [phone].  Sent pt Sermo message

## 2021-05-24 ENCOUNTER — TELEPHONE (OUTPATIENT)
Dept: OBGYN CLINIC | Facility: CLINIC | Age: 36
End: 2021-05-24

## 2021-05-27 ENCOUNTER — TELEPHONE (OUTPATIENT)
Dept: OBGYN UNIT | Facility: HOSPITAL | Age: 36
End: 2021-05-27

## 2021-05-27 ENCOUNTER — TELEPHONE (OUTPATIENT)
Dept: OBGYN CLINIC | Facility: CLINIC | Age: 36
End: 2021-05-27

## 2021-05-27 NOTE — PROGRESS NOTES
Reviewed self and infant care with mom, she verbalizes understanding of instruction reviewed. Encouraged to follow up with MD's as directed with questions/concerns. Pt states passing blood clots, size roughly of golf ball.   Pt states she cancelled two

## 2021-05-27 NOTE — TELEPHONE ENCOUNTER
Passing larger size blood clots ,   They did a call out after birth to check on patient  Asking if nurse can call her

## 2021-06-25 ENCOUNTER — IMMUNIZATION (OUTPATIENT)
Dept: LAB | Facility: HOSPITAL | Age: 36
End: 2021-06-25
Attending: EMERGENCY MEDICINE
Payer: COMMERCIAL

## 2021-06-25 DIAGNOSIS — Z23 NEED FOR VACCINATION: Primary | ICD-10-CM

## 2021-06-25 PROCEDURE — 0001A SARSCOV2 VAC 30MCG/0.3ML IM: CPT

## 2021-07-07 ENCOUNTER — TELEPHONE (OUTPATIENT)
Dept: OBGYN CLINIC | Facility: CLINIC | Age: 36
End: 2021-07-07

## 2021-07-07 NOTE — TELEPHONE ENCOUNTER
Patient needs to be scheduled for a post partum visit and for a colposcopy due to her abnormal pap smear during the pregnancy. These should be two separate visits. Please contact patient and schedule these visits.

## 2021-07-10 NOTE — TELEPHONE ENCOUNTER
Please send patient a certified letter stating that she needs to return for a colposcopy due to her previous abnormal pap smear. Pt should also be informed that she needs a separate post partum visit with her delivering physician.

## 2021-07-16 ENCOUNTER — IMMUNIZATION (OUTPATIENT)
Dept: LAB | Facility: HOSPITAL | Age: 36
End: 2021-07-16
Attending: EMERGENCY MEDICINE
Payer: COMMERCIAL

## 2021-07-16 DIAGNOSIS — Z23 NEED FOR VACCINATION: Primary | ICD-10-CM

## 2021-07-16 PROCEDURE — 0002A SARSCOV2 VAC 30MCG/0.3ML IM: CPT

## 2022-04-18 ENCOUNTER — APPOINTMENT (OUTPATIENT)
Dept: CT IMAGING | Facility: HOSPITAL | Age: 37
End: 2022-04-18
Attending: EMERGENCY MEDICINE

## 2022-04-18 ENCOUNTER — HOSPITAL ENCOUNTER (INPATIENT)
Facility: HOSPITAL | Age: 37
LOS: 2 days | Discharge: HOME | End: 2022-04-20
Attending: EMERGENCY MEDICINE | Admitting: INTERNAL MEDICINE

## 2022-04-18 DIAGNOSIS — K56.7 ILEUS (HCC): Primary | ICD-10-CM

## 2022-04-18 DIAGNOSIS — E87.6 HYPOKALEMIA: ICD-10-CM

## 2022-04-18 LAB
ADENOVIRUS F 40/41 PCR: NEGATIVE
ALBUMIN SERPL-MCNC: 3.7 G/DL (ref 3.4–5)
ALP LIVER SERPL-CCNC: 110 U/L
ALT SERPL-CCNC: 43 U/L
ANION GAP SERPL CALC-SCNC: 9 MMOL/L (ref 0–18)
AST SERPL-CCNC: 28 U/L (ref 15–37)
ASTROVIRUS PCR: NEGATIVE
BASOPHILS # BLD AUTO: 0.02 X10(3) UL (ref 0–0.2)
BASOPHILS NFR BLD AUTO: 0.2 %
BILIRUB DIRECT SERPL-MCNC: 0.1 MG/DL (ref 0–0.2)
BILIRUB SERPL-MCNC: 0.4 MG/DL (ref 0.1–2)
BUN BLD-MCNC: 5 MG/DL (ref 7–18)
BUN/CREAT SERPL: 5.3 (ref 10–20)
C CAYETANENSIS DNA SPEC QL NAA+PROBE: NEGATIVE
C DIFF TOX B STL QL: NEGATIVE
CALCIUM BLD-MCNC: 9.1 MG/DL (ref 8.5–10.1)
CAMPY SP DNA.DIARRHEA STL QL NAA+PROBE: NEGATIVE
CHLORIDE SERPL-SCNC: 108 MMOL/L (ref 98–112)
CO2 SERPL-SCNC: 20 MMOL/L (ref 21–32)
CREAT BLD-MCNC: 0.95 MG/DL
CRYPTOSP DNA SPEC QL NAA+PROBE: NEGATIVE
DEPRECATED RDW RBC AUTO: 42.1 FL (ref 35.1–46.3)
EAEC PAA PLAS AGGR+AATA ST NAA+NON-PRB: NEGATIVE
EC STX1+STX2 + H7 FLIC SPEC NAA+PROBE: NEGATIVE
ENTAMOEBA HISTOLYTICA PCR: NEGATIVE
EOSINOPHIL # BLD AUTO: 0.12 X10(3) UL (ref 0–0.7)
EOSINOPHIL NFR BLD AUTO: 1.4 %
EPEC EAE GENE STL QL NAA+NON-PROBE: NEGATIVE
ERYTHROCYTE [DISTWIDTH] IN BLOOD BY AUTOMATED COUNT: 13 % (ref 11–15)
ETEC LTA+ST1A+ST1B TOX ST NAA+NON-PROBE: NEGATIVE
GIARDIA LAMBLIA PCR: NEGATIVE
GLUCOSE BLD-MCNC: 123 MG/DL (ref 70–99)
HCG SERPL QL: NEGATIVE
HCT VFR BLD AUTO: 41.3 %
HGB BLD-MCNC: 13.5 G/DL
IMM GRANULOCYTES # BLD AUTO: 0.03 X10(3) UL (ref 0–1)
IMM GRANULOCYTES NFR BLD: 0.4 %
LACTATE SERPL-SCNC: 1 MMOL/L (ref 0.4–2)
LACTATE SERPL-SCNC: 2.7 MMOL/L (ref 0.4–2)
LIPASE SERPL-CCNC: 49 U/L (ref 73–393)
LYMPHOCYTES # BLD AUTO: 1.04 X10(3) UL (ref 1–4)
LYMPHOCYTES NFR BLD AUTO: 12.4 %
MAGNESIUM SERPL-MCNC: 1.5 MG/DL (ref 1.6–2.6)
MCH RBC QN AUTO: 28.9 PG (ref 26–34)
MCHC RBC AUTO-ENTMCNC: 32.7 G/DL (ref 31–37)
MCV RBC AUTO: 88.4 FL
MONOCYTES # BLD AUTO: 0.33 X10(3) UL (ref 0.1–1)
MONOCYTES NFR BLD AUTO: 3.9 %
NEUTROPHILS # BLD AUTO: 6.87 X10 (3) UL (ref 1.5–7.7)
NEUTROPHILS # BLD AUTO: 6.87 X10(3) UL (ref 1.5–7.7)
NEUTROPHILS NFR BLD AUTO: 81.7 %
NOROVIRUS GI/GII PCR: NEGATIVE
OSMOLALITY SERPL CALC.SUM OF ELEC: 283 MOSM/KG (ref 275–295)
P SHIGELLOIDES DNA STL QL NAA+PROBE: NEGATIVE
PLATELET # BLD AUTO: 238 10(3)UL (ref 150–450)
POTASSIUM SERPL-SCNC: 3.1 MMOL/L (ref 3.5–5.1)
PROT SERPL-MCNC: 8 G/DL (ref 6.4–8.2)
RBC # BLD AUTO: 4.67 X10(6)UL
ROTAVIRUS A PCR: POSITIVE
SALMONELLA DNA SPEC QL NAA+PROBE: NEGATIVE
SAPOVIRUS PCR: NEGATIVE
SARS-COV-2 RNA RESP QL NAA+PROBE: NOT DETECTED
SHIGELLA SP+EIEC IPAH ST NAA+NON-PROBE: NEGATIVE
SODIUM SERPL-SCNC: 137 MMOL/L (ref 136–145)
V CHOLERAE DNA SPEC QL NAA+PROBE: NEGATIVE
VIBRIO DNA SPEC NAA+PROBE: NEGATIVE
WBC # BLD AUTO: 8.4 X10(3) UL (ref 4–11)
YERSINIA DNA SPEC NAA+PROBE: NEGATIVE

## 2022-04-18 PROCEDURE — 83605 ASSAY OF LACTIC ACID: CPT | Performed by: EMERGENCY MEDICINE

## 2022-04-18 PROCEDURE — 99285 EMERGENCY DEPT VISIT HI MDM: CPT

## 2022-04-18 PROCEDURE — 74176 CT ABD & PELVIS W/O CONTRAST: CPT | Performed by: EMERGENCY MEDICINE

## 2022-04-18 PROCEDURE — 87507 IADNA-DNA/RNA PROBE TQ 12-25: CPT | Performed by: EMERGENCY MEDICINE

## 2022-04-18 PROCEDURE — 87493 C DIFF AMPLIFIED PROBE: CPT | Performed by: EMERGENCY MEDICINE

## 2022-04-18 PROCEDURE — 83690 ASSAY OF LIPASE: CPT | Performed by: EMERGENCY MEDICINE

## 2022-04-18 PROCEDURE — 80048 BASIC METABOLIC PNL TOTAL CA: CPT | Performed by: EMERGENCY MEDICINE

## 2022-04-18 PROCEDURE — 96374 THER/PROPH/DIAG INJ IV PUSH: CPT

## 2022-04-18 PROCEDURE — 87040 BLOOD CULTURE FOR BACTERIA: CPT | Performed by: EMERGENCY MEDICINE

## 2022-04-18 PROCEDURE — 84703 CHORIONIC GONADOTROPIN ASSAY: CPT | Performed by: EMERGENCY MEDICINE

## 2022-04-18 PROCEDURE — 83735 ASSAY OF MAGNESIUM: CPT | Performed by: INTERNAL MEDICINE

## 2022-04-18 PROCEDURE — 80076 HEPATIC FUNCTION PANEL: CPT | Performed by: EMERGENCY MEDICINE

## 2022-04-18 PROCEDURE — 36415 COLL VENOUS BLD VENIPUNCTURE: CPT

## 2022-04-18 PROCEDURE — 85025 COMPLETE CBC W/AUTO DIFF WBC: CPT | Performed by: EMERGENCY MEDICINE

## 2022-04-18 PROCEDURE — 96361 HYDRATE IV INFUSION ADD-ON: CPT

## 2022-04-18 PROCEDURE — 96375 TX/PRO/DX INJ NEW DRUG ADDON: CPT

## 2022-04-18 RX ORDER — METOCLOPRAMIDE HYDROCHLORIDE 5 MG/ML
10 INJECTION INTRAMUSCULAR; INTRAVENOUS ONCE
Status: DISCONTINUED | OUTPATIENT
Start: 2022-04-18 | End: 2022-04-18

## 2022-04-18 RX ORDER — KETOROLAC TROMETHAMINE 30 MG/ML
30 INJECTION, SOLUTION INTRAMUSCULAR; INTRAVENOUS ONCE
Status: COMPLETED | OUTPATIENT
Start: 2022-04-18 | End: 2022-04-18

## 2022-04-18 RX ORDER — MORPHINE SULFATE 2 MG/ML
2 INJECTION, SOLUTION INTRAMUSCULAR; INTRAVENOUS ONCE
Status: COMPLETED | OUTPATIENT
Start: 2022-04-18 | End: 2022-04-18

## 2022-04-18 RX ORDER — MORPHINE SULFATE 4 MG/ML
4 INJECTION, SOLUTION INTRAMUSCULAR; INTRAVENOUS ONCE
Status: COMPLETED | OUTPATIENT
Start: 2022-04-18 | End: 2022-04-18

## 2022-04-18 RX ORDER — ONDANSETRON 2 MG/ML
4 INJECTION INTRAMUSCULAR; INTRAVENOUS EVERY 6 HOURS PRN
Status: DISCONTINUED | OUTPATIENT
Start: 2022-04-18 | End: 2022-04-20

## 2022-04-18 RX ORDER — KETOROLAC TROMETHAMINE 30 MG/ML
30 INJECTION, SOLUTION INTRAMUSCULAR; INTRAVENOUS EVERY 6 HOURS PRN
Status: DISCONTINUED | OUTPATIENT
Start: 2022-04-18 | End: 2022-04-20

## 2022-04-18 RX ORDER — ACETAMINOPHEN 500 MG
1000 TABLET ORAL ONCE
Status: COMPLETED | OUTPATIENT
Start: 2022-04-18 | End: 2022-04-18

## 2022-04-18 RX ORDER — VANCOMYCIN HYDROCHLORIDE 125 MG/1
125 CAPSULE ORAL ONCE
Status: COMPLETED | OUTPATIENT
Start: 2022-04-18 | End: 2022-04-18

## 2022-04-18 RX ORDER — ACETAMINOPHEN 325 MG/1
650 TABLET ORAL EVERY 6 HOURS PRN
Status: DISCONTINUED | OUTPATIENT
Start: 2022-04-18 | End: 2022-04-20

## 2022-04-18 RX ORDER — SODIUM CHLORIDE 9 MG/ML
INJECTION, SOLUTION INTRAVENOUS CONTINUOUS
Status: DISCONTINUED | OUTPATIENT
Start: 2022-04-18 | End: 2022-04-20

## 2022-04-18 RX ORDER — ONDANSETRON 2 MG/ML
4 INJECTION INTRAMUSCULAR; INTRAVENOUS ONCE
Status: COMPLETED | OUTPATIENT
Start: 2022-04-18 | End: 2022-04-18

## 2022-04-18 RX ORDER — VANCOMYCIN HYDROCHLORIDE 125 MG/1
125 CAPSULE ORAL EVERY 6 HOURS
Status: DISCONTINUED | OUTPATIENT
Start: 2022-04-19 | End: 2022-04-19

## 2022-04-18 RX ORDER — DIPHENHYDRAMINE HYDROCHLORIDE 50 MG/ML
25 INJECTION INTRAMUSCULAR; INTRAVENOUS ONCE
Status: DISCONTINUED | OUTPATIENT
Start: 2022-04-18 | End: 2022-04-18

## 2022-04-18 RX ORDER — PROCHLORPERAZINE EDISYLATE 5 MG/ML
5 INJECTION INTRAMUSCULAR; INTRAVENOUS EVERY 8 HOURS PRN
Status: DISCONTINUED | OUTPATIENT
Start: 2022-04-18 | End: 2022-04-20

## 2022-04-18 NOTE — ED QUICK NOTES
Orders for admission, patient is aware of plan and ready to go upstairs. Any questions, please call ED RN Nitish at extension [phone].      Patient Covid vaccination status: Fully vaccinated     COVID Test Ordered in ED: Rapid SARS-CoV-2 by PCR    COVID Suspicion at Admission: Low clinical suspicion for COVID    Running Infusions:  NS bolus    Mental Status/LOC at time of transport: A/O x 4    Other pertinent information:   CIWA score: N/A   NIH score:  N/A

## 2022-04-18 NOTE — ED INITIAL ASSESSMENT (HPI)
Body aches, chills, abd pain, anxiety. Hx of C-Diff and states this feels similar to when she had C-diff. reports 12 liquid stools in the past 12 hrs.

## 2022-04-19 LAB
ANION GAP SERPL CALC-SCNC: 5 MMOL/L (ref 0–18)
BASOPHILS # BLD AUTO: 0.02 X10(3) UL (ref 0–0.2)
BASOPHILS NFR BLD AUTO: 0.3 %
BUN BLD-MCNC: 4 MG/DL (ref 7–18)
BUN/CREAT SERPL: 5.3 (ref 10–20)
CALCIUM BLD-MCNC: 7.5 MG/DL (ref 8.5–10.1)
CHLORIDE SERPL-SCNC: 115 MMOL/L (ref 98–112)
CO2 SERPL-SCNC: 24 MMOL/L (ref 21–32)
CREAT BLD-MCNC: 0.76 MG/DL
DEPRECATED RDW RBC AUTO: 44.4 FL (ref 35.1–46.3)
EOSINOPHIL # BLD AUTO: 0.1 X10(3) UL (ref 0–0.7)
EOSINOPHIL NFR BLD AUTO: 1.4 %
ERYTHROCYTE [DISTWIDTH] IN BLOOD BY AUTOMATED COUNT: 13.2 % (ref 11–15)
GLUCOSE BLD-MCNC: 97 MG/DL (ref 70–99)
HCT VFR BLD AUTO: 32.8 %
HGB BLD-MCNC: 10.4 G/DL
IMM GRANULOCYTES # BLD AUTO: 0.02 X10(3) UL (ref 0–1)
IMM GRANULOCYTES NFR BLD: 0.3 %
LYMPHOCYTES # BLD AUTO: 1.85 X10(3) UL (ref 1–4)
LYMPHOCYTES NFR BLD AUTO: 26.5 %
MAGNESIUM SERPL-MCNC: 1.7 MG/DL (ref 1.6–2.6)
MCH RBC QN AUTO: 28.9 PG (ref 26–34)
MCHC RBC AUTO-ENTMCNC: 31.7 G/DL (ref 31–37)
MCV RBC AUTO: 91.1 FL
MONOCYTES # BLD AUTO: 0.68 X10(3) UL (ref 0.1–1)
MONOCYTES NFR BLD AUTO: 9.7 %
NEUTROPHILS # BLD AUTO: 4.31 X10 (3) UL (ref 1.5–7.7)
NEUTROPHILS # BLD AUTO: 4.31 X10(3) UL (ref 1.5–7.7)
NEUTROPHILS NFR BLD AUTO: 61.8 %
OSMOLALITY SERPL CALC.SUM OF ELEC: 295 MOSM/KG (ref 275–295)
PLATELET # BLD AUTO: 182 10(3)UL (ref 150–450)
POTASSIUM SERPL-SCNC: 3 MMOL/L (ref 3.5–5.1)
POTASSIUM SERPL-SCNC: 4 MMOL/L (ref 3.5–5.1)
RBC # BLD AUTO: 3.6 X10(6)UL
SODIUM SERPL-SCNC: 144 MMOL/L (ref 136–145)
WBC # BLD AUTO: 7 X10(3) UL (ref 4–11)

## 2022-04-19 PROCEDURE — 80048 BASIC METABOLIC PNL TOTAL CA: CPT | Performed by: INTERNAL MEDICINE

## 2022-04-19 PROCEDURE — 83735 ASSAY OF MAGNESIUM: CPT | Performed by: INTERNAL MEDICINE

## 2022-04-19 PROCEDURE — 84132 ASSAY OF SERUM POTASSIUM: CPT | Performed by: INTERNAL MEDICINE

## 2022-04-19 PROCEDURE — 85025 COMPLETE CBC W/AUTO DIFF WBC: CPT | Performed by: INTERNAL MEDICINE

## 2022-04-19 RX ORDER — FLUTICASONE PROPIONATE 50 MCG
2 SPRAY, SUSPENSION (ML) NASAL DAILY
Status: DISCONTINUED | OUTPATIENT
Start: 2022-04-19 | End: 2022-04-20

## 2022-04-19 RX ORDER — GUAIFENESIN/DEXTROMETHORPHAN 100-10MG/5
100 SYRUP ORAL EVERY 4 HOURS PRN
Status: DISCONTINUED | OUTPATIENT
Start: 2022-04-19 | End: 2022-04-20

## 2022-04-19 RX ORDER — MORPHINE SULFATE 2 MG/ML
2 INJECTION, SOLUTION INTRAMUSCULAR; INTRAVENOUS EVERY 2 HOUR PRN
Status: DISCONTINUED | OUTPATIENT
Start: 2022-04-19 | End: 2022-04-20

## 2022-04-19 RX ORDER — SIMETHICONE 80 MG
80 TABLET,CHEWABLE ORAL 4 TIMES DAILY PRN
Status: DISCONTINUED | OUTPATIENT
Start: 2022-04-19 | End: 2022-04-20

## 2022-04-19 RX ORDER — POTASSIUM CHLORIDE 20 MEQ/1
40 TABLET, EXTENDED RELEASE ORAL EVERY 4 HOURS
Status: COMPLETED | OUTPATIENT
Start: 2022-04-19 | End: 2022-04-19

## 2022-04-19 RX ORDER — HYDROCODONE BITARTRATE AND ACETAMINOPHEN 7.5; 325 MG/1; MG/1
1 TABLET ORAL EVERY 4 HOURS PRN
Status: DISCONTINUED | OUTPATIENT
Start: 2022-04-19 | End: 2022-04-20

## 2022-04-19 RX ORDER — MAGNESIUM OXIDE 400 MG (241.3 MG MAGNESIUM) TABLET
400 TABLET ONCE
Status: COMPLETED | OUTPATIENT
Start: 2022-04-19 | End: 2022-04-19

## 2022-04-19 NOTE — CM/SW NOTE
Per chart review, pt presents from home with her , child and family members. Pt indep at baseline. Pt presents as self pay. SW consulted [employer]. SW spoke with Ric. Per Ric, pt's partner reports pt is under his insurance. Ric followed up with insurance and was notified that insurance is inactive. Pt's partner plans to follow up with insurance. SW placed [facility] in pt's AVS. [facility] did not evaluate to determine if pt qualifies for medicaid.      Audrey Bruce MSW, [facility]  Ext [phone]

## 2022-04-19 NOTE — PLAN OF CARE
Problem: Patient Centered Care  Goal: Patient preferences are identified and integrated in the patient's plan of care  Description: Interventions:  - What would you like us to know as we care for you? From home with boyfriend  - Provide timely, complete, and accurate information to patient/family  - Incorporate patient and family knowledge, values, beliefs, and cultural backgrounds into the planning and delivery of care  - Encourage patient/family to participate in care and decision-making at the level they choose  - Honor patient and family perspectives and choices  Outcome: Progressing      Problem: SAFETY ADULT - FALL  Goal: Free from fall injury  Description: INTERVENTIONS:  - Assess pt frequently for physical needs  - Identify cognitive and physical deficits and behaviors that affect risk of falls.   - [place] fall precautions as indicated by assessment.  - Educate pt/family on patient safety including physical limitations  - Instruct pt to call for assistance with activity based on assessment  - Modify environment to reduce risk of injury  - Provide assistive devices as appropriate  - Consider OT/PT consult to assist with strengthening/mobility  - Encourage toileting schedule  Outcome: Progressing     Problem: GASTROINTESTINAL - ADULT  Goal: Minimal or absence of nausea and vomiting  Description: INTERVENTIONS:  - Maintain adequate hydration with IV or PO as ordered and tolerated  - Nasogastric tube to low intermittent suction as ordered  - Evaluate effectiveness of ordered antiemetic medications  - Provide nonpharmacologic comfort measures as appropriate  - Advance diet as tolerated, if ordered  - Obtain nutritional consult as needed  - Evaluate fluid balance  Outcome: Progressing  Goal: Maintains or returns to baseline bowel function  Description: INTERVENTIONS:  - Assess bowel function  - Maintain adequate hydration with IV or PO as ordered and tolerated  - Evaluate effectiveness of GI medications  - Encourage mobilization and activity  - Obtain nutritional consult as needed  - Establish a toileting routine/schedule  - Consider collaborating with pharmacy to review patient's medication profile  Outcome: Progressing  Goal: Maintains adequate nutritional intake (undernourished)  Description: INTERVENTIONS:  - Monitor percentage of each meal consumed  - Identify factors contributing to decreased intake, treat as appropriate  - Assist with meals as needed  - Monitor I&O, WT and lab values  - Obtain nutritional consult as needed  - Optimize oral hygiene and moisture  - Encourage food from home; allow for food preferences  - Enhance eating environment  Outcome: Progressing  Goal: Achieves appropriate nutritional intake (bariatric)  Description: INTERVENTIONS:  - Monitor for over-consumption  - Identify factors contributing to increased intake, treat as appropriate  - Monitor I&O, WT and lab values  - Obtain nutritional consult as needed  - Evaluate psychosocial factors contributing to over-consumption  Outcome: Progressing     Vital signs are stable. PRN zofran, morphine and simethicone given for abdominal pain and discomfort. Magnesium and potassium covered, per protocol. IVF infusing. Call light within reach, pt calls appropriately.

## 2022-04-19 NOTE — PLAN OF CARE
Problem: Patient Centered Care  Goal: Patient preferences are identified and integrated in the patient's plan of care  Description: Interventions:  - What would you like us to know as we care for you?   - Provide timely, complete, and accurate information to patient/family  - Incorporate patient and family knowledge, values, beliefs, and cultural backgrounds into the planning and delivery of care  - Encourage patient/family to participate in care and decision-making at the level they choose  - Honor patient and family perspectives and choices  4/19/2022 0401 by Nelida Wilson RN  Outcome: Progressing  4/19/2022 0358 by Nelida Wilson RN  Outcome: Progressing     Problem: Patient/Family Goals  Goal: Patient/Family Long Term Goal  Description: Patient's Long Term Goal: feel well at home    Interventions:  - Follow POC  - See additional Care Plan goals for specific interventions  4/19/2022 0401 by Nelida Wilson RN  Outcome: Progressing  4/19/2022 Ameena [address] by Nelida Wilson RN  Outcome: Progressing  Goal: Patient/Family Short Term Goal  Description: Patient's Short Term Goal: Control pain    Interventions:   -  - See additional Care Plan goals for specific interventions  4/19/2022 0401 by Nelida Wilson RN  Outcome: Progressing  4/19/2022 Ameena 49 by Nelida Wilson RN  Outcome: Progressing     Problem: SAFETY ADULT - FALL  Goal: Free from fall injury  Description: INTERVENTIONS:  - Assess pt frequently for physical needs  - Identify cognitive and physical deficits and behaviors that affect risk of falls.   - [place] fall precautions as indicated by assessment.  - Educate pt/family on patient safety including physical limitations  - Instruct pt to call for assistance with activity based on assessment  - Modify environment to reduce risk of injury  - Provide assistive devices as appropriate  - Consider OT/PT consult to assist with strengthening/mobility  - Encourage toileting schedule  4/19/2022 0401 by Genesis Rosen RN  Outcome: Progressing  4/19/2022 0358 by Genesis Rosen RN  Outcome: Progressing     Problem: GASTROINTESTINAL - ADULT  Goal: Minimal or absence of nausea and vomiting  Description: INTERVENTIONS:  - Maintain adequate hydration with IV or PO as ordered and tolerated  - Nasogastric tube to low intermittent suction as ordered  - Evaluate effectiveness of ordered antiemetic medications  - Provide nonpharmacologic comfort measures as appropriate  - Advance diet as tolerated, if ordered  - Obtain nutritional consult as needed  - Evaluate fluid balance  4/19/2022 0401 by Genesis Rosen RN  Outcome: Progressing  4/19/2022 0358 by Genesis Rosen RN  Outcome: Progressing  Goal: Maintains or returns to baseline bowel function  Description: INTERVENTIONS:  - Assess bowel function  - Maintain adequate hydration with IV or PO as ordered and tolerated  - Evaluate effectiveness of GI medications  - Encourage mobilization and activity  - Obtain nutritional consult as needed  - Establish a toileting routine/schedule  - Consider collaborating with pharmacy to review patient's medication profile  4/19/2022 0401 by Genesis Rosen RN  Outcome: Progressing  4/19/2022 Ameena Carpio by Genesis Rosen RN  Outcome: Progressing  Goal: Maintains adequate nutritional intake (undernourished)  Description: INTERVENTIONS:  - Monitor percentage of each meal consumed  - Identify factors contributing to decreased intake, treat as appropriate  - Assist with meals as needed  - Monitor I&O, WT and lab values  - Obtain nutritional consult as needed  - Optimize oral hygiene and moisture  - Encourage food from home; allow for food preferences  - Enhance eating environment  4/19/2022 0401 by Genesis Rosen RN  Outcome: Progressing  4/19/2022 Ameena Carpio by Genesis Rosen RN  Outcome: Progressing  Goal: Achieves appropriate nutritional intake (bariatric)  Description: INTERVENTIONS:  - Monitor for over-consumption  - Identify factors contributing to increased intake, treat as appropriate  - Monitor I&O, WT and lab values  - Obtain nutritional consult as needed  - Evaluate psychosocial factors contributing to over-consumption  4/19/2022 0401 by Genesis Rosen, ROSENDA  Outcome: Progressing  4/19/2022 0358 by Genesis Rosen, RN  Outcome: Progressing

## 2022-04-19 NOTE — PLAN OF CARE
Problem: Patient Centered Care  Goal: Patient preferences are identified and integrated in the patient's plan of care  Description: Interventions:  - What would you like us to know as we care for you? I have had c-diff in the past  - Provide timely, complete, and accurate information to patient/family  - Incorporate patient and family knowledge, values, beliefs, and cultural backgrounds into the planning and delivery of care  - Encourage patient/family to participate in care and decision-making at the level they choose  - Honor patient and family perspectives and choices  Outcome: Progressing     Problem: Patient/Family Goals  Goal: Patient/Family Long Term Goal  Description: Patient's Long Term Goal: feel better    Interventions:  - Create POC  - See additional Care Plan goals for specific interventions  Outcome: Progressing  Goal: Patient/Family Short Term Goal  Description: Patient's Short Term Goal: Control my pain    Interventions:   - pain control  - See additional Care Plan goals for specific interventions  Outcome: Progressing     Problem: SAFETY ADULT - FALL  Goal: Free from fall injury  Description: INTERVENTIONS:  - Assess pt frequently for physical needs  - Identify cognitive and physical deficits and behaviors that affect risk of falls.   - [place] fall precautions as indicated by assessment.  - Educate pt/family on patient safety including physical limitations  - Instruct pt to call for assistance with activity based on assessment  - Modify environment to reduce risk of injury  - Provide assistive devices as appropriate  - Consider OT/PT consult to assist with strengthening/mobility  - Encourage toileting schedule  Outcome: Progressing     Problem: GASTROINTESTINAL - ADULT  Goal: Minimal or absence of nausea and vomiting  Description: INTERVENTIONS:  - Maintain adequate hydration with IV or PO as ordered and tolerated  - Nasogastric tube to low intermittent suction as ordered  - Evaluate effectiveness of ordered antiemetic medications  - Provide nonpharmacologic comfort measures as appropriate  - Advance diet as tolerated, if ordered  - Obtain nutritional consult as needed  - Evaluate fluid balance  Outcome: Progressing  Goal: Maintains or returns to baseline bowel function  Description: INTERVENTIONS:  - Assess bowel function  - Maintain adequate hydration with IV or PO as ordered and tolerated  - Evaluate effectiveness of GI medications  - Encourage mobilization and activity  - Obtain nutritional consult as needed  - Establish a toileting routine/schedule  - Consider collaborating with pharmacy to review patient's medication profile  Outcome: Progressing  Goal: Maintains adequate nutritional intake (undernourished)  Description: INTERVENTIONS:  - Monitor percentage of each meal consumed  - Identify factors contributing to decreased intake, treat as appropriate  - Assist with meals as needed  - Monitor I&O, WT and lab values  - Obtain nutritional consult as needed  - Optimize oral hygiene and moisture  - Encourage food from home; allow for food preferences  - Enhance eating environment  Outcome: Progressing  Goal: Achieves appropriate nutritional intake (bariatric)  Description: INTERVENTIONS:  - Monitor for over-consumption  - Identify factors contributing to increased intake, treat as appropriate  - Monitor I&O, WT and lab values  - Obtain nutritional consult as needed  - Evaluate psychosocial factors contributing to over-consumption  Outcome: Progressing

## 2022-04-20 VITALS
DIASTOLIC BLOOD PRESSURE: 71 MMHG | BODY MASS INDEX: 29.29 KG/M2 | RESPIRATION RATE: 16 BRPM | WEIGHT: 165.31 LBS | HEART RATE: 93 BPM | OXYGEN SATURATION: 100 % | TEMPERATURE: 98 F | HEIGHT: 63 IN | SYSTOLIC BLOOD PRESSURE: 113 MMHG

## 2022-04-20 LAB
ANION GAP SERPL CALC-SCNC: 5 MMOL/L (ref 0–18)
BASOPHILS # BLD AUTO: 0.03 X10(3) UL (ref 0–0.2)
BASOPHILS NFR BLD AUTO: 0.4 %
BILIRUB UR QL: NEGATIVE
BUN BLD-MCNC: 5 MG/DL (ref 7–18)
BUN/CREAT SERPL: 7.8 (ref 10–20)
CALCIUM BLD-MCNC: 8.8 MG/DL (ref 8.5–10.1)
CHLORIDE SERPL-SCNC: 110 MMOL/L (ref 98–112)
CLARITY UR: CLEAR
CO2 SERPL-SCNC: 26 MMOL/L (ref 21–32)
COLOR UR: COLORLESS
CREAT BLD-MCNC: 0.64 MG/DL
DEPRECATED RDW RBC AUTO: 42.9 FL (ref 35.1–46.3)
EOSINOPHIL # BLD AUTO: 0.17 X10(3) UL (ref 0–0.7)
EOSINOPHIL NFR BLD AUTO: 2.3 %
ERYTHROCYTE [DISTWIDTH] IN BLOOD BY AUTOMATED COUNT: 12.9 % (ref 11–15)
GLUCOSE BLD-MCNC: 96 MG/DL (ref 70–99)
GLUCOSE UR-MCNC: NEGATIVE MG/DL
HCT VFR BLD AUTO: 39.3 %
HGB BLD-MCNC: 12.6 G/DL
HGB UR QL STRIP.AUTO: NEGATIVE
IMM GRANULOCYTES # BLD AUTO: 0.02 X10(3) UL (ref 0–1)
IMM GRANULOCYTES NFR BLD: 0.3 %
KETONES UR-MCNC: NEGATIVE MG/DL
LEUKOCYTE ESTERASE UR QL STRIP.AUTO: NEGATIVE
LYMPHOCYTES # BLD AUTO: 3.1 X10(3) UL (ref 1–4)
LYMPHOCYTES NFR BLD AUTO: 41.5 %
MAGNESIUM SERPL-MCNC: 1.9 MG/DL (ref 1.6–2.6)
MCH RBC QN AUTO: 28.9 PG (ref 26–34)
MCHC RBC AUTO-ENTMCNC: 32.1 G/DL (ref 31–37)
MCV RBC AUTO: 90.1 FL
MONOCYTES # BLD AUTO: 0.55 X10(3) UL (ref 0.1–1)
MONOCYTES NFR BLD AUTO: 7.4 %
NEUTROPHILS # BLD AUTO: 3.6 X10 (3) UL (ref 1.5–7.7)
NEUTROPHILS # BLD AUTO: 3.6 X10(3) UL (ref 1.5–7.7)
NEUTROPHILS NFR BLD AUTO: 48.1 %
NITRITE UR QL STRIP.AUTO: NEGATIVE
OSMOLALITY SERPL CALC.SUM OF ELEC: 289 MOSM/KG (ref 275–295)
PH UR: 6 [PH] (ref 5–8)
PLATELET # BLD AUTO: 236 10(3)UL (ref 150–450)
POTASSIUM SERPL-SCNC: 3.7 MMOL/L (ref 3.5–5.1)
PROT UR-MCNC: NEGATIVE MG/DL
RBC # BLD AUTO: 4.36 X10(6)UL
SODIUM SERPL-SCNC: 141 MMOL/L (ref 136–145)
SP GR UR STRIP: 1.01 (ref 1–1.03)
UROBILINOGEN UR STRIP-ACNC: <2
VIT C UR-MCNC: NEGATIVE MG/DL
WBC # BLD AUTO: 7.5 X10(3) UL (ref 4–11)

## 2022-04-20 PROCEDURE — 81003 URINALYSIS AUTO W/O SCOPE: CPT | Performed by: EMERGENCY MEDICINE

## 2022-04-20 PROCEDURE — 83735 ASSAY OF MAGNESIUM: CPT | Performed by: INTERNAL MEDICINE

## 2022-04-20 PROCEDURE — 85025 COMPLETE CBC W/AUTO DIFF WBC: CPT | Performed by: INTERNAL MEDICINE

## 2022-04-20 PROCEDURE — 80048 BASIC METABOLIC PNL TOTAL CA: CPT | Performed by: INTERNAL MEDICINE

## 2022-04-20 NOTE — PLAN OF CARE
Problem: Patient Centered Care  Goal: Patient preferences are identified and integrated in the patient's plan of care  Description: Interventions:  - What would you like us to know as we care for you? From home with boyfriend  - Provide timely, complete, and accurate information to patient/family  - Incorporate patient and family knowledge, values, beliefs, and cultural backgrounds into the planning and delivery of care  - Encourage patient/family to participate in care and decision-making at the level they choose  - Honor patient and family perspectives and choices  Outcome: Progressing     Problem: Patient/Family Goals  Goal: Patient/Family Long Term Goal  Description: Patient's Long Term Goal: To go home    Interventions:  - Monitor vital signs and lab values  - PRN pain medication  - IVF  - Follow MD orders  - See additional Care Plan goals for specific interventions  Outcome: Progressing  Goal: Patient/Family Short Term Goal  Description: Patient's Short Term Goal: No more diarrhea and abdominal pain    Interventions:   - IVF  - PRN pain medication  - Follow MD order  - See additional Care Plan goals for specific interventions  Outcome: Progressing     Problem: SAFETY ADULT - FALL  Goal: Free from fall injury  Description: INTERVENTIONS:  - Assess pt frequently for physical needs  - Identify cognitive and physical deficits and behaviors that affect risk of falls.   - [place] fall precautions as indicated by assessment.  - Educate pt/family on patient safety including physical limitations  - Instruct pt to call for assistance with activity based on assessment  - Modify environment to reduce risk of injury  - Provide assistive devices as appropriate  - Consider OT/PT consult to assist with strengthening/mobility  - Encourage toileting schedule  Outcome: Progressing     Problem: GASTROINTESTINAL - ADULT  Goal: Minimal or absence of nausea and vomiting  Description: INTERVENTIONS:  - Maintain adequate hydration with IV or PO as ordered and tolerated  - Nasogastric tube to low intermittent suction as ordered  - Evaluate effectiveness of ordered antiemetic medications  - Provide nonpharmacologic comfort measures as appropriate  - Advance diet as tolerated, if ordered  - Obtain nutritional consult as needed  - Evaluate fluid balance  Outcome: Progressing  Goal: Maintains or returns to baseline bowel function  Description: INTERVENTIONS:  - Assess bowel function  - Maintain adequate hydration with IV or PO as ordered and tolerated  - Evaluate effectiveness of GI medications  - Encourage mobilization and activity  - Obtain nutritional consult as needed  - Establish a toileting routine/schedule  - Consider collaborating with pharmacy to review patient's medication profile  Outcome: Progressing  Goal: Maintains adequate nutritional intake (undernourished)  Description: INTERVENTIONS:  - Monitor percentage of each meal consumed  - Identify factors contributing to decreased intake, treat as appropriate  - Assist with meals as needed  - Monitor I&O, WT and lab values  - Obtain nutritional consult as needed  - Optimize oral hygiene and moisture  - Encourage food from home; allow for food preferences  - Enhance eating environment  Outcome: Progressing  Goal: Achieves appropriate nutritional intake (bariatric)  Description: INTERVENTIONS:  - Monitor for over-consumption  - Identify factors contributing to increased intake, treat as appropriate  - Monitor I&O, WT and lab values  - Obtain nutritional consult as needed  - Evaluate psychosocial factors contributing to over-consumption  Outcome: Progressing

## 2022-04-20 NOTE — PLAN OF CARE
Discharge education and IV removed by [address]. Patient understands follow up instructions. All questions answered, belongings gathered. Discharged to Home. Problem: Patient Centered Care  Goal: Patient preferences are identified and integrated in the patient's plan of care  Description: Interventions:  - What would you like us to know as we care for you? From home with boyfriend  - Provide timely, complete, and accurate information to patient/family  - Incorporate patient and family knowledge, values, beliefs, and cultural backgrounds into the planning and delivery of care  - Encourage patient/family to participate in care and decision-making at the level they choose  - Honor patient and family perspectives and choices  Outcome: Adequate for Discharge     Problem: Patient/Family Goals  Goal: Patient/Family Long Term Goal  Description: Patient's Long Term Goal: To go home    Interventions:  - Monitor vital signs and lab values  - PRN pain medication  - IVF  - Follow MD orders  - See additional Care Plan goals for specific interventions  Outcome: Adequate for Discharge  Goal: Patient/Family Short Term Goal  Description: Patient's Short Term Goal: No more diarrhea and abdominal pain    Interventions:   - IVF  - PRN pain medication  - Follow MD order  - See additional Care Plan goals for specific interventions  Outcome: Adequate for Discharge     Problem: SAFETY ADULT - FALL  Goal: Free from fall injury  Description: INTERVENTIONS:  - Assess pt frequently for physical needs  - Identify cognitive and physical deficits and behaviors that affect risk of falls.   - [place] fall precautions as indicated by assessment.  - Educate pt/family on patient safety including physical limitations  - Instruct pt to call for assistance with activity based on assessment  - Modify environment to reduce risk of injury  - Provide assistive devices as appropriate  - Consider OT/PT consult to assist with strengthening/mobility  - Encourage toileting schedule  Outcome: Adequate for Discharge     Problem: GASTROINTESTINAL - ADULT  Goal: Minimal or absence of nausea and vomiting  Description: INTERVENTIONS:  - Maintain adequate hydration with IV or PO as ordered and tolerated  - Nasogastric tube to low intermittent suction as ordered  - Evaluate effectiveness of ordered antiemetic medications  - Provide nonpharmacologic comfort measures as appropriate  - Advance diet as tolerated, if ordered  - Obtain nutritional consult as needed  - Evaluate fluid balance  Outcome: Adequate for Discharge  Goal: Maintains or returns to baseline bowel function  Description: INTERVENTIONS:  - Assess bowel function  - Maintain adequate hydration with IV or PO as ordered and tolerated  - Evaluate effectiveness of GI medications  - Encourage mobilization and activity  - Obtain nutritional consult as needed  - Establish a toileting routine/schedule  - Consider collaborating with pharmacy to review patient's medication profile  Outcome: Adequate for Discharge  Goal: Maintains adequate nutritional intake (undernourished)  Description: INTERVENTIONS:  - Monitor percentage of each meal consumed  - Identify factors contributing to decreased intake, treat as appropriate  - Assist with meals as needed  - Monitor I&O, WT and lab values  - Obtain nutritional consult as needed  - Optimize oral hygiene and moisture  - Encourage food from home; allow for food preferences  - Enhance eating environment  Outcome: Adequate for Discharge  Goal: Achieves appropriate nutritional intake (bariatric)  Description: INTERVENTIONS:  - Monitor for over-consumption  - Identify factors contributing to increased intake, treat as appropriate  - Monitor I&O, WT and lab values  - Obtain nutritional consult as needed  - Evaluate psychosocial factors contributing to over-consumption  Outcome: Adequate for Discharge

## 2022-04-21 ENCOUNTER — PATIENT OUTREACH (OUTPATIENT)
Dept: CASE MANAGEMENT | Age: 37
End: 2022-04-21

## 2022-09-26 ENCOUNTER — APPOINTMENT (OUTPATIENT)
Dept: URBAN - METROPOLITAN AREA CLINIC 244 | Age: 37
Setting detail: DERMATOLOGY
End: 2022-09-26

## 2022-09-26 DIAGNOSIS — L21.8 OTHER SEBORRHEIC DERMATITIS: ICD-10-CM

## 2022-09-26 DIAGNOSIS — L30.9 DERMATITIS, UNSPECIFIED: ICD-10-CM

## 2022-09-26 PROCEDURE — 99204 OFFICE O/P NEW MOD 45 MIN: CPT

## 2022-09-26 PROCEDURE — OTHER PRESCRIPTION: OTHER

## 2022-09-26 PROCEDURE — OTHER COUNSELING: OTHER

## 2022-09-26 RX ORDER — KETOCONAZOLE 20 MG/G
CREAM TOPICAL BID
Qty: 30 | Refills: 6 | Status: ERX | COMMUNITY
Start: 2022-09-26

## 2022-09-26 RX ORDER — TACROLIMUS 1 MG/G
OINTMENT TOPICAL
Qty: 60 | Refills: 0 | Status: ERX | COMMUNITY
Start: 2022-09-26

## 2022-09-26 ASSESSMENT — LOCATION DETAILED DESCRIPTION DERM
LOCATION DETAILED: LEFT CHIN
LOCATION DETAILED: LEFT INFERIOR MEDIAL MALAR CHEEK
LOCATION DETAILED: LEFT MEDIAL BUCCAL CHEEK
LOCATION DETAILED: RIGHT INFERIOR MEDIAL MALAR CHEEK
LOCATION DETAILED: RIGHT MEDIAL BUCCAL CHEEK

## 2022-09-26 ASSESSMENT — LOCATION SIMPLE DESCRIPTION DERM
LOCATION SIMPLE: RIGHT CHEEK
LOCATION SIMPLE: CHIN
LOCATION SIMPLE: LEFT CHEEK

## 2022-09-26 ASSESSMENT — LOCATION ZONE DERM: LOCATION ZONE: FACE

## 2023-05-25 ENCOUNTER — HOSPITAL ENCOUNTER (OUTPATIENT)
Age: 38
Discharge: HOME | End: 2023-05-25
Payer: COMMERCIAL

## 2023-05-25 ENCOUNTER — OFFICE VISIT (OUTPATIENT)
Dept: FAMILY MEDICINE CLINIC | Facility: CLINIC | Age: 38
End: 2023-05-25

## 2023-05-25 VITALS
BODY MASS INDEX: 26.43 KG/M2 | SYSTOLIC BLOOD PRESSURE: 100 MMHG | HEART RATE: 107 BPM | HEIGHT: 63 IN | WEIGHT: 149.19 LBS | DIASTOLIC BLOOD PRESSURE: 70 MMHG

## 2023-05-25 VITALS
SYSTOLIC BLOOD PRESSURE: 108 MMHG | DIASTOLIC BLOOD PRESSURE: 90 MMHG | OXYGEN SATURATION: 99 % | HEART RATE: 96 BPM | RESPIRATION RATE: 18 BRPM | TEMPERATURE: 98 F

## 2023-05-25 DIAGNOSIS — R22.2 NODULE OF CHEST WALL: Primary | ICD-10-CM

## 2023-05-25 DIAGNOSIS — L02.211 ABSCESS OF ABDOMINAL WALL: Primary | ICD-10-CM

## 2023-05-25 PROCEDURE — 3074F SYST BP LT 130 MM HG: CPT | Performed by: FAMILY MEDICINE

## 2023-05-25 PROCEDURE — 3078F DIAST BP <80 MM HG: CPT | Performed by: FAMILY MEDICINE

## 2023-05-25 PROCEDURE — 99202 OFFICE O/P NEW SF 15 MIN: CPT | Performed by: FAMILY MEDICINE

## 2023-05-25 PROCEDURE — 3008F BODY MASS INDEX DOCD: CPT | Performed by: FAMILY MEDICINE

## 2023-05-25 PROCEDURE — 99213 OFFICE O/P EST LOW 20 MIN: CPT | Performed by: NURSE PRACTITIONER

## 2023-05-25 RX ORDER — AMOXICILLIN AND CLAVULANATE POTASSIUM 875; 125 MG/1; MG/1
1 TABLET, FILM COATED ORAL 2 TIMES DAILY
Qty: 20 TABLET | Refills: 0 | Status: SHIPPED | OUTPATIENT
Start: 2023-05-25 | End: 2023-05-25 | Stop reason: ALTCHOICE

## 2023-05-25 RX ORDER — ACYCLOVIR 400 MG/1
400 TABLET ORAL
COMMUNITY
Start: 2023-05-24

## 2023-05-25 RX ORDER — CEPHALEXIN 500 MG/1
500 CAPSULE ORAL 3 TIMES DAILY
Qty: 30 CAPSULE | Refills: 0 | Status: SHIPPED | OUTPATIENT
Start: 2023-05-25

## 2023-05-25 NOTE — DISCHARGE INSTRUCTIONS
Please take antibiotic as prescribed.   Please call your gynecologist to schedule a follow-up appointment

## 2023-06-20 ENCOUNTER — HOSPITAL ENCOUNTER (EMERGENCY)
Facility: HOSPITAL | Age: 38
Discharge: HOME | End: 2023-06-20
Attending: EMERGENCY MEDICINE
Payer: COMMERCIAL

## 2023-06-20 ENCOUNTER — APPOINTMENT (OUTPATIENT)
Dept: GENERAL RADIOLOGY | Facility: HOSPITAL | Age: 38
End: 2023-06-20
Payer: COMMERCIAL

## 2023-06-20 VITALS
OXYGEN SATURATION: 98 % | HEART RATE: 100 BPM | DIASTOLIC BLOOD PRESSURE: 68 MMHG | WEIGHT: 140 LBS | BODY MASS INDEX: 24.8 KG/M2 | TEMPERATURE: 98 F | RESPIRATION RATE: 18 BRPM | HEIGHT: 63 IN | SYSTOLIC BLOOD PRESSURE: 100 MMHG

## 2023-06-20 DIAGNOSIS — R06.02 SHORTNESS OF BREATH: Primary | ICD-10-CM

## 2023-06-20 DIAGNOSIS — F41.9 ANXIETY: ICD-10-CM

## 2023-06-20 LAB
ALBUMIN SERPL-MCNC: 3.7 G/DL (ref 3.4–5)
ALBUMIN/GLOB SERPL: 0.8 {RATIO} (ref 1–2)
ALP LIVER SERPL-CCNC: 62 U/L
ALT SERPL-CCNC: 31 U/L
ANION GAP SERPL CALC-SCNC: 8 MMOL/L (ref 0–18)
AST SERPL-CCNC: 13 U/L (ref 15–37)
ATRIAL RATE: 77 BPM
B-HCG UR QL: NEGATIVE
BASOPHILS # BLD AUTO: 0.03 X10(3) UL (ref 0–0.2)
BASOPHILS NFR BLD AUTO: 0.3 %
BILIRUB SERPL-MCNC: 0.5 MG/DL (ref 0.1–2)
BILIRUB UR QL: NEGATIVE
BUN BLD-MCNC: 14 MG/DL (ref 7–18)
BUN/CREAT SERPL: 17.7 (ref 10–20)
CALCIUM BLD-MCNC: 9.4 MG/DL (ref 8.5–10.1)
CHLORIDE SERPL-SCNC: 107 MMOL/L (ref 98–112)
CLARITY UR: CLEAR
CO2 SERPL-SCNC: 23 MMOL/L (ref 21–32)
COLOR UR: YELLOW
CREAT BLD-MCNC: 0.79 MG/DL
D DIMER PPP FEU-MCNC: 0.28 UG/ML FEU (ref ?–0.5)
DEPRECATED RDW RBC AUTO: 39.9 FL (ref 35.1–46.3)
EOSINOPHIL # BLD AUTO: 0.13 X10(3) UL (ref 0–0.7)
EOSINOPHIL NFR BLD AUTO: 1.5 %
ERYTHROCYTE [DISTWIDTH] IN BLOOD BY AUTOMATED COUNT: 12.5 % (ref 11–15)
GFR SERPLBLD BASED ON 1.73 SQ M-ARVRAT: 99 ML/MIN/1.73M2 (ref 60–?)
GLOBULIN PLAS-MCNC: 4.4 G/DL (ref 2.8–4.4)
GLUCOSE BLD-MCNC: 114 MG/DL (ref 70–99)
GLUCOSE UR-MCNC: NEGATIVE MG/DL
HCT VFR BLD AUTO: 37.4 %
HGB BLD-MCNC: 12.6 G/DL
HGB UR QL STRIP.AUTO: NEGATIVE
IMM GRANULOCYTES # BLD AUTO: 0.02 X10(3) UL (ref 0–1)
IMM GRANULOCYTES NFR BLD: 0.2 %
KETONES UR-MCNC: NEGATIVE MG/DL
LEUKOCYTE ESTERASE UR QL STRIP.AUTO: NEGATIVE
LYMPHOCYTES # BLD AUTO: 3.12 X10(3) UL (ref 1–4)
LYMPHOCYTES NFR BLD AUTO: 36.3 %
MCH RBC QN AUTO: 29.6 PG (ref 26–34)
MCHC RBC AUTO-ENTMCNC: 33.7 G/DL (ref 31–37)
MCV RBC AUTO: 87.8 FL
MONOCYTES # BLD AUTO: 0.52 X10(3) UL (ref 0.1–1)
MONOCYTES NFR BLD AUTO: 6 %
NEUTROPHILS # BLD AUTO: 4.78 X10 (3) UL (ref 1.5–7.7)
NEUTROPHILS # BLD AUTO: 4.78 X10(3) UL (ref 1.5–7.7)
NEUTROPHILS NFR BLD AUTO: 55.7 %
NITRITE UR QL STRIP.AUTO: NEGATIVE
NT-PROBNP SERPL-MCNC: 12 PG/ML (ref ?–125)
OSMOLALITY SERPL CALC.SUM OF ELEC: 287 MOSM/KG (ref 275–295)
P AXIS: 59 DEGREES
P-R INTERVAL: 168 MS
PH UR: 5.5 [PH] (ref 5–8)
PLATELET # BLD AUTO: 306 10(3)UL (ref 150–450)
POTASSIUM SERPL-SCNC: 3.4 MMOL/L (ref 3.5–5.1)
PROT SERPL-MCNC: 8.1 G/DL (ref 6.4–8.2)
PROT UR-MCNC: NEGATIVE MG/DL
Q-T INTERVAL: 358 MS
QRS DURATION: 74 MS
QTC CALCULATION (BEZET): 405 MS
R AXIS: 30 DEGREES
RBC # BLD AUTO: 4.26 X10(6)UL
SODIUM SERPL-SCNC: 138 MMOL/L (ref 136–145)
SP GR UR STRIP: 1.02 (ref 1–1.03)
T AXIS: 29 DEGREES
TROPONIN I HIGH SENSITIVITY: <3 NG/L
UROBILINOGEN UR STRIP-ACNC: 0.2
VENTRICULAR RATE: 77 BPM
WBC # BLD AUTO: 8.6 X10(3) UL (ref 4–11)

## 2023-06-20 PROCEDURE — 93010 ELECTROCARDIOGRAM REPORT: CPT

## 2023-06-20 PROCEDURE — 85379 FIBRIN DEGRADATION QUANT: CPT | Performed by: EMERGENCY MEDICINE

## 2023-06-20 PROCEDURE — 81003 URINALYSIS AUTO W/O SCOPE: CPT | Performed by: EMERGENCY MEDICINE

## 2023-06-20 PROCEDURE — 36415 COLL VENOUS BLD VENIPUNCTURE: CPT

## 2023-06-20 PROCEDURE — 84484 ASSAY OF TROPONIN QUANT: CPT | Performed by: EMERGENCY MEDICINE

## 2023-06-20 PROCEDURE — 93005 ELECTROCARDIOGRAM TRACING: CPT

## 2023-06-20 PROCEDURE — 99285 EMERGENCY DEPT VISIT HI MDM: CPT

## 2023-06-20 PROCEDURE — 94640 AIRWAY INHALATION TREATMENT: CPT

## 2023-06-20 PROCEDURE — 94799 UNLISTED PULMONARY SVC/PX: CPT

## 2023-06-20 PROCEDURE — 71045 X-RAY EXAM CHEST 1 VIEW: CPT | Performed by: EMERGENCY MEDICINE

## 2023-06-20 PROCEDURE — 99284 EMERGENCY DEPT VISIT MOD MDM: CPT

## 2023-06-20 PROCEDURE — 85025 COMPLETE CBC W/AUTO DIFF WBC: CPT | Performed by: EMERGENCY MEDICINE

## 2023-06-20 PROCEDURE — 83880 ASSAY OF NATRIURETIC PEPTIDE: CPT | Performed by: EMERGENCY MEDICINE

## 2023-06-20 PROCEDURE — 81025 URINE PREGNANCY TEST: CPT

## 2023-06-20 PROCEDURE — 80053 COMPREHEN METABOLIC PANEL: CPT | Performed by: EMERGENCY MEDICINE

## 2023-06-20 RX ORDER — IPRATROPIUM BROMIDE AND ALBUTEROL SULFATE 2.5; .5 MG/3ML; MG/3ML
3 SOLUTION RESPIRATORY (INHALATION) ONCE
Status: COMPLETED | OUTPATIENT
Start: 2023-06-20 | End: 2023-06-20

## 2023-06-20 NOTE — ED QUICK NOTES
Rounding completed    No complaints at this time  ER MD AT [address] [place] [address]   Elimination assistance offered  No additional needs at this time  Call light within reach, will update patient with more information  Will continue to monitor\

## 2023-06-20 NOTE — ED INITIAL ASSESSMENT (HPI)
Patient is here with trouble breathing x2 weeks, worse at night. Patient has history of asthma. Patient also complains of bilateral chest/back pain. Patient is panting in triage. Lungs clear. Patient denies recent traveling or being on birth control pills.

## 2023-06-22 ENCOUNTER — OFFICE VISIT (OUTPATIENT)
Dept: ENDOCRINOLOGY CLINIC | Facility: CLINIC | Age: 38
End: 2023-06-22

## 2023-06-22 VITALS
DIASTOLIC BLOOD PRESSURE: 67 MMHG | BODY MASS INDEX: 26.96 KG/M2 | HEART RATE: 78 BPM | SYSTOLIC BLOOD PRESSURE: 100 MMHG | WEIGHT: 152.19 LBS | HEIGHT: 63 IN

## 2023-06-22 DIAGNOSIS — L65.9 HAIR LOSS: Primary | ICD-10-CM

## 2023-06-22 DIAGNOSIS — R68.82 DECREASED LIBIDO: ICD-10-CM

## 2023-06-22 DIAGNOSIS — N92.6 IRREGULAR PERIODS/MENSTRUAL CYCLES: ICD-10-CM

## 2023-06-22 DIAGNOSIS — R53.82 CHRONIC FATIGUE: ICD-10-CM

## 2023-06-22 PROCEDURE — 99203 OFFICE O/P NEW LOW 30 MIN: CPT | Performed by: INTERNAL MEDICINE

## 2023-06-22 PROCEDURE — 3078F DIAST BP <80 MM HG: CPT | Performed by: INTERNAL MEDICINE

## 2023-06-22 PROCEDURE — 3008F BODY MASS INDEX DOCD: CPT | Performed by: INTERNAL MEDICINE

## 2023-06-22 PROCEDURE — 3074F SYST BP LT 130 MM HG: CPT | Performed by: INTERNAL MEDICINE

## 2023-06-30 ENCOUNTER — OFFICE VISIT (OUTPATIENT)
Dept: OTOLARYNGOLOGY | Facility: CLINIC | Age: 38
End: 2023-06-30

## 2023-06-30 DIAGNOSIS — H60.502 ACUTE OTITIS EXTERNA OF LEFT EAR, UNSPECIFIED TYPE: ICD-10-CM

## 2023-06-30 DIAGNOSIS — M26.609 TMJ (TEMPOROMANDIBULAR JOINT DISORDER): Primary | ICD-10-CM

## 2023-06-30 PROCEDURE — 92504 EAR MICROSCOPY EXAMINATION: CPT | Performed by: STUDENT IN AN ORGANIZED HEALTH CARE EDUCATION/TRAINING PROGRAM

## 2023-06-30 PROCEDURE — 99214 OFFICE O/P EST MOD 30 MIN: CPT | Performed by: STUDENT IN AN ORGANIZED HEALTH CARE EDUCATION/TRAINING PROGRAM

## 2023-06-30 RX ORDER — TOBRAMYCIN AND DEXAMETHASONE 3; 1 MG/ML; MG/ML
SUSPENSION/ DROPS OPHTHALMIC
Qty: 10 ML | Refills: 0 | Status: SHIPPED | OUTPATIENT
Start: 2023-06-30

## 2023-06-30 RX ORDER — MELOXICAM 15 MG/1
15 TABLET ORAL DAILY
Qty: 30 TABLET | Refills: 0 | Status: SHIPPED | OUTPATIENT
Start: 2023-06-30 | End: 2023-07-30

## 2023-06-30 RX ORDER — MOMETASONE FUROATE 1 MG/G
1 OINTMENT TOPICAL DAILY
Qty: 1 EACH | Refills: 0 | Status: SHIPPED | OUTPATIENT
Start: 2023-06-30

## 2023-06-30 RX ORDER — CYCLOBENZAPRINE HCL 5 MG
5 TABLET ORAL NIGHTLY
Qty: 30 TABLET | Refills: 0 | Status: SHIPPED | OUTPATIENT
Start: 2023-06-30 | End: 2023-07-30

## 2023-10-25 ENCOUNTER — OFFICE VISIT (OUTPATIENT)
Dept: OBGYN CLINIC | Facility: CLINIC | Age: 38
End: 2023-10-25

## 2023-10-25 VITALS
WEIGHT: 135 LBS | DIASTOLIC BLOOD PRESSURE: 75 MMHG | HEART RATE: 108 BPM | BODY MASS INDEX: 24 KG/M2 | SYSTOLIC BLOOD PRESSURE: 109 MMHG

## 2023-10-25 DIAGNOSIS — N94.10 DYSPAREUNIA IN FEMALE: ICD-10-CM

## 2023-10-25 DIAGNOSIS — R10.2 PELVIC PAIN IN FEMALE: ICD-10-CM

## 2023-10-25 DIAGNOSIS — Z01.419 ENCOUNTER FOR WELL WOMAN EXAM: Primary | ICD-10-CM

## 2023-10-25 DIAGNOSIS — N94.6 DYSMENORRHEA: ICD-10-CM

## 2023-10-25 DIAGNOSIS — N92.0 MENORRHAGIA WITH REGULAR CYCLE: ICD-10-CM

## 2023-10-25 PROCEDURE — 3078F DIAST BP <80 MM HG: CPT | Performed by: OBSTETRICS & GYNECOLOGY

## 2023-10-25 PROCEDURE — 99395 PREV VISIT EST AGE 18-39: CPT | Performed by: OBSTETRICS & GYNECOLOGY

## 2023-10-25 PROCEDURE — 90471 IMMUNIZATION ADMIN: CPT | Performed by: OBSTETRICS & GYNECOLOGY

## 2023-10-25 PROCEDURE — 90686 IIV4 VACC NO PRSV 0.5 ML IM: CPT | Performed by: OBSTETRICS & GYNECOLOGY

## 2023-10-25 PROCEDURE — 3074F SYST BP LT 130 MM HG: CPT | Performed by: OBSTETRICS & GYNECOLOGY

## 2023-10-26 LAB — HPV I/H RISK 1 DNA SPEC QL NAA+PROBE: NEGATIVE

## 2024-01-10 ENCOUNTER — HOSPITAL ENCOUNTER (OUTPATIENT)
Dept: ULTRASOUND IMAGING | Facility: HOSPITAL | Age: 39
Discharge: HOME | End: 2024-01-10
Attending: OBSTETRICS & GYNECOLOGY
Payer: COMMERCIAL

## 2024-01-10 DIAGNOSIS — N94.6 DYSMENORRHEA: ICD-10-CM

## 2024-01-10 DIAGNOSIS — N94.10 DYSPAREUNIA IN FEMALE: ICD-10-CM

## 2024-01-10 DIAGNOSIS — N92.0 MENORRHAGIA WITH REGULAR CYCLE: ICD-10-CM

## 2024-01-10 DIAGNOSIS — R10.2 PELVIC PAIN IN FEMALE: ICD-10-CM

## 2024-01-10 PROCEDURE — 76830 TRANSVAGINAL US NON-OB: CPT | Performed by: OBSTETRICS & GYNECOLOGY

## 2024-01-10 PROCEDURE — 76856 US EXAM PELVIC COMPLETE: CPT | Performed by: OBSTETRICS & GYNECOLOGY

## 2024-01-12 PROBLEM — O34.219 PREVIOUS CESAREAN DELIVERY, ANTEPARTUM CONDITION OR COMPLICATION (HCC): Status: RESOLVED | Noted: 2021-01-05 | Resolved: 2024-01-12

## 2024-01-12 PROBLEM — O34.219 PREVIOUS CESAREAN DELIVERY, ANTEPARTUM CONDITION OR COMPLICATION: Status: RESOLVED | Noted: 2021-01-05 | Resolved: 2024-01-12

## 2024-01-12 PROBLEM — O09.529 SUPERVISION OF ELDERLY MULTIGRAVIDA, UNSPECIFIED TRIMESTER (HCC): Status: RESOLVED | Noted: 2020-12-08 | Resolved: 2024-01-12

## 2024-01-12 PROBLEM — O09.529 SUPERVISION OF ELDERLY MULTIGRAVIDA, UNSPECIFIED TRIMESTER: Status: RESOLVED | Noted: 2020-12-08 | Resolved: 2024-01-12

## 2024-01-15 ENCOUNTER — OFFICE VISIT (OUTPATIENT)
Dept: OBGYN CLINIC | Facility: CLINIC | Age: 39
End: 2024-01-15

## 2024-01-15 VITALS
BODY MASS INDEX: 22.15 KG/M2 | DIASTOLIC BLOOD PRESSURE: 73 MMHG | WEIGHT: 125 LBS | HEIGHT: 63 IN | SYSTOLIC BLOOD PRESSURE: 106 MMHG | HEART RATE: 106 BPM

## 2024-01-15 DIAGNOSIS — N94.89 ENDOMETRIAL MASS: ICD-10-CM

## 2024-01-15 DIAGNOSIS — R10.2 PELVIC PAIN: Primary | ICD-10-CM

## 2024-01-15 DIAGNOSIS — N94.10 DYSPAREUNIA IN FEMALE: ICD-10-CM

## 2024-01-15 DIAGNOSIS — N94.6 DYSMENORRHEA: ICD-10-CM

## 2024-01-15 DIAGNOSIS — N92.0 MENORRHAGIA WITH REGULAR CYCLE: ICD-10-CM

## 2024-01-15 PROBLEM — O47.03 PRETERM UTERINE CONTRACTIONS, ANTEPARTUM, THIRD TRIMESTER (HCC): Status: RESOLVED | Noted: 2021-04-13 | Resolved: 2024-01-15

## 2024-01-15 PROBLEM — R82.71 GBS BACTERIURIA: Status: RESOLVED | Noted: 2021-04-04 | Resolved: 2024-01-15

## 2024-01-15 PROBLEM — O26.892 PELVIC PAIN AFFECTING PREGNANCY IN SECOND TRIMESTER, ANTEPARTUM (HCC): Status: RESOLVED | Noted: 2021-03-05 | Resolved: 2024-01-15

## 2024-01-15 PROBLEM — O26.892 PELVIC PAIN AFFECTING PREGNANCY IN SECOND TRIMESTER, ANTEPARTUM: Status: RESOLVED | Noted: 2021-03-05 | Resolved: 2024-01-15

## 2024-01-15 PROBLEM — O99.891 BACK PAIN AFFECTING PREGNANCY IN SECOND TRIMESTER: Status: RESOLVED | Noted: 2021-03-05 | Resolved: 2024-01-15

## 2024-01-15 PROBLEM — O47.03 PRETERM UTERINE CONTRACTIONS, ANTEPARTUM, THIRD TRIMESTER: Status: RESOLVED | Noted: 2021-04-13 | Resolved: 2024-01-15

## 2024-01-15 PROBLEM — M54.9 BACK PAIN AFFECTING PREGNANCY IN SECOND TRIMESTER: Status: RESOLVED | Noted: 2021-03-05 | Resolved: 2024-01-15

## 2024-01-15 PROBLEM — O99.891 BACK PAIN AFFECTING PREGNANCY IN SECOND TRIMESTER (HCC): Status: RESOLVED | Noted: 2021-03-05 | Resolved: 2024-01-15

## 2024-01-15 PROBLEM — M54.9 BACK PAIN AFFECTING PREGNANCY IN SECOND TRIMESTER (HCC): Status: RESOLVED | Noted: 2021-03-05 | Resolved: 2024-01-15

## 2024-01-15 NOTE — PROGRESS NOTES
HPI:    Patient ID: Monika Monae is a 38 year old year old female.    HPI  Gyne problem  38-year-old  2 para 1-1-0-2 last menstrual period .  Complains of a 13-month history of heavy and painful menses coming every 35 days lasting 6 days with 3 heavy days with excruciating pain and cramping.  Has also developed despite dyspareunia to the midline into the left.  Her heavy menses for the 3 days causes her to require changing every 15 minutes.   has had to miss work due to her pain.  Describes the pain at a level of 9 out of 10.  Takes Advil 400 mg and Midol as well.  Counseled on increasing the doses.  Reviewed her pelvic ultrasound and symptoms.  Prior to a year ago her menses would be normal and flow last for 4 days with 1 moderate day only and cramping of 2 out of 10.  So she has had a significant change in turn for the worst in the past month.    PROCEDURE: US PELVIS TRANSABDOMINAL AND TRANSVAGINAL (CPT=76856/56431)     COMPARISON: [facility], CT ABDOMEN+PELVIS (CPT=74176), 2022, 5:12 PM.  [facility], MRI ABDOMEN/PELVIS (CPT=74181/32400), 3/05/2021, 12:18 PM.     INDICATIONS: Pelvic pain in female N94.10 Dyspareunia in female N92.0 Menorrhagia with regular cycle N94.6 Dysmenorrhea     TECHNIQUE: Pelvic ultrasound using transabdominal and transvaginal technique.  A transvaginal scan was performed for improved visualization of anatomy.     FINDINGS:  UTERUS:   Normal size.       ENDOMETRIUM: Normal in thickness. No fluid or mass in the endometrial canal.    MYOMETRIUM: Normal echogenicity.  Possible small fibroid measuring 1.0 x 0.6 x 0.9 cm.     CERVIX: Unremarkable.     OVARIES AND ADNEXA:     RIGHT:   Normal size and appearance with no masses.    LEFT:   Normal size and appearance.  A complex cyst measures 1.3 x 1.4 x 1.4 cm.     CUL-DE-SAC:   Normal.  No free fluid or mass.    OTHER: Negative.  Bladder appears normal.        Patient  Characteristics:       : 2       Para: 2  Summary:       Last Menstrual Period: 2023       Pelvis and Uterus:           Uterus Length: 8.80 cm           Uterus Height: 5.48 cm           Uterus Width: 5.88 cm           Endometrium Thickness: 1.46 cm       Findings:           Ovary:              Right Ovary Length: 2.77 cm              Right Ovary Height: 2.13 cm              Right Ovary Width: 2.16 cm              Left Ovary Length: 2.65 cm              Left Ovary Height: 2.69 cm              Left Ovary Width: 2.48 cm                  Impression   CONCLUSION:     Normal endometrium.  There is a possible small fibroid measuring 1.0 x 0.6 x 0.9 cm.     The right ovary is unremarkable.  There is a complex cyst in the left ovary measuring 1.3 x 1.4 x 1.4 cm, possibly a hemorrhagic cyst.  Recommend follow-up ultrasound in 3 months.           Dictated by (CST): Dima Dasilva MD on 2024 at 10:16 AM         Review of Systems   Constitutional: Negative.    Cardiovascular: Negative.    Gastrointestinal: Negative.    Genitourinary: Negative.    Skin: Negative.    Neurological: Negative.    Psychiatric/Behavioral: Negative.     All other systems reviewed and are negative.       No current outpatient medications on file.       Physical Exam     Vitals: /73   Pulse 106   Ht 5' 3\" (1.6 m)   Wt 125 lb (56.7 kg)   LMP 2024 (Approximate)   Breastfeeding No   BMI 22.14 kg/m²   def    ASSESSMENT/PLAN:  Menorrhagia with regular cycle  Severe dysmenorrhea  Pelvic pain  Deep dyspareunia  Small complex cyst the left ovary/follicle 1.4 cm  Endometrial mass-submucous fibroid versus polyp.  Recommend saline sonography to delineate better and characterize better.  If confirmed, may need operative hysteroscopy with TruClear and D&C.  May need laparoscopy to rule out endometriosis.  Counseled on fibroids, endometriosis, evaluation of heavy menses.  Outpatient Encounter Medications as of 1/15/2024   Medication  Sig Dispense Refill    [DISCONTINUED] tobramycin-dexamethasone 0.3-0.1 % Ophthalmic Suspension Apply 5 drops twice a day to left ears for 7 days. 10 mL 0    [DISCONTINUED] mometasone 0.1 % External Ointment Apply 1 Application topically daily. Apply to entrance of ear canal on the left before sleep 1 each 0    [DISCONTINUED] ipratropium-albuterol  MCG/ACT Inhalation Aero Soln Inhale 1 puff into the lungs 4 (four) times daily. Please dispense with spacer chamber (Patient not taking: Reported on 6/30/2023) 1 each 0    [DISCONTINUED] acyclovir 400 MG Oral Tab Take 1 tablet (400 mg total) by mouth 5 (five) times daily. (Patient not taking: Reported on 6/30/2023)      [DISCONTINUED] cephalexin 500 MG Oral Cap Take 1 capsule (500 mg total) by mouth 3 (three) times daily. (Patient not taking: Reported on 6/30/2023) 30 capsule 0    [DISCONTINUED] mupirocin 2 % External Ointment Apply 1 Application topically 3 (three) times daily. (Patient not taking: Reported on 6/30/2023) 15 g 0     No facility-administered encounter medications on file as of 1/15/2024.

## 2024-01-25 ENCOUNTER — OFFICE VISIT (OUTPATIENT)
Dept: OBGYN CLINIC | Facility: CLINIC | Age: 39
End: 2024-01-25
Payer: COMMERCIAL

## 2024-01-25 VITALS
DIASTOLIC BLOOD PRESSURE: 82 MMHG | SYSTOLIC BLOOD PRESSURE: 115 MMHG | WEIGHT: 123 LBS | HEART RATE: 111 BPM | HEIGHT: 63 IN | BODY MASS INDEX: 21.79 KG/M2

## 2024-01-25 DIAGNOSIS — E28.2 PCOS (POLYCYSTIC OVARIAN SYNDROME): ICD-10-CM

## 2024-01-25 DIAGNOSIS — Z01.818 PREPROCEDURAL EXAMINATION: ICD-10-CM

## 2024-01-25 DIAGNOSIS — N94.10 DYSPAREUNIA IN FEMALE: ICD-10-CM

## 2024-01-25 DIAGNOSIS — N94.6 DYSMENORRHEA: Primary | ICD-10-CM

## 2024-01-25 DIAGNOSIS — N92.0 MENORRHAGIA WITH REGULAR CYCLE: ICD-10-CM

## 2024-01-25 DIAGNOSIS — N80.03 ADENOMYOSIS: ICD-10-CM

## 2024-01-25 PROBLEM — N94.89 ENDOMETRIAL MASS: Status: RESOLVED | Noted: 2024-01-15 | Resolved: 2024-01-25

## 2024-01-25 PROBLEM — O14.13 SEVERE PRE-ECLAMPSIA IN THIRD TRIMESTER (HCC): Status: RESOLVED | Noted: 2021-05-10 | Resolved: 2024-01-25

## 2024-01-25 PROBLEM — O14.13 SEVERE PRE-ECLAMPSIA IN THIRD TRIMESTER: Status: RESOLVED | Noted: 2021-05-10 | Resolved: 2024-01-25

## 2024-01-25 LAB
CONTROL LINE PRESENT WITH A CLEAR BACKGROUND (YES/NO): YES YES/NO
KIT LOT #: NORMAL NUMERIC
PREGNANCY TEST, URINE: NEGATIVE

## 2024-01-25 PROCEDURE — 3079F DIAST BP 80-89 MM HG: CPT | Performed by: OBSTETRICS & GYNECOLOGY

## 2024-01-25 PROCEDURE — 99213 OFFICE O/P EST LOW 20 MIN: CPT | Performed by: OBSTETRICS & GYNECOLOGY

## 2024-01-25 PROCEDURE — 3008F BODY MASS INDEX DOCD: CPT | Performed by: OBSTETRICS & GYNECOLOGY

## 2024-01-25 PROCEDURE — 81025 URINE PREGNANCY TEST: CPT | Performed by: OBSTETRICS & GYNECOLOGY

## 2024-01-25 PROCEDURE — 76831 ECHO EXAM UTERUS: CPT | Performed by: OBSTETRICS & GYNECOLOGY

## 2024-01-25 PROCEDURE — 3074F SYST BP LT 130 MM HG: CPT | Performed by: OBSTETRICS & GYNECOLOGY

## 2024-01-25 PROCEDURE — 58340 CATHETER FOR HYSTEROGRAPHY: CPT | Performed by: OBSTETRICS & GYNECOLOGY

## 2024-01-25 RX ORDER — DROSPIRENONE AND ETHINYL ESTRADIOL 0.02-3(28)
1 KIT ORAL DAILY
Qty: 84 TABLET | Refills: 0 | Status: SHIPPED | OUTPATIENT
Start: 2024-01-25 | End: 2025-01-24

## 2024-01-25 NOTE — PROGRESS NOTES
Subjective:     Patient ID: Monika Monae is a 38 year old female.    HPI  Gyne follow up  For pelvic ultrasound/SIS  Saline sonography pelvic ultrasound shows a normal-appearing endometrial cavity with no endometrial lesions.  There is a myometrial cyst of 6 x 7 mm and heterogeneity with echogenic foci consistent with adenomyosis.  Bilateral polycystic ovaries noted.  Endometrial thickness of 4 mm.  Lower uterine segment with triangulated pocket from prior  section skin thinning with 2.2 mm of thickness.  Significantly thin.  Would not be a appropriate candidate for endometrial ablation.  Counseled patient on the findings of adenomyosis and PCOS.  Recommended low-dose oral contraceptives which she has used in the past.  Will prescribe for 3 months and reassess after that time.  History/Other:   Review of Systems  No current outpatient medications on file.     Allergies:No Known Allergies    Past Medical History:   Diagnosis Date    ADHD     Anxiety     Chronic post-traumatic stress disorder (PTSD)     Decorative tattoo     Depression     Gestational diabetes mellitus (GDM) in third trimester     History of anorexia nervosa       Past Surgical History:   Procedure Laterality Date    BREAST PROSTHESIS, SILICONE OR EQUAL, WITH INTEGRAL ADHESIVE Bilateral     saline implants                   Family History   Problem Relation Age of Onset    Other (Other) Father          of MVA    Stroke Father     Personality Disorder Father     Hypertension Mother     Diabetes Mother     Personality Disorder Mother     Diabetes Maternal Grandmother     Stroke Maternal Grandfather     Other (systemic lupus) Sister 40    Other (Other) Cousin 35    Breast Cancer Neg     Ovarian Cancer Neg     Uterine Cancer Neg     Colon Cancer Neg       Social History:   Social History     Socioeconomic History    Marital status:    Tobacco Use    Smoking status: Former     Packs/day: 0.25     Years:  16.00     Additional pack years: 0.00     Total pack years: 4.00     Types: Cigarettes    Smokeless tobacco: Never   Vaping Use    Vaping Use: Every day    Substances: Nicotine   Substance and Sexual Activity    Alcohol use: Yes     Comment: per pt socially     Drug use: Not Currently     Types: Cannabis     Comment: \"doesn't work for her\"        Objective:   Physical Exam    Assessment & Plan:   1. Dysmenorrhea    2. Dyspareunia in female    3. Menorrhagia with regular cycle    4. Preprocedural examination        Orders Placed This Encounter   Procedures    POC Urine pregnancy test [54560]       Meds This Visit:  Requested Prescriptions      No prescriptions requested or ordered in this encounter       Imaging & Referrals:  None

## 2024-01-25 NOTE — PROCEDURES
Saline sonography  Transvaginal pelvic ultrasound done  Sterile speculum placed vaginally and cervix exposed.  Cervix and vagina prepped with Betadine 3 times.  Cook's catheter passed transcervically and speculum withdrawn.  Transvaginal pelvic ultrasound performed in real-time with the aforementioned findings noted  No cavitary lesions noted.  Triangulation of the lower uterine segment at the site of prior C-sections with thin anterior myometrium of 2.2 mm.  No evidence for submucous fibroids or endometrial polyps.  Endometrial thickness of 4 mm combined for both walls.  Ultrasound ended, catheter withdrawn, procedure tolerated well.

## 2024-04-11 ENCOUNTER — PATIENT MESSAGE (OUTPATIENT)
Dept: OBGYN CLINIC | Facility: CLINIC | Age: 39
End: 2024-04-11

## 2024-04-11 RX ORDER — ELAGOLIX 150 MG/1
150 TABLET, FILM COATED ORAL DAILY
Qty: 30 TABLET | Refills: 2 | Status: SHIPPED | OUTPATIENT
Start: 2024-04-11 | End: 2024-05-11

## 2024-04-11 NOTE — TELEPHONE ENCOUNTER
Yes we can try a different treatment.  Orilissa is used to treat endometriosis/adenomyosis related pelvic pain and painful intercourse.  She can use it for three months and then follow up with me.  Will ERx Orilissa 150 mg PO daily. (Start within 7 days from start of menses)  As it lowers estrogen hormone levels, side effects can include hot flashes or night sweats; emotional changes.  She should monitor herself for low mood, depression and seek help for any severe symptoms or ER for suicidality.  She should note improvement after 4-6 weeks.

## 2024-04-11 NOTE — TELEPHONE ENCOUNTER
From: Monika Monae  To: Yobani Gilbert  Sent: 4/11/2024 10:59 AM CDT  Subject: Birth control pill update     Hi Dr. Gilbert,    I completed 1.5 months of the birth control but had to stop taking it recently. I started to feel extremely depressed/suicidal on it and couldn’t take it anymore. Everything in my life has remained the same - the only thing different was taking the birth control. Things have been feeling a lot better emotionally/mentally since I’ve stopped. However, lower abdomen pain near my ovaries have intensified as well as continued pain during intercourse. Are there any alternative pills or options I can take that have little or no risk of setting off my depression but will help with the physical pain in abdomen and during intercourse?    Thank you,  Monika

## 2024-04-25 ENCOUNTER — OFFICE VISIT (OUTPATIENT)
Dept: OBGYN CLINIC | Facility: CLINIC | Age: 39
End: 2024-04-25
Payer: COMMERCIAL

## 2024-04-25 VITALS — SYSTOLIC BLOOD PRESSURE: 95 MMHG | DIASTOLIC BLOOD PRESSURE: 68 MMHG

## 2024-04-25 DIAGNOSIS — N80.03 ADENOMYOSIS: ICD-10-CM

## 2024-04-25 DIAGNOSIS — N94.10 DYSPAREUNIA IN FEMALE: ICD-10-CM

## 2024-04-25 DIAGNOSIS — N92.0 MENORRHAGIA WITH REGULAR CYCLE: ICD-10-CM

## 2024-04-25 DIAGNOSIS — N94.6 DYSMENORRHEA: Primary | ICD-10-CM

## 2024-04-25 PROBLEM — Z01.818 PREPROCEDURAL EXAMINATION: Status: RESOLVED | Noted: 2024-01-25 | Resolved: 2024-04-25

## 2024-04-25 PROCEDURE — 3078F DIAST BP <80 MM HG: CPT | Performed by: OBSTETRICS & GYNECOLOGY

## 2024-04-25 PROCEDURE — 99213 OFFICE O/P EST LOW 20 MIN: CPT | Performed by: OBSTETRICS & GYNECOLOGY

## 2024-04-25 PROCEDURE — 3074F SYST BP LT 130 MM HG: CPT | Performed by: OBSTETRICS & GYNECOLOGY

## 2024-04-25 NOTE — PROGRESS NOTES
Subjective:   Patient ID: Monika Monae is a 38 year old female.    HPI  GYN follow-up  Patient tried oral contraceptives for a month and a half but developed of severe depression and had to discontinue.  We discussed starting Orilissa at lower dose to try to help her with her symptoms of dysmenorrhea, pelvic pain and dyspareunia.  She did not start Orilissa out of concern for causing severe depression or emotional problems.  She is interested in a nonhormonal option.  She is requesting a surgical option.  We discussed hysterectomy for definitive surgical treatment of her heavy menses, dysmenorrhea, pelvic pain and findings of adenomyosis.  Counseled that the ovaries would not need to be removed as they appeared normal on ultrasound.  Provided with information patient will be considering.  She does not desire any future pregnancies.  States that her periods have continued to be exceedingly heavy with changing pad and tampon every 1/2 hour for 3 days and heavy bleeding overnight as well.  Strong pain and cramping as well  History/Other:   Review of Systems  Current Outpatient Medications   Medication Sig Dispense Refill    Elagolix Sodium (ORILISSA) 150 MG Oral Tab Take 150 mg by mouth daily. (Patient not taking: Reported on 2024) 30 tablet 2     Allergies:No Known Allergies    Objective:   Physical Exam  Abdomen soft, nontender, scaphoid  Old healed low transverse  Pfannenstiel incisions  Assessment & Plan:   Severe dysmenorrhea, heavy menses, pelvic pain and dyspareunia  Intolerant to oral contraceptives which produced severe depression which she had not had for 5 years.  Considering definitive surgical treatment with ANVYA.

## 2024-05-13 ENCOUNTER — TELEPHONE (OUTPATIENT)
Dept: OBGYN CLINIC | Facility: CLINIC | Age: 39
End: 2024-05-13

## 2024-05-13 DIAGNOSIS — N92.0 MENORRHAGIA WITH REGULAR CYCLE: Primary | ICD-10-CM

## 2024-05-13 DIAGNOSIS — N94.6 DYSMENORRHEA: ICD-10-CM

## 2024-05-13 NOTE — TELEPHONE ENCOUNTER
Please schedule the following surgery:    Procedure: Total abdominal hysterectomy  Assist: (Y/N or none) yes  Date: To be determined  Dx: Menorrhagia with regular cycle; dysmenorrhea; intolerance to hormones  Pre-op appt: (Y/N or n/a) yes please schedule  Admission type: (IN/OUT/OBVS) and  Department of discharge(SDS/Floor): Floor  Expected length of stay: 1 night  Procedure length time (please enter amount you are requesting): 2 hours  Recovery time (patients always ask): 4 weeks  Medical Clearance: (Y/N) no

## 2024-10-03 ENCOUNTER — OFFICE VISIT (OUTPATIENT)
Dept: ENDOCRINOLOGY CLINIC | Facility: CLINIC | Age: 39
End: 2024-10-03

## 2024-10-03 VITALS
HEIGHT: 63 IN | HEART RATE: 87 BPM | DIASTOLIC BLOOD PRESSURE: 64 MMHG | WEIGHT: 119 LBS | SYSTOLIC BLOOD PRESSURE: 98 MMHG | BODY MASS INDEX: 21.09 KG/M2

## 2024-10-03 DIAGNOSIS — R73.09 DYSGLYCEMIA: ICD-10-CM

## 2024-10-03 DIAGNOSIS — R53.82 CHRONIC FATIGUE: ICD-10-CM

## 2024-10-03 DIAGNOSIS — N92.6 IRREGULAR PERIODS/MENSTRUAL CYCLES: ICD-10-CM

## 2024-10-03 DIAGNOSIS — L65.9 HAIR LOSS: Primary | ICD-10-CM

## 2024-10-03 PROCEDURE — 3074F SYST BP LT 130 MM HG: CPT | Performed by: INTERNAL MEDICINE

## 2024-10-03 PROCEDURE — 3008F BODY MASS INDEX DOCD: CPT | Performed by: INTERNAL MEDICINE

## 2024-10-03 PROCEDURE — 3078F DIAST BP <80 MM HG: CPT | Performed by: INTERNAL MEDICINE

## 2024-10-03 PROCEDURE — 99214 OFFICE O/P EST MOD 30 MIN: CPT | Performed by: INTERNAL MEDICINE

## 2024-10-03 NOTE — PROGRESS NOTES
Follow-up - Reason for Visit:  Kellie-menopausal symptoms  Requesting Physician: Self-referral    CHIEF COMPLAINT:    Chief Complaint   Patient presents with    Fatigue     Pt here r/o perimenopause, pt c/o fatigue hair loss, night sweats     Other     Elevated sugar, while fasting, hx of family diabetes         HISTORY OF PRESENT ILLNESS:   Monika Monae is a 38 year old female who presents with no libido since her last delivery 2 years ago.     She has also had irregular menstrual cycles as well as hair loss. She states that both pregnancies were 'surprises'. She occasionally has warning symptoms but not all the time.     She has fatigue as well.    She takes sema- glutide from a med-spa. She states that she has had some yeast infections, and also had some unusual body odor.       PAST MEDICAL HISTORY:   Past Medical History:    ADHD    Anxiety    Chronic post-traumatic stress disorder (PTSD)    Decorative tattoo    Depression    Gestational diabetes mellitus (GDM) in third trimester (HCC)    History of anorexia nervosa       PAST SURGICAL HISTORY:   Past Surgical History:   Procedure Laterality Date    Breast prosthesis, silicone or equal, with integral adhesive Bilateral     saline implants                    SOCIAL HISTORY:    Social History     Socioeconomic History    Marital status:    Tobacco Use    Smoking status: Former     Current packs/day: 0.25     Average packs/day: 0.3 packs/day for 16.0 years (4.0 ttl pk-yrs)     Types: Cigarettes    Smokeless tobacco: Never   Vaping Use    Vaping status: Former    Substances: Nicotine   Substance and Sexual Activity    Alcohol use: Yes     Comment: per pt socially     Drug use: Not Currently     Types: Cannabis     Comment: \"doesn't work for her\"       FAMILY HISTORY:   Family History   Problem Relation Age of Onset    Other (Other) Father          of MVA    Stroke Father     Personality Disorder Father     Hypertension Mother      Diabetes Mother     Personality Disorder Mother     Diabetes Maternal Grandmother     Stroke Maternal Grandfather     Other (systemic lupus) Sister 40    Other (Other) Cousin 35    Breast Cancer Neg     Ovarian Cancer Neg     Uterine Cancer Neg     Colon Cancer Neg        CURRENT MEDICATIONS:    No current outpatient medications on file.       ALLERGIES:  No Known Allergies      ASSESSMENTS:     REVIEW OF SYSTEMS:  Constitutional: Negative for: weight change, fever, fatigue, cold/heat intolerance  Eyes: Negative for:  Visual changes, proptosis, blurring  ENT: Negative for:  dysphagia, neck swelling, dysphonia  Respiratory: Negative for:  dyspnea, cough  Cardiovascular: Negative for:  chest pain, palpitations, orthopnea  GI: Negative for:  abdominal pain, nausea, vomiting, diarrhea, constipation, bleeding  Neurology: Negative for: headache, numbness, weakness  Genito-Urinary: Negative for: dysuria, frequency  Psychiatric: Negative for:  depression, anxiety  Hematology/Lymphatics: Negative for: bruising, lower extremity edema  Endocrine: Negative for: polyuria, polydypsia  Skin: Negative for: rash, blister, cellulitis,      PHYSICAL EXAM:   Vitals:    10/03/24 1113   BP: 98/64   Pulse: 87   Weight: 119 lb (54 kg)   Height: 5' 3\" (1.6 m)       BMI: Body mass index is 21.08 kg/m².     CONSTITUTIONAL:  awake, alert, cooperative, no apparent distress, and appears stated age  PSYCH: normal affect  SKIN:  no bruising or bleeding, no rashes and no lesions  EXTREMITIES: no edema      DATA:   Reviewed    ASSESSMENT AND PLAN: This is a 38 year-old woman here for evaluation and management of satinder-menopausal symptoms, and elevated fasting glucose. I would like to perform a complete hormonal evaluation as well as other parameters of hair loss and chronic fatigue. I will follow up with patient once test results are back.     Prior to this encounter, I spent over 15 minutes with preparing for the visit, including reviewing  documents from other specialties as well as from PCP and going over test results and imaging studies. During the face to face encounter, I spent an additional 15 minutes which were determined for follow-up. Greater than 50% of the time was spent in counseling, anticipatory guidance, and coordination of care. Patient concerns were answered to the best of my knowledge.           10/03/24  Rebeka Hernandez MD

## 2024-10-04 ENCOUNTER — LAB ENCOUNTER (OUTPATIENT)
Dept: LAB | Age: 39
End: 2024-10-04
Attending: INTERNAL MEDICINE
Payer: COMMERCIAL

## 2024-10-04 DIAGNOSIS — R73.09 DYSGLYCEMIA: ICD-10-CM

## 2024-10-04 DIAGNOSIS — L65.9 HAIR LOSS: ICD-10-CM

## 2024-10-04 DIAGNOSIS — R53.82 CHRONIC FATIGUE: ICD-10-CM

## 2024-10-04 DIAGNOSIS — N92.6 IRREGULAR PERIODS/MENSTRUAL CYCLES: ICD-10-CM

## 2024-10-04 LAB
ALBUMIN SERPL-MCNC: 4.7 G/DL (ref 3.2–4.8)
ALBUMIN/GLOB SERPL: 1.6 {RATIO} (ref 1–2)
ALP LIVER SERPL-CCNC: 41 U/L
ALT SERPL-CCNC: 14 U/L
ANION GAP SERPL CALC-SCNC: 5 MMOL/L (ref 0–18)
AST SERPL-CCNC: 19 U/L (ref ?–34)
BASOPHILS # BLD AUTO: 0.04 X10(3) UL (ref 0–0.2)
BASOPHILS NFR BLD AUTO: 0.8 %
BILIRUB SERPL-MCNC: 0.6 MG/DL (ref 0.3–1.2)
BUN BLD-MCNC: 11 MG/DL (ref 9–23)
BUN/CREAT SERPL: 13.6 (ref 10–20)
CALCIUM BLD-MCNC: 9.6 MG/DL (ref 8.7–10.4)
CHLORIDE SERPL-SCNC: 106 MMOL/L (ref 98–112)
CHOLEST SERPL-MCNC: 237 MG/DL (ref ?–200)
CO2 SERPL-SCNC: 27 MMOL/L (ref 21–32)
CORTIS SERPL-MCNC: 15.2 UG/DL
CREAT BLD-MCNC: 0.81 MG/DL
DEPRECATED HBV CORE AB SER IA-ACNC: 31 NG/ML
DEPRECATED RDW RBC AUTO: 41.7 FL (ref 35.1–46.3)
DHEA-S SERPL-MCNC: 112.8 UG/DL
EGFRCR SERPLBLD CKD-EPI 2021: 95 ML/MIN/1.73M2 (ref 60–?)
EOSINOPHIL # BLD AUTO: 0.06 X10(3) UL (ref 0–0.7)
EOSINOPHIL NFR BLD AUTO: 1.1 %
ERYTHROCYTE [DISTWIDTH] IN BLOOD BY AUTOMATED COUNT: 12.7 % (ref 11–15)
EST. AVERAGE GLUCOSE BLD GHB EST-MCNC: 100 MG/DL (ref 68–126)
ESTRADIOL SERPL-MCNC: 30.2 PG/ML
FASTING PATIENT LIPID ANSWER: YES
FASTING STATUS PATIENT QL REPORTED: YES
FSH SERPL-ACNC: 6.7 MIU/ML
GLOBULIN PLAS-MCNC: 3 G/DL (ref 2–3.5)
GLUCOSE BLD-MCNC: 83 MG/DL (ref 70–99)
HBA1C MFR BLD: 5.1 % (ref ?–5.7)
HCT VFR BLD AUTO: 37.6 %
HDLC SERPL-MCNC: 69 MG/DL (ref 40–59)
HGB BLD-MCNC: 12.9 G/DL
IMM GRANULOCYTES # BLD AUTO: 0 X10(3) UL (ref 0–1)
IMM GRANULOCYTES NFR BLD: 0 %
INSULIN SERPL-ACNC: 2.2 MU/L (ref 3–25)
IRON SATN MFR SERPL: 19 %
IRON SERPL-MCNC: 71 UG/DL
LDLC SERPL CALC-MCNC: 157 MG/DL (ref ?–100)
LH SERPL-ACNC: 5.6 MIU/ML
LYMPHOCYTES # BLD AUTO: 2.83 X10(3) UL (ref 1–4)
LYMPHOCYTES NFR BLD AUTO: 54 %
MCH RBC QN AUTO: 30.7 PG (ref 26–34)
MCHC RBC AUTO-ENTMCNC: 34.3 G/DL (ref 31–37)
MCV RBC AUTO: 89.5 FL
MONOCYTES # BLD AUTO: 0.27 X10(3) UL (ref 0.1–1)
MONOCYTES NFR BLD AUTO: 5.2 %
NEUTROPHILS # BLD AUTO: 2.04 X10 (3) UL (ref 1.5–7.7)
NEUTROPHILS # BLD AUTO: 2.04 X10(3) UL (ref 1.5–7.7)
NEUTROPHILS NFR BLD AUTO: 38.9 %
NONHDLC SERPL-MCNC: 168 MG/DL (ref ?–130)
OSMOLALITY SERPL CALC.SUM OF ELEC: 285 MOSM/KG (ref 275–295)
PLATELET # BLD AUTO: 253 10(3)UL (ref 150–450)
POTASSIUM SERPL-SCNC: 3.8 MMOL/L (ref 3.5–5.1)
PROGEST SERPL-MCNC: 0.81 NG/ML
PROLACTIN SERPL-MCNC: 11.5 NG/ML
PROT SERPL-MCNC: 7.7 G/DL (ref 5.7–8.2)
RBC # BLD AUTO: 4.2 X10(6)UL
SODIUM SERPL-SCNC: 138 MMOL/L (ref 136–145)
T3FREE SERPL-MCNC: 2.55 PG/ML (ref 2.4–4.2)
T4 FREE SERPL-MCNC: 1.4 NG/DL (ref 0.8–1.7)
TIBC SERPL-MCNC: 371 UG/DL (ref 250–425)
TRANSFERRIN SERPL-MCNC: 249 MG/DL (ref 250–380)
TRIGL SERPL-MCNC: 63 MG/DL (ref 30–149)
TSI SER-ACNC: 1.45 MIU/ML (ref 0.55–4.78)
VIT B12 SERPL-MCNC: 751 PG/ML (ref 211–911)
VIT D+METAB SERPL-MCNC: 30.2 NG/ML (ref 30–100)
VLDLC SERPL CALC-MCNC: 12 MG/DL (ref 0–30)
WBC # BLD AUTO: 5.2 X10(3) UL (ref 4–11)

## 2024-10-04 PROCEDURE — 84481 FREE ASSAY (FT-3): CPT | Performed by: INTERNAL MEDICINE

## 2024-10-04 PROCEDURE — 82627 DEHYDROEPIANDROSTERONE: CPT | Performed by: INTERNAL MEDICINE

## 2024-10-04 PROCEDURE — 82306 VITAMIN D 25 HYDROXY: CPT

## 2024-10-04 PROCEDURE — 84146 ASSAY OF PROLACTIN: CPT

## 2024-10-04 PROCEDURE — 84443 ASSAY THYROID STIM HORMONE: CPT | Performed by: INTERNAL MEDICINE

## 2024-10-04 PROCEDURE — 84466 ASSAY OF TRANSFERRIN: CPT | Performed by: INTERNAL MEDICINE

## 2024-10-04 PROCEDURE — 80053 COMPREHEN METABOLIC PANEL: CPT

## 2024-10-04 PROCEDURE — 82607 VITAMIN B-12: CPT | Performed by: INTERNAL MEDICINE

## 2024-10-04 PROCEDURE — 82533 TOTAL CORTISOL: CPT

## 2024-10-04 PROCEDURE — 83001 ASSAY OF GONADOTROPIN (FSH): CPT

## 2024-10-04 PROCEDURE — 83540 ASSAY OF IRON: CPT | Performed by: INTERNAL MEDICINE

## 2024-10-04 PROCEDURE — 84410 TESTOSTERONE BIOAVAILABLE: CPT | Performed by: INTERNAL MEDICINE

## 2024-10-04 PROCEDURE — 82024 ASSAY OF ACTH: CPT

## 2024-10-04 PROCEDURE — 80061 LIPID PANEL: CPT

## 2024-10-04 PROCEDURE — 84439 ASSAY OF FREE THYROXINE: CPT | Performed by: INTERNAL MEDICINE

## 2024-10-04 PROCEDURE — 83036 HEMOGLOBIN GLYCOSYLATED A1C: CPT

## 2024-10-04 PROCEDURE — 82728 ASSAY OF FERRITIN: CPT | Performed by: INTERNAL MEDICINE

## 2024-10-04 PROCEDURE — 84144 ASSAY OF PROGESTERONE: CPT | Performed by: INTERNAL MEDICINE

## 2024-10-04 PROCEDURE — 83525 ASSAY OF INSULIN: CPT | Performed by: INTERNAL MEDICINE

## 2024-10-04 PROCEDURE — 83002 ASSAY OF GONADOTROPIN (LH): CPT

## 2024-10-04 PROCEDURE — 85025 COMPLETE CBC W/AUTO DIFF WBC: CPT

## 2024-10-04 PROCEDURE — 36415 COLL VENOUS BLD VENIPUNCTURE: CPT

## 2024-10-04 PROCEDURE — 83520 IMMUNOASSAY QUANT NOS NONAB: CPT | Performed by: INTERNAL MEDICINE

## 2024-10-04 PROCEDURE — 82670 ASSAY OF TOTAL ESTRADIOL: CPT

## 2024-10-05 LAB — ACTH: 29.6 PG/ML

## 2024-10-07 LAB — MULLERIAN AMH: 3.19 NG/ML

## 2024-10-11 LAB
SEX HORM BIND GLOB: 115 NMOL/L
TESTOST % FREE+WEAK BND: 8.2 %
TESTOST FREE+WEAK BND: 1.9 NG/DL
TESTOSTERONE TOT /MS: 22.7 NG/DL

## 2024-10-26 PROBLEM — R73.09 DYSGLYCEMIA: Status: ACTIVE | Noted: 2024-10-26

## 2024-11-08 PROBLEM — N90.89 LESION OF VULVA: Status: ACTIVE | Noted: 2024-11-08

## 2024-11-12 ENCOUNTER — OFFICE VISIT (OUTPATIENT)
Dept: OBGYN CLINIC | Facility: CLINIC | Age: 39
End: 2024-11-12

## 2024-11-12 VITALS
HEIGHT: 63 IN | DIASTOLIC BLOOD PRESSURE: 71 MMHG | HEART RATE: 105 BPM | SYSTOLIC BLOOD PRESSURE: 105 MMHG | WEIGHT: 120 LBS | BODY MASS INDEX: 21.26 KG/M2

## 2024-11-12 DIAGNOSIS — N90.89 LESION OF VULVA: ICD-10-CM

## 2024-11-12 DIAGNOSIS — N76.0 VAGINITIS AND VULVOVAGINITIS: Primary | ICD-10-CM

## 2024-11-12 PROCEDURE — 88305 TISSUE EXAM BY PATHOLOGIST: CPT | Performed by: STUDENT IN AN ORGANIZED HEALTH CARE EDUCATION/TRAINING PROGRAM

## 2024-11-12 RX ORDER — TRIAMCINOLONE ACETONIDE 1 MG/G
1 OINTMENT TOPICAL NIGHTLY
Qty: 15 G | Refills: 0 | Status: SHIPPED | OUTPATIENT
Start: 2024-11-12

## 2024-11-12 RX ORDER — DIPHENHYDRAMINE HCL 50 MG
50 CAPSULE ORAL NIGHTLY PRN
COMMUNITY

## 2024-11-12 RX ORDER — LORAZEPAM 1 MG/1
TABLET ORAL
COMMUNITY
Start: 2024-11-08

## 2024-11-12 NOTE — ASSESSMENT & PLAN NOTE
Punch biopsy collected today; anticipate likely inflammatory skin process more likely than TRICIA. Aftercare with non-adherent dressing (Telfa vs Saran wrap) + ointments with lidocaine or pramoxine for pain control. F/u results.

## 2024-11-12 NOTE — PROGRESS NOTES
[facility]  Obstetrics and Gynecology  Gynecology Established Problem Exam    Chief Complaint   Patient presents with    Follow - Up     Vulva biopsy              Monika Monae is a 38 year old female presenting for Follow - Up (Vulva biopsy /)       Here for planned vulvar biopsy. Had a flare up along usual weekly trend, skin was very thick and dry and plaque appearing. Appearance significantly changed with EMLA cream application; much less dry and plaque-like. Had some bleeding from that spot earlier today on her underwear and on the bandaid she used to the keep the EMLA on.     Nervous, took her lorazepam at 1230. Reports currently vaping, previously tobacco use.       Medications (Active prior to today's visit):  Current Outpatient Medications   Medication Sig Dispense Refill    LORazepam 1 MG Oral Tab       triamcinolone 0.1 % External Ointment Apply 1 Application topically nightly. 15 g 0    diphenhydrAMINE 50 MG Oral Cap Take 1 capsule (50 mg total) by mouth nightly as needed.      lidocaine-prilocaine 2.5-2.5 % External Cream Apply 1 Application topically as needed (Apply to area of thickened vulvar skin approx 1-2 hours prior to biopsy procedure, and after if pain or itching from healing). 30 g 0     Allergies:  Allergies[1]  HISTORY:     OB History    Para Term  AB Living   2 2 1 1 0 2   SAB IAB Ectopic Multiple Live Births           2      # Outcome Date GA Lbr Skip/2nd Weight Sex Type Anes PTL Lv   2  21 33w4d  7 lb 6 oz (3.345 kg) F Caesarean Spinal N MARTIN      Complications: Group B beta strep +, Preeclampsia   1 Term 08 40w0d  5 lb 6 oz (2.438 kg) F CS-Unspec Spinal  MARTIN      Birth Comments:  for severe oligohydramnios and pre-eclampsia-no labor. pt voices she was pre-diabetic with pregnancy.      Complications: Meconium, Oligohydramnios, Oligohydramnios (HCC)                 Past Medical History:    ADHD    Anxiety    Chronic post-traumatic stress disorder (PTSD)     Decorative tattoo    Depression    Gestational diabetes mellitus (GDM) in third trimester (HCC)    History of anorexia nervosa       Past Surgical History:   Procedure Laterality Date    Breast prosthesis, silicone or equal, with integral adhesive Bilateral     saline implants                    Family History   Problem Relation Age of Onset    Other (Other) Father          of MVA    Stroke Father     Personality Disorder Father     Hypertension Mother     Diabetes Mother     Personality Disorder Mother     Diabetes Maternal Grandmother     Stroke Maternal Grandfather     Other (systemic lupus) Sister 40    Other (Other) Cousin 35    Breast Cancer Neg     Ovarian Cancer Neg     Uterine Cancer Neg     Colon Cancer Neg        Social History     Socioeconomic History    Marital status:      Spouse name: Not on file    Number of children: Not on file    Years of education: Not on file    Highest education level: Not on file   Occupational History    Not on file   Tobacco Use    Smoking status: Former     Current packs/day: 0.25     Average packs/day: 0.3 packs/day for 16.0 years (4.0 ttl pk-yrs)     Types: Cigarettes    Smokeless tobacco: Never   Vaping Use    Vaping status: Former    Substances: Nicotine   Substance and Sexual Activity    Alcohol use: Yes     Comment: per pt socially     Drug use: Not Currently     Types: Cannabis     Comment: \"doesn't work for her\"    Sexual activity: Not on file   Other Topics Concern    Not on file   Social History Narrative    Not on file     Social Drivers of Health     Financial Resource Strain: Not on file   Food Insecurity: Not on file   Transportation Needs: Not on file   Physical Activity: Not on file   Stress: Not on file   Social Connections: Not on file   Housing Stability: Not on file       ROS:   Review of Systems:    General: no fevers, chills, unintended weight loss/gain except as above  Cardiovascular: no chest pain, new or  unexplained SOB except as above  Gastrointestinal: no nausea/vomiting, diarrhea, or blood in stool except as above  Respiratory: no new symptoms reported except as above  Skin: no new symptoms reported except as above  Psychiatric: no new symptoms reported except as above  PHYSICAL EXAM:   /71 (BP Location: Right arm, Patient Position: Sitting, Cuff Size: adult)   Pulse 105   Ht 5' 3\" (1.6 m)   Wt 120 lb (54.4 kg)   LMP 2024 (Exact Date)   BMI 21.26 kg/m²     Physical Exam  HENT:      Head: Normocephalic and atraumatic.   Eyes:      Extraocular Movements: Extraocular movements intact.   Pulmonary:      Effort: Pulmonary effort is normal.   Abdominal:      Palpations: Abdomen is soft.   Genitourinary:     Comments: Area less distinct than previous 2/2 EMLA cream application but approximately 1 inch area of thickened skin with slightly different texture just above and to the left of the clitoral russ.   Musculoskeletal:      Cervical back: Normal range of motion.   Skin:     General: Skin is warm and dry.   Neurological:      General: No focal deficit present.      Mental Status: She is alert. Mental status is at baseline.   Psychiatric:         Behavior: Behavior normal.         Thought Content: Thought content normal.              Procedure:     Name and  confirmed. Vulvar adjacent to clitoral russ cleaned with betadine swabs x3. Infiltrated with 1 mL of 1% lidocaine with epinephrine. Tested to confirm analgesia. 4 mm punch biopsy used to collect sample from edge of suspected pathology. Biopsy site hemostatic after silver nitrate x1. Edges reapproximated with 1 interrupted suture for healing.       RESULTS & IMAGING         No results for input(s): \"URINEPREG\" in the last 72 hours.    No results for input(s): \"PGLU\", \"POCTGLUCOSE\" in the last 72 hours.    No results for input(s): \"GLUCOSEDIP\", \"BILIRUBIN\", \"KETONESDIP\", \"BLOODU\", \"PHURINE\", \"UROBILIN\", \"NITRITE\", \"LEUKOCYTES\", \"APPEARANCE\",  \"URINECOLOR\" in the last 72 hours.    Invalid input(s): \"SPECGRAV\"       ASSESSMENT & PLAN     Vaginitis and vulvovaginitis (Primary)  -     BIOPSY VULVA/PERINEUM,ONE LESN  -     Specimen to Pathology Tissue; Future; Expected date: 11/12/2024  Lesion of vulva  Assessment & Plan:  Punch biopsy collected today; anticipate likely inflammatory skin process more likely than TRICIA. Aftercare with non-adherent dressing (Telfa vs Saran wrap) + ointments with lidocaine or pramoxine for pain control. F/u results.   Other orders  -     Triamcinolone Acetonide; Apply 1 Application topically nightly.  Dispense: 15 g; Refill: 0    Desires discussion of hysterectomy next.       Berta Velez MD  11/12/2024  1:42 PM               [1] No Known Allergies

## 2025-02-13 ENCOUNTER — APPOINTMENT (OUTPATIENT)
Dept: URBAN - METROPOLITAN AREA CLINIC 244 | Age: 40
Setting detail: DERMATOLOGY
End: 2025-02-14

## 2025-02-13 DIAGNOSIS — L71.0 PERIORAL DERMATITIS: ICD-10-CM

## 2025-02-13 DIAGNOSIS — L21.8 OTHER SEBORRHEIC DERMATITIS: ICD-10-CM

## 2025-02-13 PROCEDURE — OTHER MEDICATION COUNSELING: OTHER

## 2025-02-13 PROCEDURE — OTHER DIAGNOSIS COMMENT: OTHER

## 2025-02-13 PROCEDURE — OTHER PRESCRIPTION MEDICATION MANAGEMENT: OTHER

## 2025-02-13 PROCEDURE — OTHER PRESCRIPTION: OTHER

## 2025-02-13 PROCEDURE — OTHER COUNSELING: OTHER

## 2025-02-13 PROCEDURE — 99214 OFFICE O/P EST MOD 30 MIN: CPT

## 2025-02-13 RX ORDER — TACROLIMUS 1 MG/G
OINTMENT TOPICAL
Qty: 30 | Refills: 1 | Status: ERX | COMMUNITY
Start: 2025-02-13

## 2025-02-13 RX ORDER — KETOCONAZOLE 20 MG/ML
SHAMPOO, SUSPENSION TOPICAL
Qty: 120 | Refills: 10 | Status: ERX | COMMUNITY
Start: 2025-02-13

## 2025-02-13 ASSESSMENT — LOCATION SIMPLE DESCRIPTION DERM
LOCATION SIMPLE: LEFT LIP
LOCATION SIMPLE: RIGHT LIP
LOCATION SIMPLE: LEFT SCALP

## 2025-02-13 ASSESSMENT — LOCATION DETAILED DESCRIPTION DERM
LOCATION DETAILED: RIGHT UPPER CUTANEOUS LIP
LOCATION DETAILED: LEFT MEDIAL FRONTAL SCALP
LOCATION DETAILED: LEFT LOWER CUTANEOUS LIP

## 2025-02-13 ASSESSMENT — LOCATION ZONE DERM
LOCATION ZONE: SCALP
LOCATION ZONE: LIP

## 2025-02-13 NOTE — PROCEDURE: MEDICATION COUNSELING
Tetracycline Pregnancy And Lactation Text: This medication is Pregnancy Category D and not consider safe during pregnancy. It is also excreted in breast milk.
Prednisone Pregnancy And Lactation Text: This medication is Pregnancy Category C and it isn't know if it is safe during pregnancy. This medication is excreted in breast milk.
Spevigo Pregnancy And Lactation Text: The risk during pregnancy and breastfeeding is uncertain with this medication. This medication does cross the placenta. It is unknown if this medication is found in breast milk.
Hyrimoz Counseling:  I discussed with the patient the risks of adalimumab including but not limited to myelosuppression, immunosuppression, autoimmune hepatitis, demyelinating diseases, lymphoma, and serious infections.  The patient understands that monitoring is required including a PPD at baseline and must alert us or the primary physician if symptoms of infection or other concerning signs are noted.
Aklief Pregnancy And Lactation Text: It is unknown if this medication is safe to use during pregnancy.  It is unknown if this medication is excreted in breast milk.  Breastfeeding women should use the topical cream on the smallest area of the skin for the shortest time needed while breastfeeding.  Do not apply to nipple and areola.
Isotretinoin Pregnancy And Lactation Text: This medication is Pregnancy Category X and is considered extremely dangerous during pregnancy. It is unknown if it is excreted in breast milk.
Nemluvio Pregnancy And Lactation Text: It is not known if Nemluvio causes fetal harm or is present in breast milk. Please proceed with caution if patients who are pregnant or breastfeeding.
Carac Counseling:  I discussed with the patient the risks of Carac including but not limited to erythema, scaling, itching, weeping, crusting, and pain.
Topical Sulfur Applications Pregnancy And Lactation Text: This medication is considered safe during pregnancy and breast feeding secondary to limited systemic absorption.
Picato Counseling:  I discussed with the patient the risks of Picato including but not limited to erythema, scaling, itching, weeping, crusting, and pain.
Semaglutide Counseling: I reviewed the possible side effects including: thyroid tumors, kidney disease, gallbladder disease, abdominal pain, constipation, diarrhea, nausea, vomiting and pancreatitis. Do not take this medication if you have a history or family history of multiple endocrine neoplasia syndrome type 2. Side effects reviewed, pt to contact office should one occur.
Doxepin Counseling:  Patient advised that the medication is sedating and not to drive a car after taking this medication. Patient informed of potential adverse effects including but not limited to dry mouth, urinary retention, and blurry vision.  The patient verbalized understanding of the proper use and possible adverse effects of doxepin.  All of the patient's questions and concerns were addressed.
Spironolactone Pregnancy And Lactation Text: This medication can cause feminization of the male fetus and should be avoided during pregnancy. The active metabolite is also found in breast milk.
Solaraze Counseling:  I discussed with the patient the risks of Solaraze including but not limited to erythema, scaling, itching, weeping, crusting, and pain.
Adbry Counseling: I discussed with the patient the risks of tralokinumab including but not limited to eye infection and irritation, cold sores, injection site reactions, worsening of asthma, allergic reactions and increased risk of parasitic infection.  Live vaccines should be avoided while taking tralokinumab. The patient understands that monitoring is required and they must alert us or the primary physician if symptoms of infection or other concerning signs are noted.
Rhofade Counseling: Rhofade is a topical medication which can decrease superficial blood flow where applied. Side effects are uncommon and include stinging, redness and allergic reactions.
Cimetidine Counseling:  I discussed with the patient the risks of Cimetidine including but not limited to gynecomastia, headache, diarrhea, nausea, drowsiness, arrhythmias, pancreatitis, skin rashes, psychosis, bone marrow suppression and kidney toxicity.
Cosentyx Pregnancy And Lactation Text: This medication is Pregnancy Category B and is considered safe during pregnancy. It is unknown if this medication is excreted in breast milk.
Terbinafine Pregnancy And Lactation Text: This medication is Pregnancy Category B and is considered safe during pregnancy. It is also excreted in breast milk and breast feeding isn't recommended.
Low Dose Naltrexone Pregnancy And Lactation Text: Naltrexone is pregnancy category C.  There have been no adequate and well-controlled studies in pregnant women.  It should be used in pregnancy only if the potential benefit justifies the potential risk to the fetus.   Limited data indicates that naltrexone is minimally excreted into breastmilk.
Topical Ketoconazole Counseling: Patient counseled that this medication may cause skin irritation or allergic reactions.  In the event of skin irritation, the patient was advised to reduce the amount of the drug applied or use it less frequently.   The patient verbalized understanding of the proper use and possible adverse effects of ketoconazole.  All of the patient's questions and concerns were addressed.
Litfulo Counseling: I discussed with the patient the risks of Litfulo therapy including but not limited to upper respiratory tract infections, shingles, cold sores, and nausea. Live vaccines should be avoided.  This medication has been linked to serious infections; higher rate of mortality; malignancy and lymphoproliferative disorders; major adverse cardiovascular events; thrombosis; gastrointestinal perforations; neutropenia; lymphopenia; anemia; liver enzyme elevations; and lipid elevations.
Xelsulaimanz Pregnancy And Lactation Text: This medication is Pregnancy Category D and is not considered safe during pregnancy.  The risk during breast feeding is also uncertain.
Cephalexin Counseling: I counseled the patient regarding use of cephalexin as an antibiotic for prophylactic and/or therapeutic purposes. Cephalexin (commonly prescribed under brand name Keflex) is a cephalosporin antibiotic which is active against numerous classes of bacteria, including most skin bacteria. Side effects may include nausea, diarrhea, gastrointestinal upset, rash, hives, yeast infections, and in rare cases, hepatitis, kidney disease, seizures, fever, confusion, neurologic symptoms, and others. Patients with severe allergies to penicillin medications are cautioned that there is about a 10% incidence of cross-reactivity with cephalosporins. When possible, patients with penicillin allergies should use alternatives to cephalosporins for antibiotic therapy.
Spevigo Counseling: I discussed with the patient the risks of Spevigo including but not limited to fatigue, nasuea, vomiting, headache, pruritus, urinary tract infection, an infusion related reactions.  The patient understands that monitoring is required including screening for tuberculosis at baseline and yearly screening thereafter while continuing Spevigo therapy. They should contact us if symptoms of infection or other concerning signs are noted.
Wegovy Pregnancy And Lactation Text: The fetal risk of this medication is unknown and taking while pregnant is not recommended. It is unknown if this medication is present in breast milk.
Minoxidil Pregnancy And Lactation Text: This medication has not been assigned a Pregnancy Risk Category but animal studies failed to show danger with the topical medication. It is unknown if the medication is excreted in breast milk.
Birth Control Pills Counseling: Birth Control Pill Counseling: I discussed with the patient the potential side effects of OCPs including but not limited to increased risk of stroke, heart attack, thrombophlebitis, deep venous thrombosis, hepatic adenomas, breast changes, GI upset, headaches, and depression.  The patient verbalized understanding of the proper use and possible adverse effects of OCPs. All of the patient's questions and concerns were addressed.
Ebglyss Counseling: I discussed with the patient the risks of lebrikizumab including but not limited to eye inflammation and irritation, cold sores, injection site reactions, allergic reactions and increased risk of parasitic infection. The patient understands that monitoring is required and they must alert us or the primary physician if symptoms of infection or other concerning signs are noted.
Dutasteride Male Counseling: Dustasteride Counseling:  I discussed with the patient the risks of use of dutasteride including but not limited to decreased libido, decreased ejaculate volume, and gynecomastia. Women who can become pregnant should not handle medication.  All of the patient's questions and concerns were addressed.
Gabapentin Pregnancy And Lactation Text: This medication is Pregnancy Category C and isn't considered safe during pregnancy. It is excreted in breast milk.
Siliq Counseling:  I discussed with the patient the risks of Siliq including but not limited to new or worsening depression, suicidal thoughts and behavior, immunosuppression, malignancy, posterior leukoencephalopathy syndrome, and serious infections.  The patient understands that monitoring is required including a PPD at baseline and must alert us or the primary physician if symptoms of infection or other concerning signs are noted. There is also a special program designed to monitor depression which is required with Siliq.
Topical Retinoid counseling:  Patient advised to apply a pea-sized amount only at bedtime and wait 30 minutes after washing their face before applying.  If too drying, patient may add a non-comedogenic moisturizer. The patient verbalized understanding of the proper use and possible adverse effects of retinoids.  All of the patient's questions and concerns were addressed.
5-Fu Pregnancy And Lactation Text: This medication is Pregnancy Category X and contraindicated in pregnancy and in women who may become pregnant. It is unknown if this medication is excreted in breast milk.
Soolantra Counseling: I discussed with the patients the risks of topial Soolantra. This is a medicine which decreases the number of mites and inflammation in the skin. You experience burning, stinging, eye irritation or allergic reactions.  Please call our office if you develop any problems from using this medication.
Quinolones Counseling:  I discussed with the patient the risks of fluoroquinolones including but not limited to GI upset, allergic reaction, drug rash, diarrhea, dizziness, photosensitivity, yeast infections, liver function test abnormalities, tendonitis/tendon rupture.
Zyclara Counseling:  I discussed with the patient the risks of imiquimod including but not limited to erythema, scaling, itching, weeping, crusting, and pain.  Patient understands that the inflammatory response to imiquimod is variable from person to person and was educated regarded proper titration schedule.  If flu-like symptoms develop, patient knows to discontinue the medication and contact us.
Imiquimod Counseling:  I discussed with the patient the risks of imiquimod including but not limited to erythema, scaling, itching, weeping, crusting, and pain.  Patient understands that the inflammatory response to imiquimod is variable from person to person and was educated regarded proper titration schedule.  If flu-like symptoms develop, patient knows to discontinue the medication and contact us.
Calcipotriene Counseling:  I discussed with the patient the risks of calcipotriene including but not limited to erythema, scaling, itching, and irritation.
Niacinamide Counseling: I recommended taking niacin or niacinamide, also know as vitamin B3, twice daily. Recent evidence suggests that taking vitamin B3 (500 mg twice daily) can reduce the risk of actinic keratoses and non-melanoma skin cancers. Side effects of vitamin B3 include flushing and headache.
Nsaids Counseling: NSAID Counseling: I discussed with the patient that NSAIDs should be taken with food. Prolonged use of NSAIDs can result in the development of stomach ulcers.  Patient advised to stop taking NSAIDs if abdominal pain occurs.  The patient verbalized understanding of the proper use and possible adverse effects of NSAIDs.  All of the patient's questions and concerns were addressed.
Prednisone Counseling:  I discussed with the patient the risks of prolonged use of prednisone including but not limited to weight gain, insomnia, osteoporosis, mood changes, diabetes, susceptibility to infection, glaucoma and high blood pressure.  In cases where prednisone use is prolonged, patients should be monitored with blood pressure checks, serum glucose levels and an eye exam.  Additionally, the patient may need to be placed on GI prophylaxis, PCP prophylaxis, and calcium and vitamin D supplementation and/or a bisphosphonate.  The patient verbalized understanding of the proper use and the possible adverse effects of prednisone.  All of the patient's questions and concerns were addressed.
Litfulo Pregnancy And Lactation Text: Based on animal studies, Lifulo may cause embryo-fetal harm when administered to pregnant women.  The medication should not be used in pregnancy.  Breastfeeding is not recommended during treatment.
Cantharidin Pregnancy And Lactation Text: This medication has not been proven safe during pregnancy. It is unknown if this medication is excreted in breast milk.
Olumiant Pregnancy And Lactation Text: Based on animal studies, Olumiant may cause embryo-fetal harm when administered to pregnant women.  The medication should not be used in pregnancy.  Breastfeeding is not recommended during treatment.
Drysol Pregnancy And Lactation Text: This medication is considered safe during pregnancy and breast feeding.
5-Fu Counseling: 5-Fluorouracil Counseling:  I discussed with the patient the risks of 5-fluorouracil including but not limited to erythema, scaling, itching, weeping, crusting, and pain.
SSKI Counseling:  I discussed with the patient the risks of SSKI including but not limited to thyroid abnormalities, metallic taste, GI upset, fever, headache, acne, arthralgias, paraesthesias, lymphadenopathy, easy bleeding, arrhythmias, and allergic reaction.
Oral Minoxidil Pregnancy And Lactation Text: This medication should only be used when clearly needed if you are pregnant, attempting to become pregnant or breast feeding.
Olanzapine Counseling- I discussed with the patient the common side effects of olanzapine including but are not limited to: lack of energy, dry mouth, increased appetite, sleepiness, tremor, constipation, dizziness, changes in behavior, or restlessness.  Explained that teenagers are more likely to experience headaches, abdominal pain, pain in the arms or legs, tiredness, and sleepiness.  Serious side effects include but are not limited: increased risk of death in elderly patients who are confused, have memory loss, or dementia-related psychosis; hyperglycemia; increased cholesterol and triglycerides; and weight gain.
Tetracycline Counseling: Patient counseled regarding possible photosensitivity and increased risk for sunburn.  Patient instructed to avoid sunlight, if possible.  When exposed to sunlight, patients should wear protective clothing, sunglasses, and sunscreen.  The patient was instructed to call the office immediately if the following severe adverse effects occur:  hearing changes, easy bruising/bleeding, severe headache, or vision changes.  The patient verbalized understanding of the proper use and possible adverse effects of tetracycline.  All of the patient's questions and concerns were addressed. Patient understands to avoid pregnancy while on therapy due to potential birth defects.
Eucrisa Counseling: Patient may experience a mild burning sensation during topical application. Eucrisa is not approved in children less than 3 months of age.
Cyclophosphamide Counseling:  I discussed with the patient the risks of cyclophosphamide including but not limited to hair loss, hormonal abnormalities, decreased fertility, abdominal pain, diarrhea, nausea and vomiting, bone marrow suppression and infection. The patient understands that monitoring is required while taking this medication.
Finasteride Pregnancy And Lactation Text: This medication is absolutely contraindicated during pregnancy. It is unknown if it is excreted in breast milk.
Hydroxyzine Pregnancy And Lactation Text: This medication is not safe during pregnancy and should not be taken. It is also excreted in breast milk and breast feeding isn't recommended.
Olumiant Counseling: I discussed with the patient the risks of Olumiant therapy including but not limited to upper respiratory tract infections, shingles, cold sores, and nausea. Live vaccines should be avoided.  This medication has been linked to serious infections; higher rate of mortality; malignancy and lymphoproliferative disorders; major adverse cardiovascular events; thrombosis; gastrointestinal perforations; neutropenia; lymphopenia; anemia; liver enzyme elevations; and lipid elevations.
Rinvoq Pregnancy And Lactation Text: Based on animal studies, Rinvoq may cause embryo-fetal harm when administered to pregnant women.  The medication should not be used in pregnancy.  Breastfeeding is not recommended during treatment and for 6 days after the last dose.
Rhofade Pregnancy And Lactation Text: This medication has not been assigned a Pregnancy Risk Category. It is unknown if the medication is excreted in breast milk.
Rinvoq Counseling: I discussed with the patient the risks of Rinvoq therapy including but not limited to upper respiratory tract infections, shingles, cold sores, bronchitis, nausea, cough, fever, acne, and headache. Live vaccines should be avoided.  This medication has been linked to serious infections; higher rate of mortality; malignancy and lymphoproliferative disorders; major adverse cardiovascular events; thrombosis; thrombocytopenia, anemia, and neutropenia; lipid elevations; liver enzyme elevations; and gastrointestinal perforations.
Vtama Pregnancy And Lactation Text: It is unknown if this medication can cause problems during pregnancy and breastfeeding.
Simponi Counseling:  I discussed with the patient the risks of golimumab including but not limited to myelosuppression, immunosuppression, autoimmune hepatitis, demyelinating diseases, lymphoma, and serious infections.  The patient understands that monitoring is required including a PPD at baseline and must alert us or the primary physician if symptoms of infection or other concerning signs are noted.
Xolair Counseling:  Patient informed of potential adverse effects including but not limited to fever, muscle aches, rash and allergic reactions.  The patient verbalized understanding of the proper use and possible adverse effects of Xolair.  All of the patient's questions and concerns were addressed.
Simponi Pregnancy And Lactation Text: The risk during pregnancy and breastfeeding is uncertain with this medication.
Oxybutynin Pregnancy And Lactation Text: This medication is Pregnancy Category B and is considered safe during pregnancy. It is unknown if it is excreted in breast milk.
Libtayo Counseling- I discussed with the patient the risks of Libtayo including but not limited to nausea, vomiting, diarrhea, and bone or muscle pain.  The patient verbalized understanding of the proper use and possible adverse effects of Libtayo.  All of the patient's questions and concerns were addressed.
Fluconazole Counseling:  Patient counseled regarding adverse effects of fluconazole including but not limited to headache, diarrhea, nausea, upset stomach, liver function test abnormalities, taste disturbance, and stomach pain.  There is a rare possibility of liver failure that can occur when taking fluconazole.  The patient understands that monitoring of LFTs and kidney function test may be required, especially at baseline. The patient verbalized understanding of the proper use and possible adverse effects of fluconazole.  All of the patient's questions and concerns were addressed.
Clindamycin Pregnancy And Lactation Text: This medication can be used in pregnancy if certain situations. Clindamycin is also present in breast milk.
Bactrim Pregnancy And Lactation Text: This medication is Pregnancy Category D and is known to cause fetal risk.  It is also excreted in breast milk.
Hydroquinone Counseling:  Patient advised that medication may result in skin irritation, lightening (hypopigmentation), dryness, and burning.  In the event of skin irritation, the patient was advised to reduce the amount of the drug applied or use it less frequently.  Rarely, spots that are treated with hydroquinone can become darker (pseudoochronosis).  Should this occur, patient instructed to stop medication and call the office. The patient verbalized understanding of the proper use and possible adverse effects of hydroquinone.  All of the patient's questions and concerns were addressed.
Thalidomide Pregnancy And Lactation Text: This medication is Pregnancy Category X and is absolutely contraindicated during pregnancy. It is unknown if it is excreted in breast milk.
Klisyri Pregnancy And Lactation Text: It is unknown if this medication can harm a developing fetus or if it is excreted in breast milk.
Tazorac Counseling:  Patient advised that medication is irritating and drying.  Patient may need to apply sparingly and wash off after an hour before eventually leaving it on overnight.  The patient verbalized understanding of the proper use and possible adverse effects of tazorac.  All of the patient's questions and concerns were addressed.
Azithromycin Pregnancy And Lactation Text: This medication is considered safe during pregnancy and is also secreted in breast milk.
High Dose Vitamin A Pregnancy And Lactation Text: High dose vitamin A therapy is contraindicated during pregnancy and breast feeding.
Finasteride Female Counseling: Finasteride Counseling:  I discussed with the patient the risks of use of finasteride including but not limited to decreased libido and sexual dysfunction. Explained the teratogenic nature of the medication and stressed the importance of not getting pregnant during treatment. All of the patient's questions and concerns were addressed.
Cibinqo Counseling: I discussed with the patient the risks of Cibinqo therapy including but not limited to common cold, nausea, headache, cold sores, increased blood CPK levels, dizziness, UTIs, fatigue, acne, and vomitting. Live vaccines should be avoided.  This medication has been linked to serious infections; higher rate of mortality; malignancy and lymphoproliferative disorders; major adverse cardiovascular events; thrombosis; thrombocytopenia and lymphopenia; lipid elevations; and retinal detachment.
Olanzapine Pregnancy And Lactation Text: This medication is pregnancy category C.   There are no adequate and well controlled trials with olanzapine in pregnant females.  Olanzapine should be used during pregnancy only if the potential benefit justifies the potential risk to the fetus.   In a study in lactating healthy women, olanzapine was excreted in breast milk.  It is recommended that women taking olanzapine should not breast feed.
Bactrim Counseling:  I discussed with the patient the risks of sulfa antibiotics including but not limited to GI upset, allergic reaction, drug rash, diarrhea, dizziness, photosensitivity, and yeast infections.  Rarely, more serious reactions can occur including but not limited to aplastic anemia, agranulocytosis, methemoglobinemia, blood dyscrasias, liver or kidney failure, lung infiltrates or desquamative/blistering drug rashes.
Topical Clindamycin Counseling: Patient counseled that this medication may cause skin irritation or allergic reactions.  In the event of skin irritation, the patient was advised to reduce the amount of the drug applied or use it less frequently.   The patient verbalized understanding of the proper use and possible adverse effects of clindamycin.  All of the patient's questions and concerns were addressed.
Sarecycline Counseling: Patient advised regarding possible photosensitivity and discoloration of the teeth, skin, lips, tongue and gums.  Patient instructed to avoid sunlight, if possible.  When exposed to sunlight, patients should wear protective clothing, sunglasses, and sunscreen.  The patient was instructed to call the office immediately if the following severe adverse effects occur:  hearing changes, easy bruising/bleeding, severe headache, or vision changes.  The patient verbalized understanding of the proper use and possible adverse effects of sarecycline.  All of the patient's questions and concerns were addressed.
Terbinafine Counseling: Patient counseling regarding adverse effects of terbinafine including but not limited to headache, diarrhea, rash, upset stomach, liver function test abnormalities, itching, taste/smell disturbance, nausea, abdominal pain, and flatulence.  There is a rare possibility of liver failure that can occur when taking terbinafine.  The patient understands that a baseline LFT and kidney function test may be required. The patient verbalized understanding of the proper use and possible adverse effects of terbinafine.  All of the patient's questions and concerns were addressed.
Ozempic Counseling: I reviewed the possible side effects including: thyroid tumors, kidney disease, gallbladder disease, abdominal pain, constipation, diarrhea, nausea, vomiting and pancreatitis. Do not take this medication if you have a history or family history of multiple endocrine neoplasia syndrome type 2. Side effects reviewed, pt to contact office should one occur.
Clindamycin Counseling: I counseled the patient regarding use of clindamycin as an antibiotic for prophylactic and/or therapeutic purposes. Clindamycin is active against numerous classes of bacteria, including skin bacteria. Side effects may include nausea, diarrhea, gastrointestinal upset, rash, hives, yeast infections, and in rare cases, colitis.
Calcipotriene Pregnancy And Lactation Text: The use of this medication during pregnancy or lactation is not recommended as there is insufficient data.
Clofazimine Counseling:  I discussed with the patient the risks of clofazimine including but not limited to skin and eye pigmentation, liver damage, nausea/vomiting, gastrointestinal bleeding and allergy.
Albendazole Pregnancy And Lactation Text: This medication is Pregnancy Category C and it isn't known if it is safe during pregnancy. It is also excreted in breast milk.
Glycopyrrolate Pregnancy And Lactation Text: This medication is Pregnancy Category B and is considered safe during pregnancy. It is unknown if it is excreted breast milk.
Drysol Counseling:  I discussed with the patient the risks of drysol/aluminum chloride including but not limited to skin rash, itching, irritation, burning.
Otezla Pregnancy And Lactation Text: This medication is Pregnancy Category C and it isn't known if it is safe during pregnancy. It is unknown if it is excreted in breast milk.
Albendazole Counseling:  I discussed with the patient the risks of albendazole including but not limited to cytopenia, kidney damage, nausea/vomiting and severe allergy.  The patient understands that this medication is being used in an off-label manner.
Dapsone Counseling: I discussed with the patient the risks of dapsone including but not limited to hemolytic anemia, agranulocytosis, rashes, methemoglobinemia, kidney failure, peripheral neuropathy, headaches, GI upset, and liver toxicity.  Patients who start dapsone require monitoring including baseline LFTs and weekly CBCs for the first month, then every month thereafter.  The patient verbalized understanding of the proper use and possible adverse effects of dapsone.  All of the patient's questions and concerns were addressed.
Rifampin Counseling: I discussed with the patient the risks of rifampin including but not limited to liver damage, kidney damage, red-orange body fluids, nausea/vomiting and severe allergy.
Cimzia Counseling:  I discussed with the patient the risks of Cimzia including but not limited to immunosuppression, allergic reactions and infections.  The patient understands that monitoring is required including a PPD at baseline and must alert us or the primary physician if symptoms of infection or other concerning signs are noted.
Griseofulvin Counseling:  I discussed with the patient the risks of griseofulvin including but not limited to photosensitivity, cytopenia, liver damage, nausea/vomiting and severe allergy.  The patient understands that this medication is best absorbed when taken with a fatty meal (e.g., ice cream or french fries).
Tranexamic Acid Counseling:  Patient advised of the small risk of bleeding problems with tranexamic acid. They were also instructed to call if they developed any nausea, vomiting or diarrhea. All of the patient's questions and concerns were addressed.
Acitretin Pregnancy And Lactation Text: This medication is Pregnancy Category X and should not be given to women who are pregnant or may become pregnant in the future. This medication is excreted in breast milk.
Dutasteride Female Counseling: Dutasteride Counseling:  I discussed with the patient the risks of use of dutasteride including but not limited to decreased libido and sexual dysfunction. Explained the teratogenic nature of the medication and stressed the importance of not getting pregnant during treatment. All of the patient's questions and concerns were addressed.
Aklief counseling:  Patient advised to apply a pea-sized amount only at bedtime and wait 30 minutes after washing their face before applying.  If too drying, patient may add a non-comedogenic moisturizer.  The most commonly reported side effects including irritation, redness, scaling, dryness, stinging, burning, itching, and increased risk of sunburn.  The patient verbalized understanding of the proper use and possible adverse effects of retinoids.  All of the patient's questions and concerns were addressed.
Dapsone Pregnancy And Lactation Text: This medication is Pregnancy Category C and is not considered safe during pregnancy or breast feeding.
Xeljanz Counseling: I discussed with the patient the risks of Xeljanz therapy including increased risk of infection, liver issues, headache, diarrhea, or cold symptoms. Live vaccines should be avoided. They were instructed to call if they have any problems.
Zyclara Pregnancy And Lactation Text: This medication is Pregnancy Category C. It is unknown if this medication is excreted in breast milk.
Ketoconazole Counseling:   Patient counseled regarding improving absorption with orange juice.  Adverse effects include but are not limited to breast enlargement, headache, diarrhea, nausea, upset stomach, liver function test abnormalities, taste disturbance, and stomach pain.  There is a rare possibility of liver failure that can occur when taking ketoconazole. The patient understands that monitoring of LFTs may be required, especially at baseline. The patient verbalized understanding of the proper use and possible adverse effects of ketoconazole.  All of the patient's questions and concerns were addressed.
Topical Sulfur Applications Counseling: Topical Sulfur Counseling: Patient counseled that this medication may cause skin irritation or allergic reactions.  In the event of skin irritation, the patient was advised to reduce the amount of the drug applied or use it less frequently.   The patient verbalized understanding of the proper use and possible adverse effects of topical sulfur application.  All of the patient's questions and concerns were addressed.
Fluconazole Pregnancy And Lactation Text: This medication is Pregnancy Category C and it isn't know if it is safe during pregnancy. It is also excreted in breast milk.
Simlandi Counseling:  I discussed with the patient the risks of adalimumab including but not limited to myelosuppression, immunosuppression, autoimmune hepatitis, demyelinating diseases, lymphoma, and serious infections.  The patient understands that monitoring is required including a PPD at baseline and must alert us or the primary physician if symptoms of infection or other concerning signs are noted.
Qbrexza Counseling:  I discussed with the patient the risks of Qbrexza including but not limited to headache, mydriasis, blurred vision, dry eyes, nasal dryness, dry mouth, dry throat, dry skin, urinary hesitation, and constipation.  Local skin reactions including erythema, burning, stinging, and itching can also occur.
Hydroxychloroquine Counseling:  I discussed with the patient that a baseline ophthalmologic exam is needed at the start of therapy and every year thereafter while on therapy. A CBC may also be warranted for monitoring.  The side effects of this medication were discussed with the patient, including but not limited to agranulocytosis, aplastic anemia, seizures, rashes, retinopathy, and liver toxicity. Patient instructed to call the office should any adverse effect occur.  The patient verbalized understanding of the proper use and possible adverse effects of Plaquenil.  All the patient's questions and concerns were addressed.
Topical Metronidazole Counseling: Metronidazole is a topical antibiotic medication. You may experience burning, stinging, redness, or allergic reactions.  Please call our office if you develop any problems from using this medication.
Bimzelx Counseling:  I discussed with the patient the risks of Bimzelx including but not limited to depression, immunosuppression, allergic reactions and infections.  The patient understands that monitoring is required including a PPD at baseline and must alert us or the primary physician if symptoms of infection or other concerning signs are noted.
Enbrel Counseling:  I discussed with the patient the risks of etanercept including but not limited to myelosuppression, immunosuppression, autoimmune hepatitis, demyelinating diseases, lymphoma, and infections.  The patient understands that monitoring is required including a PPD at baseline and must alert us or the primary physician if symptoms of infection or other concerning signs are noted.
Winlevi Pregnancy And Lactation Text: This medication is considered safe during pregnancy and breastfeeding.
Dupixent Pregnancy And Lactation Text: This medication likely crosses the placenta but the risk for the fetus is uncertain. This medication is excreted in breast milk.
Minocycline Counseling: Patient advised regarding possible photosensitivity and discoloration of the teeth, skin, lips, tongue and gums.  Patient instructed to avoid sunlight, if possible.  When exposed to sunlight, patients should wear protective clothing, sunglasses, and sunscreen.  The patient was instructed to call the office immediately if the following severe adverse effects occur:  hearing changes, easy bruising/bleeding, severe headache, or vision changes.  The patient verbalized understanding of the proper use and possible adverse effects of minocycline.  All of the patient's questions and concerns were addressed.
Spironolactone Counseling: Patient advised regarding risks of diarrhea, abdominal pain, hyperkalemia, birth defects (for female patients), liver toxicity and renal toxicity. The patient may need blood work to monitor liver and kidney function and potassium levels while on therapy. The patient verbalized understanding of the proper use and possible adverse effects of spironolactone.  All of the patient's questions and concerns were addressed.
Wartpeel Counseling:  I discussed with the patient the risks of Wartpeel including but not limited to erythema, scaling, itching, weeping, crusting, and pain.
Ilumya Counseling: I discussed with the patient the risks of tildrakizumab including but not limited to immunosuppression, malignancy, posterior leukoencephalopathy syndrome, and serious infections.  The patient understands that monitoring is required including a PPD at baseline and must alert us or the primary physician if symptoms of infection or other concerning signs are noted.
Azithromycin Counseling:  I discussed with the patient the risks of azithromycin including but not limited to GI upset, allergic reaction, drug rash, diarrhea, and yeast infections.
Winlevi Counseling:  I discussed with the patient the risks of topical clascoterone including but not limited to erythema, scaling, itching, and stinging. Patient voiced their understanding.
Doxycycline Pregnancy And Lactation Text: This medication is Pregnancy Category D and not consider safe during pregnancy. It is also excreted in breast milk but is considered safe for shorter treatment courses.
Taltz Counseling: I discussed with the patient the risks of ixekizumab including but not limited to immunosuppression, serious infections, worsening of inflammatory bowel disease and drug reactions.  The patient understands that monitoring is required including a PPD at baseline and must alert us or the primary physician if symptoms of infection or other concerning signs are noted.
Libtayo Pregnancy And Lactation Text: This medication is contraindicated in pregnancy and when breast feeding.
Skyrizi Counseling: I discussed with the patient the risks of risankizumab-rzaa including but not limited to immunosuppression, and serious infections.  The patient understands that monitoring is required including a PPD at baseline and must alert us or the primary physician if symptoms of infection or other concerning signs are noted.
Mirvaso Counseling: Mirvaso is a topical medication which can decrease superficial blood flow where applied. Side effects are uncommon and include stinging, redness and allergic reactions.
Valtrex Pregnancy And Lactation Text: this medication is Pregnancy Category B and is considered safe during pregnancy. This medication is not directly found in breast milk but it's metabolite acyclovir is present.
Cantharidin Counseling:  I discussed with the patient the risks of Cantharidin including but not limited to pain, redness, burning, itching, and blistering.
Ketoconazole Pregnancy And Lactation Text: This medication is Pregnancy Category C and it isn't know if it is safe during pregnancy. It is also excreted in breast milk and breast feeding isn't recommended.
Hydroxyzine Counseling: Patient advised that the medication is sedating and not to drive a car after taking this medication.  Patient informed of potential adverse effects including but not limited to dry mouth, urinary retention, and blurry vision.  The patient verbalized understanding of the proper use and possible adverse effects of hydroxyzine.  All of the patient's questions and concerns were addressed.
Benzoyl Peroxide Counseling: Patient counseled that medicine may cause skin irritation and bleach clothing.  In the event of skin irritation, the patient was advised to reduce the amount of the drug applied or use it less frequently.   The patient verbalized understanding of the proper use and possible adverse effects of benzoyl peroxide.  All of the patient's questions and concerns were addressed.
Use Enhanced Medication Counseling?: Yes
Cephalexin Pregnancy And Lactation Text: This medication is Pregnancy Category B and considered safe during pregnancy.  It is also excreted in breast milk but can be used safely for shorter doses.
Topical Steroids Applications Pregnancy And Lactation Text: Most topical steroids are considered safe to use during pregnancy and lactation.  Any topical steroid applied to the breast or nipple should be washed off before breastfeeding.
Protopic Pregnancy And Lactation Text: This medication is Pregnancy Category C. It is unknown if this medication is excreted in breast milk when applied topically.
Zoryve Counseling:  I discussed with the patient that Zoryve is not for use in the eyes, mouth or vagina. The most commonly reported side effects include diarrhea, headache, insomnia, application site pain, upper respiratory tract infections, and urinary tract infections.  All of the patient's questions and concerns were addressed.
Nemluvio Counseling: I discussed with the patient the risks of nemolizumab including but not limited to headache, gastrointestinal complaints, nasopharyngitis, musculoskeletal complaints, injection site reactions, and allergic reactions. The patient understands that monitoring is required and they must alert us or the primary physician if any side effects are noted.
Minoxidil Counseling: Minoxidil is a topical medication which can increase blood flow where it is applied. It is uncertain how this medication increases hair growth. Side effects are uncommon and include stinging and allergic reactions.
Acitretin Counseling:  I discussed with the patient the risks of acitretin including but not limited to hair loss, dry lips/skin/eyes, liver damage, hyperlipidemia, depression/suicidal ideation, photosensitivity.  Serious rare side effects can include but are not limited to pancreatitis, pseudotumor cerebri, bony changes, clot formation/stroke/heart attack.  Patient understands that alcohol is contraindicated since it can result in liver toxicity and significantly prolong the elimination of the drug by many years.
Detail Level: Simple
Opzelura Counseling:  I discussed with the patient the risks of Opzelura including but not limited to nasopharngitis, bronchitis, ear infection, eosinophila, hives, diarrhea, folliculitis, tonsillitis, and rhinorrhea.  Taken orally, this medication has been linked to serious infections; higher rate of mortality; malignancy and lymphoproliferative disorders; major adverse cardiovascular events; thrombosis; thrombocytopenia, anemia, and neutropenia; and lipid elevations.
Tranexamic Acid Pregnancy And Lactation Text: It is unknown if this medication is safe during pregnancy or breast feeding.
Humira Counseling:  I discussed with the patient the risks of adalimumab including but not limited to myelosuppression, immunosuppression, autoimmune hepatitis, demyelinating diseases, lymphoma, and serious infections.  The patient understands that monitoring is required including a PPD at baseline and must alert us or the primary physician if symptoms of infection or other concerning signs are noted.
Rituxan Pregnancy And Lactation Text: This medication is Pregnancy Category C and it isn't know if it is safe during pregnancy. It is unknown if this medication is excreted in breast milk but similar antibodies are known to be excreted.
Xolair Pregnancy And Lactation Text: This medication is Pregnancy Category B and is considered safe during pregnancy. This medication is excreted in breast milk.
Hydroxychloroquine Pregnancy And Lactation Text: This medication has been shown to cause fetal harm but it isn't assigned a Pregnancy Risk Category. There are small amounts excreted in breast milk.
Saxenda Counseling: I reviewed the possible side effects including: thyroid tumors, kidney disease, gallbladder disease, abdominal pain, constipation, diarrhea, nausea, vomiting and pancreatitis. Do not take this medication if you have a history or family history of multiple endocrine neoplasia syndrome type 2. Side effects reviewed, pt to contact office should one occur.
Ebglyss Pregnancy And Lactation Text: This medication likely crosses the placenta but the risk for the fetus is uncertain. It is unknown if this medication is excreted in breast milk.
Rituxan Counseling:  I discussed with the patient the risks of Rituxan infusions. Side effects can include infusion reactions, severe drug rashes including mucocutaneous reactions, reactivation of latent hepatitis and other infections and rarely progressive multifocal leukoencephalopathy.  All of the patient's questions and concerns were addressed.
Sski Pregnancy And Lactation Text: This medication is Pregnancy Category D and isn't considered safe during pregnancy. It is excreted in breast milk.
Gabapentin Counseling: I discussed with the patient the risks of gabapentin including but not limited to dizziness, somnolence, fatigue and ataxia.
Metronidazole Pregnancy And Lactation Text: This medication is Pregnancy Category B and considered safe during pregnancy.  It is also excreted in breast milk.
Oral Minoxidil Counseling- I discussed with the patient the risks of oral minoxidil including but not limited to shortness of breath, swelling of the feet or ankles, dizziness, lightheadedness, unwanted hair growth and allergic reaction.  The patient verbalized understanding of the proper use and possible adverse effects of oral minoxidil.  All of the patient's questions and concerns were addressed.
Azathioprine Counseling:  I discussed with the patient the risks of azathioprine including but not limited to myelosuppression, immunosuppression, hepatotoxicity, lymphoma, and infections.  The patient understands that monitoring is required including baseline LFTs, Creatinine, possible TPMP genotyping and weekly CBCs for the first month and then every 2 weeks thereafter.  The patient verbalized understanding of the proper use and possible adverse effects of azathioprine.  All of the patient's questions and concerns were addressed.
Tazorac Pregnancy And Lactation Text: This medication is not safe during pregnancy. It is unknown if this medication is excreted in breast milk.
Metronidazole Counseling:  I discussed with the patient the risks of metronidazole including but not limited to seizures, nausea/vomiting, a metallic taste in the mouth, nausea/vomiting and severe allergy.
Klisyri Counseling:  I discussed with the patient the risks of Klisyri including but not limited to erythema, scaling, itching, weeping, crusting, and pain.
Birth Control Pills Pregnancy And Lactation Text: This medication should be avoided if pregnant and for the first 30 days post-partum.
Griseofulvin Pregnancy And Lactation Text: This medication is Pregnancy Category X and is known to cause serious birth defects. It is unknown if this medication is excreted in breast milk but breast feeding should be avoided.
Itraconazole Counseling:  I discussed with the patient the risks of itraconazole including but not limited to liver damage, nausea/vomiting, neuropathy, and severe allergy.  The patient understands that this medication is best absorbed when taken with acidic beverages such as non-diet cola or ginger ale.  The patient understands that monitoring is required including baseline LFTs and repeat LFTs at intervals.  The patient understands that they are to contact us or the primary physician if concerning signs are noted.
Benzoyl Peroxide Pregnancy And Lactation Text: This medication is Pregnancy Category C. It is unknown if benzoyl peroxide is excreted in breast milk.
Include Pregnancy/Lactation Warning?: No
Isotretinoin Counseling: Patient should get monthly blood tests, not donate blood, not drive at night if vision affected, not share medication, and not undergo elective surgery for 6 months after tx completed. Side effects reviewed, pt to contact office should one occur.
Dupixent Counseling: I discussed with the patient the risks of dupilumab including but not limited to eye inflammation and irritation, cold sores, injection site reactions, allergic reactions and increased risk of parasitic infection. The patient understands that monitoring is required and they must alert us or the primary physician if symptoms of infection or other concerning signs are noted.
Protopic Counseling: \\nPatient Education Handout provided to pt regarding medication:\\n Topical Tacrolimus (Brand name: Protopic)  \\n-	Dermatologists prefer topical tacrolimus for treating certain rashes on sensitive areas of your body such as the face (including eyelids), groin, & body folds (such as the armpits). \\n-	It reduces inflammation on the skin. This improves the itching, flaking, and/or redness. \\n-	Since it is not a steroid, it won’t thin the skin and is safe for sensitive body areas.\\n-	At most pharmacies it is dispensed in a Vaseline-like form. \\n-	At a specialty pharmacy it can be compounded into a cream for a fixed cash price. \\nCost: If your insurance denies coverage, please use [email or web address] to obtain a coupon to purchase it for cheaper. We also may send this prescription to a specialty pharmacy to get it for the cheapest price possible.\\nAlternatives: Hydrocortisone 2.5% (a prescription topical steroid) is an alternative, but it should not be used around the eyes due to the risk of causing glaucoma. \\nUse: Tacrolimus is FDA approved for use with eczema (atopic dermatitis). It is also commonly used “off-label” for other rashes that affect sensitive areas of the body. \\nPlease use this twice a day to affected areas until clear or symptoms resolve. Repeat as needed. \\nIf not improved after using for 4-6 weeks continuously, please stop use and follow up with me in clinic.  \\nNotable Side Effects: You may feel a warm, burning, or stinging sensation when applied to the skin. This usually improves as the rash improves. Alcohol intake may worsen the burning sensation as well as cause redness and flushing.\\nTo reduce the risk of a burning sensation: \\n1.	Place the tube in the fridge before first time use (off-label).\\n2.	First test it on the forearm before applying it to sensitive areas. \\n          Once tolerated, you may then try storing it at room temperature. \\nBox Warning: Rare cases of malignancy/cancer have been reported in patients treated with topical calcineurin inhibitors, but the relationship has not been proven. A large-scale study known as the APPLES study1 monitored children who used the medication and did not see an increased risk of cancer. I do NOT recommend the use of this medication if there is any concern, history, or treatment of an underlying cancer such as lymphoma or leukemia. Do not use it continuously for a long time and limit use to only the affected areas.\\p1Ltpyor AS, Denise R, Ericka SC, et al. No evidence of increased cancer incidence in children using topical tacrolimus for atopic dermatitis. J Am Acad Dermatol. 2020;83(2):375-381
Otezla Counseling: The side effects of Otezla were discussed with the patient, including but not limited to worsening or new depression, weight loss, diarrhea, nausea, upper respiratory tract infection, and headache. Patient instructed to call the office should any adverse effect occur.  The patient verbalized understanding of the proper use and possible adverse effects of Otezla.  All the patient's questions and concerns were addressed.
Sotyktu Counseling:  I discussed the most common side effects of Sotyktu including: common cold, sore throat, sinus infections, cold sores, canker sores, folliculitis, and acne.  I also discussed more serious side effects of Sotyktu including but not limited to: serious allergic reactions; increased risk for infections such as TB; cancers such as lymphomas; rhabdomyolysis and elevated CPK; and elevated triglycerides and liver enzymes. 
Propranolol Counseling:  I discussed with the patient the risks of propranolol including but not limited to low heart rate, low blood pressure, low blood sugar, restlessness and increased cold sensitivity. They should call the office if they experience any of these side effects.
Qbrexza Pregnancy And Lactation Text: There is no available data on Qbrexza use in pregnant women.  There is no available data on Qbrexza use in lactation.
Propranolol Pregnancy And Lactation Text: This medication is Pregnancy Category C and it isn't known if it is safe during pregnancy. It is excreted in breast milk.
Cyclophosphamide Pregnancy And Lactation Text: This medication is Pregnancy Category D and it isn't considered safe during pregnancy. This medication is excreted in breast milk.
Topical Steroids Counseling: I discussed with the patient that prolonged use of topical steroids can result in the increased appearance of superficial blood vessels (telangiectasias), lightening (hypopigmentation) and thinning of the skin (atrophy).  Patient understands to avoid using high potency steroids in skin folds, the groin or the face.  The patient verbalized understanding of the proper use and possible adverse effects of topical steroids.  All of the patient's questions and concerns were addressed.
Ivermectin Counseling:  Patient instructed to take medication on an empty stomach with a full glass of water.  Patient informed of potential adverse effects including but not limited to nausea, diarrhea, dizziness, itching, and swelling of the extremities or lymph nodes.  The patient verbalized understanding of the proper use and possible adverse effects of ivermectin.  All of the patient's questions and concerns were addressed.
Glycopyrrolate Counseling:  I discussed with the patient the risks of glycopyrrolate including but not limited to skin rash, drowsiness, dry mouth, difficulty urinating, and blurred vision.
Opzelura Pregnancy And Lactation Text: There is insufficient data to evaluate drug-associated risk for major birth defects, miscarriage, or other adverse maternal or fetal outcomes.  There is a pregnancy registry that monitors pregnancy outcomes in pregnant persons exposed to the medication during pregnancy.  It is unknown if this medication is excreted in breast milk.  Do not breastfeed during treatment and for about 4 weeks after the last dose.
Erythromycin Counseling:  I discussed with the patient the risks of erythromycin including but not limited to GI upset, allergic reaction, drug rash, diarrhea, increase in liver enzymes, and yeast infections.
Odomzo Counseling- I discussed with the patient the risks of Odomzo including but not limited to nausea, vomiting, diarrhea, constipation, weight loss, changes in the sense of taste, decreased appetite, muscle spasms, and hair loss.  The patient verbalized understanding of the proper use and possible adverse effects of Odomzo.  All of the patient's questions and concerns were addressed.
Azelaic Acid Counseling: Patient counseled that medicine may cause skin irritation and to avoid applying near the eyes.  In the event of skin irritation, the patient was advised to reduce the amount of the drug applied or use it less frequently.   The patient verbalized understanding of the proper use and possible adverse effects of azelaic acid.  All of the patient's questions and concerns were addressed.
Azathioprine Pregnancy And Lactation Text: This medication is Pregnancy Category D and isn't considered safe during pregnancy. It is unknown if this medication is excreted in breast milk.
Niacinamide Pregnancy And Lactation Text: These medications are considered safe during pregnancy.
Stelara Counseling:  I discussed with the patient the risks of ustekinumab including but not limited to immunosuppression, malignancy, posterior leukoencephalopathy syndrome, and serious infections.  The patient understands that monitoring is required including a PPD at baseline and must alert us or the primary physician if symptoms of infection or other concerning signs are noted.
Wegovy Counseling: I reviewed the possible side effects including: thyroid tumors, kidney disease, gallbladder disease, abdominal pain, constipation, diarrhea, nausea, vomiting and pancreatitis. Do not take this medication if you have a history or family history of multiple endocrine neoplasia syndrome type 2. Side effects reviewed, pt to contact office should one occur.
Sotyktu Pregnancy And Lactation Text: There is insufficient data to evaluate whether or not Sotyktu is safe to use during pregnancy.   It is not known if Sotyktu passes into breast milk and whether or not it is safe to use when breastfeeding.  
Low Dose Naltrexone Counseling- I discussed with the patient the potential risks and side effects of low dose naltrexone including but not limited to: more vivid dreams, headaches, nausea, vomiting, abdominal pain, fatigue, dizziness, and anxiety.
Cyclosporine Counseling:  I discussed with the patient the risks of cyclosporine including but not limited to hypertension, gingival hyperplasia,myelosuppression, immunosuppression, liver damage, kidney damage, neurotoxicity, lymphoma, and serious infections. The patient understands that monitoring is required including baseline blood pressure, CBC, CMP, lipid panel and uric acid, and then 1-2 times monthly CMP and blood pressure.
VTAMA Counseling: I discussed with the patient that VTAMA is not for use in the eyes, mouth or mouth. They should call the office if they develop any signs of allergic reactions to VTAMA. The patient verbalized understanding of the proper use and possible adverse effects of VTAMA.  All of the patient's questions and concerns were addressed.
Cosentyx Counseling:  I discussed with the patient the risks of Cosentyx including but not limited to worsening of Crohn's disease, immunosuppression, allergic reactions and infections.  The patient understands that monitoring is required including a PPD at baseline and must alert us or the primary physician if symptoms of infection or other concerning signs are noted.
Erivedge Counseling- I discussed with the patient the risks of Erivedge including but not limited to nausea, vomiting, diarrhea, constipation, weight loss, changes in the sense of taste, decreased appetite, muscle spasms, and hair loss.  The patient verbalized understanding of the proper use and possible adverse effects of Erivedge.  All of the patient's questions and concerns were addressed.
Rifampin Pregnancy And Lactation Text: This medication is Pregnancy Category C and it isn't know if it is safe during pregnancy. It is also excreted in breast milk and should not be used if you are breast feeding.
Bexarotene Pregnancy And Lactation Text: This medication is Pregnancy Category X and should not be given to women who are pregnant or may become pregnant. This medication should not be used if you are breast feeding.
Erythromycin Pregnancy And Lactation Text: This medication is Pregnancy Category B and is considered safe during pregnancy. It is also excreted in breast milk.
Cibinqo Pregnancy And Lactation Text: It is unknown if this medication will adversely affect pregnancy or breast feeding.  You should not take this medication if you are currently pregnant or planning a pregnancy or while breastfeeding.
Opioid Counseling: I discussed with the patient the potential side effects of opioids including but not limited to addiction, altered mental status, and depression. I stressed avoiding alcohol, benzodiazepines, muscle relaxants and sleep aids unless specifically okayed by a physician. The patient verbalized understanding of the proper use and possible adverse effects of opioids. All of the patient's questions and concerns were addressed. They were instructed to flush the remaining pills down the toilet if they did not need them for pain.
Bimzelx Pregnancy And Lactation Text: This medication crosses the placenta and the safety is uncertain during pregnancy. It is unknown if this medication is present in breast milk.
Doxycycline Counseling: Patient counseled regarding possible photosensitivity and increased risk for sunburn. Patient instructed to avoid sunlight, if possible. When exposed to sunlight, patients should wear protective clothing, sunglasses, and sunscreen. Counseled on GI AE / can cause nausea/vomiting/abdominal pain among other symptoms. The patient verbalized understanding of the proper use and possible adverse effects of doxycycline. To take with light meal to avoid any upset stomach (nausea vomiting or ab pain). Not to take concurrently with vitamins or dairy products (take at separate times). Take with full glass of water. Do not lay down for at least 1 hour after taking. All of the patient's questions and concerns were addressed. Female patients should avoid pregnancy while on therapy due to potential birth defects.
Tremfya Counseling: I discussed with the patient the risks of guselkumab including but not limited to immunosuppression, serious infections, and drug reactions.  The patient understands that monitoring is required including a PPD at baseline and must alert us or the primary physician if symptoms of infection or other concerning signs are noted.
Colchicine Counseling:  Patient counseled regarding adverse effects including but not limited to stomach upset (nausea, vomiting, stomach pain, or diarrhea).  Patient instructed to limit alcohol consumption while taking this medication.  Colchicine may reduce blood counts especially with prolonged use.  The patient understands that monitoring of kidney function and blood counts may be required, especially at baseline. The patient verbalized understanding of the proper use and possible adverse effects of colchicine.  All of the patient's questions and concerns were addressed.
Infliximab Counseling:  I discussed with the patient the risks of infliximab including but not limited to myelosuppression, immunosuppression, autoimmune hepatitis, demyelinating diseases, lymphoma, and serious infections.  The patient understands that monitoring is required including a PPD at baseline and must alert us or the primary physician if symptoms of infection or other concerning signs are noted.
Doxepin Pregnancy And Lactation Text: This medication is Pregnancy Category C and it isn't known if it is safe during pregnancy. It is also excreted in breast milk and breast feeding isn't recommended.
Elidel Counseling: Patient may experience a mild burning sensation during topical application. Elidel is not approved in children less than 2 years of age. There have been case reports of hematologic and skin malignancies in patients using topical calcineurin inhibitors although causality is questionable.
Valtrex Counseling: I discussed with the patient the risks of valacyclovir including but not limited to kidney damage, nausea, vomiting and severe allergy.  The patient understands that if the infection seems to be worsening or is not improving, they are to call.
Topical Metronidazole Pregnancy And Lactation Text: This medication is Pregnancy Category B and considered safe during pregnancy.  It is also considered safe to use while breastfeeding.
Oxybutynin Counseling:  I discussed with the patient the risks of oxybutynin including but not limited to skin rash, drowsiness, dry mouth, difficulty urinating, and blurred vision.
Nsaids Pregnancy And Lactation Text: These medications are considered safe up to 30 weeks gestation. It is excreted in breast milk.
Cimzia Pregnancy And Lactation Text: This medication crosses the placenta but can be considered safe in certain situations. Cimzia may be excreted in breast milk.
High Dose Vitamin A Counseling: Side effects reviewed, pt to contact office should one occur.
Arava Counseling:  Patient counseled regarding adverse effects of Arava including but not limited to nausea, vomiting, abnormalities in liver function tests. Patients may develop mouth sores, rash, diarrhea, and abnormalities in blood counts. The patient understands that monitoring is required including LFTs and blood counts.  There is a rare possibility of scarring of the liver and lung problems that can occur when taking methotrexate. Persistent nausea, loss of appetite, pale stools, dark urine, cough, and shortness of breath should be reported immediately. Patient advised to discontinue Arava treatment and consult with a physician prior to attempting conception. The patient will have to undergo a treatment to eliminate Arava from the body prior to conception.
Bexarotene Counseling:  I discussed with the patient the risks of bexarotene including but not limited to hair loss, dry lips/skin/eyes, liver abnormalities, hyperlipidemia, pancreatitis, depression/suicidal ideation, photosensitivity, drug rash/allergic reactions, hypothyroidism, anemia, leukopenia, infection, cataracts, and teratogenicity.  Patient understands that they will need regular blood tests to check lipid profile, liver function tests, white blood cell count, thyroid function tests and pregnancy test if applicable.
Opioid Pregnancy And Lactation Text: These medications can lead to premature delivery and should be avoided during pregnancy. These medications are also present in breast milk in small amounts.
Finasteride Male Counseling: Finasteride Counseling:  I discussed with the patient the risks of use of finasteride including but not limited to decreased libido, decreased ejaculate volume, gynecomastia, and depression. Women should not handle medication.  All of the patient's questions and concerns were addressed.
Thalidomide Counseling: I discussed with the patient the risks of thalidomide including but not limited to birth defects, anxiety, weakness, chest pain, dizziness, cough and severe allergy.
Solaraze Pregnancy And Lactation Text: This medication is Pregnancy Category B and is considered safe. There is some data to suggest avoiding during the third trimester. It is unknown if this medication is excreted in breast milk.
Zepbound Counseling: I reviewed the possible side effects including: thyroid tumors, kidney disease, gallbladder disease, abdominal pain, constipation, diarrhea, nausea, vomiting and pancreatitis. Do not take this medication if you have a history or family history of multiple endocrine neoplasia syndrome type 2. Side effects reviewed, pt to contact office should one occur.
Dutasteride Pregnancy And Lactation Text: This medication is absolutely contraindicated in women, especially during pregnancy and breast feeding. Feminization of male fetuses is possible if taking while pregnant.
Adbry Pregnancy And Lactation Text: It is unknown if this medication will adversely affect pregnancy or breast feeding.
Methotrexate Counseling:  Patient counseled regarding adverse effects of methotrexate including but not limited to nausea, vomiting, abnormalities in liver function tests. Patients may develop mouth sores, rash, diarrhea, and abnormalities in blood counts. The patient understands that monitoring is required including LFT's and blood counts.  There is a rare possibility of scarring of the liver and lung problems that can occur when taking methotrexate. Persistent nausea, loss of appetite, pale stools, dark urine, cough, and shortness of breath should be reported immediately. Patient advised to discontinue methotrexate treatment at least three months before attempting to become pregnant.  I discussed the need for folate supplements while taking methotrexate.  These supplements can decrease side effects during methotrexate treatment. The patient verbalized understanding of the proper use and possible adverse effects of methotrexate.  All of the patient's questions and concerns were addressed.
Soolantra Pregnancy And Lactation Text: This medication is Pregnancy Category C. This medication is considered safe during breast feeding.
Methotrexate Pregnancy And Lactation Text: This medication is Pregnancy Category X and is known to cause fetal harm. This medication is excreted in breast milk.
Cellcept Counseling:  I discussed with the patient the risks of mycophenolate mofetil including but not limited to infection/immunosuppression, GI upset, hypokalemia, hypercholesterolemia, bone marrow suppression, lymphoproliferative disorders, malignancy, GI ulceration/bleed/perforation, colitis, interstitial lung disease, kidney failure, progressive multifocal leukoencephalopathy, and birth defects.  The patient understands that monitoring is required including a baseline creatinine and regular CBC testing. In addition, patient must alert us immediately if symptoms of infection or other concerning signs are noted.

## 2025-02-13 NOTE — PROCEDURE: PRESCRIPTION MEDICATION MANAGEMENT
Initiate Treatment: Triple cream, Tacrolimus
Detail Level: Zone
Render In Strict Bullet Format?: No
Discontinue Regimen: Ketoconazole cream

## 2025-02-13 NOTE — HPI: RASH
How Severe Is Your Rash?: mild
Is This A New Presentation, Or A Follow-Up?: Rash
Additional History: Has tried oils for scalp

## 2025-02-13 NOTE — PROCEDURE: DIAGNOSIS COMMENT
Detail Level: Simple
Render Risk Assessment In Note?: no
Comment: Pt will call if not improved in 1 month to re-eval, will add to clinic\\n- Has suffered from this for many years.\\nLast time, topical tacrolimus and topical keto resolved it very fast. topical tacro she has may be . Rx resent for her. However, will trial triple cream for rosacea given she has tried topical metro in the past.

## 2025-02-20 ENCOUNTER — OFFICE VISIT (OUTPATIENT)
Dept: OBGYN CLINIC | Facility: CLINIC | Age: 40
End: 2025-02-20

## 2025-02-20 VITALS
DIASTOLIC BLOOD PRESSURE: 68 MMHG | SYSTOLIC BLOOD PRESSURE: 104 MMHG | WEIGHT: 120 LBS | HEIGHT: 63 IN | BODY MASS INDEX: 21.26 KG/M2

## 2025-02-20 DIAGNOSIS — N92.6 IRREGULAR PERIODS/MENSTRUAL CYCLES: ICD-10-CM

## 2025-02-20 DIAGNOSIS — N94.10 DYSPAREUNIA IN FEMALE: ICD-10-CM

## 2025-02-20 DIAGNOSIS — N80.03 ADENOMYOSIS: Primary | ICD-10-CM

## 2025-02-20 PROCEDURE — 99212 OFFICE O/P EST SF 10 MIN: CPT | Performed by: STUDENT IN AN ORGANIZED HEALTH CARE EDUCATION/TRAINING PROGRAM

## 2025-02-20 PROCEDURE — 3008F BODY MASS INDEX DOCD: CPT | Performed by: STUDENT IN AN ORGANIZED HEALTH CARE EDUCATION/TRAINING PROGRAM

## 2025-02-20 PROCEDURE — 3074F SYST BP LT 130 MM HG: CPT | Performed by: STUDENT IN AN ORGANIZED HEALTH CARE EDUCATION/TRAINING PROGRAM

## 2025-02-20 PROCEDURE — 96372 THER/PROPH/DIAG INJ SC/IM: CPT | Performed by: STUDENT IN AN ORGANIZED HEALTH CARE EDUCATION/TRAINING PROGRAM

## 2025-02-20 PROCEDURE — 3078F DIAST BP <80 MM HG: CPT | Performed by: STUDENT IN AN ORGANIZED HEALTH CARE EDUCATION/TRAINING PROGRAM

## 2025-02-20 RX ORDER — DIPHENHYDRAMINE HCL 25 MG
TABLET ORAL AS DIRECTED
COMMUNITY

## 2025-02-20 RX ORDER — NORETHINDRONE ACETATE AND ETHINYL ESTRADIOL 1MG-20(21)
KIT ORAL
Qty: 56 TABLET | Refills: 0 | Status: SHIPPED | OUTPATIENT
Start: 2025-02-20

## 2025-02-20 RX ORDER — KETOCONAZOLE 20 MG/ML
SHAMPOO, SUSPENSION TOPICAL
COMMUNITY
Start: 2025-02-14

## 2025-02-20 RX ORDER — DROSPIRENONE AND ESTETROL 3-14.2(28)
1 KIT ORAL DAILY
Qty: 84 TABLET | Refills: 4 | Status: SHIPPED | OUTPATIENT
Start: 2025-02-20 | End: 2025-03-22

## 2025-02-20 RX ORDER — TACROLIMUS 1 MG/G
1 OINTMENT TOPICAL 2 TIMES DAILY
COMMUNITY
Start: 2025-02-14

## 2025-02-20 RX ADMIN — KETOROLAC TROMETHAMINE 30 MG: 30 INJECTION, SOLUTION INTRAMUSCULAR; INTRAVENOUS at 08:20:00

## 2025-02-20 NOTE — PROGRESS NOTES
[facility]  Obstetrics and Gynecology  Gynecology Established Problem Exam    Chief Complaint   Patient presents with    Gyn Exam     Lower abdominal pain , heavy bleeding, blood clots,            Monika Monae is a 39 year old female presenting for Gyn Exam (Lower abdominal pain , heavy bleeding, blood clots, )   .  Feels like cycles are getting worse and worse. Large clots, and changing often at work despite jumbo/overnight pad and tampon.     Feels like someone is cutting uterus during period.   Sex is always painful. Strain on marriage. Found one position that helps but recent menstrual changes make more challenging.     Previous OCP created mood/depression sxs.       Medications (Active prior to today's visit):  Current Outpatient Medications   Medication Sig Dispense Refill    ketoconazole 2 % External Shampoo Apply TO SCALP ONCE DAILY, lather ON AND LEAVE ON FOR 3-5 MINUTES THEN RINSE      tacrolimus 0.1 % External Ointment Apply 1 Application topically 2 (two) times daily. APPLY TO AFFECTED AREA      triamcinolone 0.1 % External Ointment Apply 1 Application topically nightly. 15 g 0    lidocaine-prilocaine 2.5-2.5 % External Cream Apply 1 Application topically as needed (Apply to area of thickened vulvar skin approx 1-2 hours prior to biopsy procedure, and after if pain or itching from healing). 30 g 0    diphenhydrAMINE HCl 25 MG Oral Tab Take by mouth As Directed. (Patient not taking: Reported on 2025)      LORazepam 1 MG Oral Tab  (Patient not taking: Reported on 2025)      diphenhydrAMINE 50 MG Oral Cap Take 1 capsule (50 mg total) by mouth nightly as needed. (Patient not taking: Reported on 2025)       Allergies:  Allergies[1]  HISTORY:     OB History    Para Term  AB Living   2 2 1 1 0 2   SAB IAB Ectopic Multiple Live Births           2      # Outcome Date GA Lbr Skip/2nd Weight Sex Type Anes PTL Lv   2  21 33w4d  7 lb 6 oz (3.345 kg) F Caesarean Spinal N MARTIN       Complications: Group B beta strep +, Preeclampsia   1 Term 08 40w0d  5 lb 6 oz (2.438 kg) F CS-Unspec Spinal  MARTIN      Birth Comments:  for severe oligohydramnios and pre-eclampsia-no labor. pt voices she was pre-diabetic with pregnancy.      Complications: Meconium, Oligohydramnios, Oligohydramnios (HCC)          Hx Prior Abnormal Pap: Yes      Past Medical History:    ADHD    Anxiety    Chronic post-traumatic stress disorder (PTSD)    Decorative tattoo    Depression    Gestational diabetes mellitus (GDM) in third trimester (HCC)    History of anorexia nervosa       Past Surgical History:   Procedure Laterality Date    Breast prosthesis, silicone or equal, with integral adhesive Bilateral     saline implants                    Family History   Problem Relation Age of Onset    Other (Other) Father          of MVA    Stroke Father     Personality Disorder Father     Hypertension Mother     Diabetes Mother     Personality Disorder Mother     Diabetes Maternal Grandmother     Stroke Maternal Grandfather     Other (systemic lupus) Sister 40    Other (Other) Cousin 35    Breast Cancer Neg     Ovarian Cancer Neg     Uterine Cancer Neg     Colon Cancer Neg        Social History     Socioeconomic History    Marital status:      Spouse name: Not on file    Number of children: Not on file    Years of education: Not on file    Highest education level: Not on file   Occupational History    Not on file   Tobacco Use    Smoking status: Former     Current packs/day: 0.25     Average packs/day: 0.3 packs/day for 16.0 years (4.0 ttl pk-yrs)     Types: Cigarettes    Smokeless tobacco: Never   Vaping Use    Vaping status: Former    Substances: Nicotine   Substance and Sexual Activity    Alcohol use: Yes     Comment: per pt socially     Drug use: Not Currently     Types: Cannabis     Comment: \"doesn't work for her\"    Sexual activity: Not on file   Other Topics Concern    Not on file    Social History Narrative    Not on file     Social Drivers of Health     Food Insecurity: Not on file   Transportation Needs: Not on file   Stress: Not on file   Housing Stability: Not on file       ROS:   Review of Systems:    General: no fevers, chills, unintended weight loss/gain except as above  Cardiovascular: no chest pain, new or unexplained SOB except as above  Gastrointestinal: no nausea/vomiting, diarrhea, or blood in stool except as above  Respiratory: no new symptoms reported except as above  Skin: no new symptoms reported except as above  Psychiatric: no new symptoms reported except as above  PHYSICAL EXAM:   /68   Ht 5' 3\" (1.6 m)   Wt 120 lb (54.4 kg)   LMP 02/17/2025 (Exact Date)   BMI 21.26 kg/m²     Physical Exam  HENT:      Head: Normocephalic and atraumatic.   Eyes:      Extraocular Movements: Extraocular movements intact.   Pulmonary:      Effort: Pulmonary effort is normal.   Abdominal:      Palpations: Abdomen is soft.   Genitourinary:     Comments: Deferred.   Musculoskeletal:      Cervical back: Normal range of motion.   Skin:     General: Skin is warm and dry.   Neurological:      General: No focal deficit present.      Mental Status: She is alert. Mental status is at baseline.   Psychiatric:         Behavior: Behavior normal.         Thought Content: Thought content normal.              RESULTS & IMAGING         No results for input(s): \"URINEPREG\" in the last 72 hours.    No results for input(s): \"PGLU\", \"POCTGLUCOSE\" in the last 72 hours.    No results for input(s): \"GLUCOSEDIP\", \"BILIRUBIN\", \"KETONESDIP\", \"BLOODU\", \"PHURINE\", \"UROBILIN\", \"NITRITE\", \"LEUKOCYTES\", \"APPEARANCE\", \"URINECOLOR\" in the last 72 hours.    Invalid input(s): \"SPECGRAV\"       ASSESSMENT & PLAN     Adenomyosis (Primary)  -     z [employer]ight US PELVIS (TRANSABDOMINAL AND TRANSVAGINAL) (CPT=76856/74318) [[id number]]; Future; Expected date: 02/20/2025  -     [facility] [employer] US PELVIS (TRANSABDOMINAL AND TRANSVAGINAL)  (GUJ=98584/64005) [[id number]]  Irregular periods/menstrual cycles  -     [facility] [employer] US PELVIS (TRANSABDOMINAL AND TRANSVAGINAL) (CPT=76856/19249) [[id number]]; Future; Expected date: 02/20/2025  -     [facility] [employer] US PELVIS (TRANSABDOMINAL AND TRANSVAGINAL) (CPT=76856/83788) [[id number]]    Repeat US ordered.   Plan for bleeding is OCP taper (Junel) to address acute bleeding and then initiate Nextstellis (brochure/discount card given) due to described selective tissue effect of Estetrol. If not successful, can consider extended cycling with OCP vs transitioning to Slynd which has no estrogen. Can also consider GnRH medications prior to hysterectomy if ultimately desires surgical management.     Follow up in 6-8 weeks.       Berta Velez MD  2/20/2025  3:07 PM               [1] No Known Allergies

## 2025-02-21 RX ORDER — KETOROLAC TROMETHAMINE 30 MG/ML
30 INJECTION, SOLUTION INTRAMUSCULAR; INTRAVENOUS ONCE
Status: COMPLETED | OUTPATIENT
Start: 2025-02-21 | End: 2025-02-20

## 2025-04-22 NOTE — PROGRESS NOTES
[facility]  Obstetrics and Gynecology  Ultrasound Visit  Raul Cruz MD    CHIRAG Torres is a 29year old  with Patient's last menstrual period was 2020 (exact date).  who presents for ultrasound to confirm viability and da Financial resource strain: Not on file      Food insecurity        Worry: Not on file        Inability: Not on file      Transportation needs        Medical: Not on file        Non-medical: Not on file    Tobacco Use      Smoking status: Never Smoker exercise, activity and prenatal vitamins. Patient counseled on nausea and vomiting in pregnancy. We discussed initial treatment options that include over-the-counter vitamin B6 and Unisom, both can be taken 25 mg every 6 hours.   I counseled patient on th Term

## 2025-06-07 ENCOUNTER — OFFICE VISIT (OUTPATIENT)
Dept: OBGYN CLINIC | Facility: CLINIC | Age: 40
End: 2025-06-07
Payer: COMMERCIAL

## 2025-06-07 VITALS — WEIGHT: 124 LBS | BODY MASS INDEX: 22 KG/M2 | SYSTOLIC BLOOD PRESSURE: 101 MMHG | DIASTOLIC BLOOD PRESSURE: 70 MMHG

## 2025-06-07 DIAGNOSIS — Z30.011 ENCOUNTER FOR INITIAL PRESCRIPTION OF CONTRACEPTIVE PILLS: ICD-10-CM

## 2025-06-07 DIAGNOSIS — R10.2 PELVIC PAIN: ICD-10-CM

## 2025-06-07 DIAGNOSIS — N92.0 MENORRHAGIA WITH REGULAR CYCLE: ICD-10-CM

## 2025-06-07 DIAGNOSIS — N92.6 IRREGULAR PERIODS/MENSTRUAL CYCLES: Primary | ICD-10-CM

## 2025-06-07 PROCEDURE — 3078F DIAST BP <80 MM HG: CPT | Performed by: STUDENT IN AN ORGANIZED HEALTH CARE EDUCATION/TRAINING PROGRAM

## 2025-06-07 PROCEDURE — 3074F SYST BP LT 130 MM HG: CPT | Performed by: STUDENT IN AN ORGANIZED HEALTH CARE EDUCATION/TRAINING PROGRAM

## 2025-06-07 PROCEDURE — 99213 OFFICE O/P EST LOW 20 MIN: CPT | Performed by: STUDENT IN AN ORGANIZED HEALTH CARE EDUCATION/TRAINING PROGRAM

## 2025-06-07 RX ORDER — CLOBETASOL PROPIONATE 0.5 MG/G
1 OINTMENT TOPICAL 2 TIMES DAILY
Qty: 15 G | Refills: 0 | Status: SHIPPED | OUTPATIENT
Start: 2025-06-07

## 2025-06-07 RX ORDER — DROSPIRENONE 4 MG/1
1 TABLET, FILM COATED ORAL DAILY
Qty: 84 TABLET | Refills: 4 | Status: SHIPPED | OUTPATIENT
Start: 2025-06-07 | End: 2026-06-07

## 2025-06-07 RX ORDER — GABAPENTIN 100 MG/1
100 CAPSULE ORAL 2 TIMES DAILY PRN
Qty: 40 CAPSULE | Refills: 0 | Status: SHIPPED | OUTPATIENT
Start: 2025-06-07

## 2025-06-07 RX ORDER — DULOXETIN HYDROCHLORIDE 20 MG/1
20 CAPSULE, DELAYED RELEASE ORAL DAILY
Qty: 30 CAPSULE | Refills: 2 | Status: SHIPPED | OUTPATIENT
Start: 2025-06-07

## 2025-06-07 NOTE — PROGRESS NOTES
[facility]  Obstetrics and Gynecology  Gynecology Established Problem Exam    Chief Complaint   Patient presents with    Follow - Up     Patient stopped her OCP's on .                  Monika Monae is a 39 year old female presenting for Follow - Up (Patient stopped her OCP's on . )  .     Took Junel taper and bleeding improvement then transition to Nextstellis which was \"okay\" for 2 cycles; April was okay and then May was worse. Had 3 weeks of bleeding in May even on Nextstellis so stopped Nexstellis in May. Transitioned jobs and short term disability; resets clock on when she is eligible for benefits when considering hysterectomy.       Medications (Active prior to today's visit):  Current Medications[1]  Allergies:  Allergies[2]  HISTORY:     OB History    Para Term  AB Living   2 2 1 1 0 2   SAB IAB Ectopic Multiple Live Births       2      # Outcome Date GA Lbr Skip/2nd Weight Sex Type Anes PTL Lv   2  21 33w4d  7 lb 6 oz (3.345 kg) F Caesarean Spinal N MARTIN      Complications: Group B beta strep +, Preeclampsia   1 Term 08 40w0d  5 lb 6 oz (2.438 kg) F CS-Unspec Spinal  MARTIN      Birth Comments:  for severe oligohydramnios and pre-eclampsia-no labor. pt voices she was pre-diabetic with pregnancy.      Complications: Meconium, Oligohydramnios, Oligohydramnios (HCC)                 Past Medical History[3]    Past Surgical History[4]    Family History[5]    Social History     Socioeconomic History    Marital status:      Spouse name: Not on file    Number of children: Not on file    Years of education: Not on file    Highest education level: Not on file   Occupational History    Not on file   Tobacco Use    Smoking status: Former     Current packs/day: 0.25     Average packs/day: 0.3 packs/day for 16.0 years (4.0 ttl pk-yrs)     Types: Cigarettes    Smokeless tobacco: Never   Vaping Use    Vaping status: Former    Substances: Nicotine   Substance and Sexual  Activity    Alcohol use: Yes     Comment: per pt socially     Drug use: Not Currently     Types: Cannabis     Comment: \"doesn't work for her\"    Sexual activity: Not on file   Other Topics Concern    Not on file   Social History Narrative    Not on file     Social Drivers of Health     Food Insecurity: Not on file   Transportation Needs: Not on file   Stress: Not on file   Housing Stability: Not on file       ROS:   Review of Systems:    General: no fevers, chills, unintended weight loss/gain except as above  Cardiovascular: no chest pain, new or unexplained SOB except as above  Gastrointestinal: no nausea/vomiting, diarrhea, or blood in stool except as above  Respiratory: no new symptoms reported except as above  Skin: no new symptoms reported except as above  Psychiatric: no new symptoms reported except as above  PHYSICAL EXAM:   /70   Wt 124 lb (56.2 kg)   LMP 02/17/2025 (Exact Date)   BMI 21.97 kg/m²     Physical Exam         RESULTS & IMAGING         No results for input(s): \"URINEPREG\" in the last 72 hours.    No results for input(s): \"PGLU\", \"POCTGLUCOSE\" in the last 72 hours.    No results for input(s): \"GLUCOSEDIP\", \"BILIRUBIN\", \"KETONESDIP\", \"BLOODU\", \"PHURINE\", \"UROBILIN\", \"NITRITE\", \"LEUKOCYTES\", \"APPEARANCE\", \"URINECOLOR\" in the last 72 hours.    Invalid input(s): \"SPECGRAV\"       ASSESSMENT & PLAN     There are no diagnoses linked to this encounter.    Given Nextstellis without adequate symptom control and concerns about prev estreogen response will trial Slynd for contraception + duloxetine for anxiety/depression/pain  Gabapentin PRN for pain + ibuprofen to see if neuropathic component    Reports continued skin irritation at site of previous lesions (normal bx previously) despite triamcinolone --> short course clobetasol as more potent steroid    F/u 3 months to reassess.     Berta Velez MD  6/7/2025  9:15 AM               [1]   Current Outpatient Medications   Medication Sig Dispense  Refill    Drospirenone (SLYND) 4 MG Oral Tab Take 1 tablet by mouth daily. 84 tablet 4    DULoxetine 20 MG Oral Cap DR Particles Take 1 capsule (20 mg total) by mouth daily. 30 capsule 2    gabapentin 100 MG Oral Cap Take 1 capsule (100 mg total) by mouth 2 (two) times daily as needed. 40 capsule 0    clobetasol 0.05 % External Ointment Apply 1 g topically 2 (two) times daily. 15 g 0    tacrolimus 0.1 % External Ointment Apply 1 Application topically 2 (two) times daily. APPLY TO AFFECTED AREA      triamcinolone 0.1 % External Ointment Apply 1 Application topically nightly. 15 g 0    ketoconazole 2 % External Shampoo Apply TO SCALP ONCE DAILY, lather ON AND LEAVE ON FOR 3-5 MINUTES THEN RINSE (Patient not taking: Reported on 2025)      diphenhydrAMINE HCl 25 MG Oral Tab Take by mouth As Directed. (Patient not taking: Reported on 2025)      LORazepam 1 MG Oral Tab  (Patient not taking: Reported on 2025)      diphenhydrAMINE 50 MG Oral Cap Take 1 capsule (50 mg total) by mouth nightly as needed. (Patient not taking: Reported on 2025)      lidocaine-prilocaine 2.5-2.5 % External Cream Apply 1 Application topically as needed (Apply to area of thickened vulvar skin approx 1-2 hours prior to biopsy procedure, and after if pain or itching from healing). 30 g 0   [2] No Known Allergies  [3]   Past Medical History:   ADHD    Anxiety    Chronic post-traumatic stress disorder (PTSD)    Decorative tattoo    Depression    Gestational diabetes mellitus (GDM) in third trimester (HCC)    History of anorexia nervosa   [4]   Past Surgical History:  Procedure Laterality Date    Breast prosthesis, silicone or equal, with integral adhesive Bilateral     saline implants                [5]   Family History  Problem Relation Age of Onset    Other (Other) Father          of MVA    Stroke Father     Personality Disorder Father     Hypertension Mother     Diabetes Mother     Personality Disorder  Mother     Diabetes Maternal Grandmother     Stroke Maternal Grandfather     Other (systemic lupus) Sister 40    Other (Other) Cousin 35    Breast Cancer Neg     Ovarian Cancer Neg     Uterine Cancer Neg     Colon Cancer Neg

## (undated) NOTE — LETTER
7/10/2021              [phone]         To Whom It May Concern,    This letter is to inform you that our office has made an attempt to reach you by letter to schedule a post partum shanice

## (undated) NOTE — LETTER
10/20/2020          To Whom It May Concern:    Tino Prabhakar is currently under my medical care. Sahara Mendoza would benefit from working from home for the next 2 weeks, due to medical issues. If you require additional information please contact our office.

## (undated) NOTE — LETTER
6/4/2021              [address] [facility] [address]         Dear Juan Luis Houston,    This letter is to inform you that our office has made several attempts to reach you by phone without success.   We were attempting to co

## (undated) NOTE — LETTER
10/13/2020          To Whom It May Concern:    Fanta Freitas is currently under my medical. Ju Leiva was seen today in the office for medical issue. Please excuse her from work today. If you require additional information please contact our office.

## (undated) NOTE — LETTER
[facility]  Administration of Anesthesia  1. I, Librado Gann, or _________________________________ acting on her behalf, (Patient) (Dependent/Representative) request to receive anesthesia for my pending procedure/operation/treatment. bleeding, seizure, cardiac arrest and death. 7. AWARENESS: I understand that it is possible (but unlikely) to have explicit memory of events from the operating room while under general anesthesia.   8. ELECTROCONVULSIVE THERAPY PATIENTS: This consent serve below affirms that prior to the time of the procedure, I have explained to the patient and/or his/her guardian, the risks and benefits of undergoing anesthesia, as well as any reasonable alternatives.     ___________________________________________________

## (undated) NOTE — LETTER
[facility]  Administration of Anesthesia  1. I, Oziel Viera, or _________________________________ acting on her behalf, (Patient) (Dependent/Representative) request to receive anesthesia for my pending procedure/operation/treatment. bleeding, seizure, cardiac arrest and death. 7. AWARENESS: I understand that it is possible (but unlikely) to have explicit memory of events from the operating room while under general anesthesia.   8. ELECTROCONVULSIVE THERAPY PATIENTS: This consent serve below affirms that prior to the time of the procedure, I have explained to the patient and/or his/her guardian, the risks and benefits of undergoing anesthesia, as well as any reasonable alternatives.     ___________________________________________________

## (undated) NOTE — LETTER
2/8/2021              [address]         Dear Rubi Kingston records indicate that the [facility] genetic test ordered for you by Nic Salgado MD  has not been done.   Please proceed with the t

## (undated) NOTE — LETTER
4/16/2021          To Whom It May Concern:    Hattie Alexandre is currently under my medical care and may not return to work physically at this time. She is being advised to work remotely/from home for the time being.    If you require additional informat

## (undated) NOTE — LETTER
AUTHORIZATION FOR SURGICAL OPERATION OR OTHER PROCEDURE    1. I hereby authorize Dr. Berta Velez , and [facility] staff assigned to my case to perform the following operation and/or procedure at the [facility] site:    Vulvar biopsy _________________________________________________________________________      _______________________________________________________________________________________________    2.  My physician has explained the nature and purpose of the operation or other procedure, possible alternative methods of treatment, the risks involved, and the possibility of complication to me.  I acknowledge that no guarantee has been made as to the result that may be obtained.  3.  I recognize that, during the course of this operation, or other procedure, unforseen conditions may necessitate additional or different procedure than those listed above.  I, therefore, further authorize and request that the above named physician, his/her physician assistants or designees perform such procedures as are, in his/her professional opinion, necessary and desirable.  4.  Any tissue or organs removed in the operation or other procedure may be disposed of by and at the discretion of the [facility] and [facility].  5.  I understand that in the event of a medical emergency, I will be transported by local paramedics to [facility] or other hospital emergency department.  6.  I certify that I have read and fully understand the above consent to operation and/or other procedure.    7.  I acknowledge that my physician has explained sedation/analgesia administration to me including the risks and benefits.  I consent to the administration of sedation/analgesia as may be necessary or desirable in the judgement of my physician.    Witness signature: ___________________________________________________ Date:  ______/______/_____                    Time:  ________ A.M.   P.M.       Patient Name:  ______________________________________________________  (please print)      Patient signature:  ___________________________________________________             Relationship to Patient:           []  Parent    Responsible person                          []  Spouse  In case of minor or                    [] Other  _____________   Incompetent name:  __________________________________________________                               (please print)      _____________      Responsible person  In case of minor or  Incompetent signature:  _______________________________________________    Statement of Physician  My signature below affirms that prior to the time of the procedure, I have explained to the patient and/or his/her guardian, the risks and benefits involved in the proposed treatment and any reasonable alternative to the proposed treatment.  I have also explained the risks and benefits involved in the refusal of the proposed treatment and have answered the patient's questions.                        Date:  ______/______/_______  Provider                      Signature:  __________________________________________________________       Time:  ___________ A.M    P.M.

## (undated) NOTE — LETTER
Date: 3/24/2021    Patient Name: Sharif Ugalde          To Whom it may concern: This letter has been written at the patient's request. The above patient was seen at the [facility] for treatment of a medical condition.     This patient m

## (undated) NOTE — LETTER
Date & Time: 3/5/2021, 2:36 PM  Patient: Luann Samuel  Encounter Provider(s):    Karen Petit MD       To Whom It May Concern:    Luann Samuel was seen and treated in our department on 3/5/2021.  She should not return to work until March

## (undated) NOTE — LETTER
4/5/2021          To Whom It May Concern:    Radha Salinas is currently under my medical care and may not be able to return to work at this time.  Patient was recently hospitalized on 4/1/21 due to pregnancy related issues, she will benefit to be off fr

## (undated) NOTE — LETTER
Date: 3/24/2021    Patient Name: Lev Estrada          To Whom it may concern: This letter has been written at the patient's request. The above patient was seen at the [facility] for treatment of a medical condition.     This patient m

## (undated) NOTE — LETTER
AUTHORIZATION FOR SURGICAL OPERATION OR OTHER PROCEDURE    1. I hereby authorize Dr. Gilbert, and [facility] staff assigned to my case to perform the following operation and/or procedure at the [facility]:    _______________________________________________________________________________________________    Saline sonography  _______________________________________________________________________________________________    2.  My physician has explained the nature and purpose of the operation or other procedure, possible alternative methods of treatment, the risks involved, and the possibility of complication to me.  I acknowledge that no guarantee has been made as to the result that may be obtained.  3.  I recognize that, during the course of this operation, or other procedure, unforseen conditions may necessitate additional or different procedure than those listed above.  I, therefore, further authorize and request that the above named physician, his/her physician assistants or designees perform such procedures as are, in his/her professional opinion, necessary and desirable.  4.  Any tissue or organs removed in the operation or other procedure may be disposed of by and at the discretion of the [facility] and [facility].  5.  I understand that in the event of a medical emergency, I will be transported by local paramedics to [facility] or other hospital emergency department.  6.  I certify that I have read and fully understand the above consent to operation and/or other procedure.    7.  I acknowledge that my physician has explained sedation/analgesia administration to me including the risks and benefits.  I consent to the administration of sedation/analgesia as may be necessary or desirable in the judgement of my physician.    Witness signature: ___________________________________________________ Date:  ______/______/_____                    Time:  ________ A.M.   P.M.       Patient Name:  ______________________________________________________  (please print)      Patient signature:  ___________________________________________________             Relationship to Patient:           []  Parent    Responsible person                          []  Spouse  In case of minor or                    [] Other  _____________   Incompetent name:  __________________________________________________                               (please print)      _____________      Responsible person  In case of minor or  Incompetent signature:  _______________________________________________    Statement of Physician  My signature below affirms that prior to the time of the procedure, I have explained to the patient and/or his/her guardian, the risks and benefits involved in the proposed treatment and any reasonable alternative to the proposed treatment.  I have also explained the risks and benefits involved in the refusal of the proposed treatment and have answered the patient's questions.                        Date:  ______/______/_______  Provider                      Signature:  __________________________________________________________       Time:  ___________ A.M    P.M.